# Patient Record
Sex: FEMALE | Race: WHITE | NOT HISPANIC OR LATINO
[De-identification: names, ages, dates, MRNs, and addresses within clinical notes are randomized per-mention and may not be internally consistent; named-entity substitution may affect disease eponyms.]

---

## 2017-01-16 ENCOUNTER — APPOINTMENT (OUTPATIENT)
Dept: SURGICAL ONCOLOGY | Facility: CLINIC | Age: 72
End: 2017-01-16

## 2017-01-16 VITALS
SYSTOLIC BLOOD PRESSURE: 114 MMHG | HEIGHT: 61 IN | HEART RATE: 80 BPM | DIASTOLIC BLOOD PRESSURE: 70 MMHG | BODY MASS INDEX: 20.96 KG/M2 | WEIGHT: 111 LBS

## 2017-01-16 DIAGNOSIS — Z80.3 FAMILY HISTORY OF MALIGNANT NEOPLASM OF BREAST: ICD-10-CM

## 2017-01-16 DIAGNOSIS — Z97.3 PRESENCE OF SPECTACLES AND CONTACT LENSES: ICD-10-CM

## 2017-01-16 RX ORDER — MESALAMINE 1000 MG/1
1000 SUPPOSITORY RECTAL
Qty: 14 | Refills: 0 | Status: DISCONTINUED | COMMUNITY
Start: 2016-09-28

## 2017-01-18 ENCOUNTER — APPOINTMENT (OUTPATIENT)
Dept: ORTHOPEDIC SURGERY | Facility: CLINIC | Age: 72
End: 2017-01-18

## 2017-07-17 ENCOUNTER — APPOINTMENT (OUTPATIENT)
Dept: SURGICAL ONCOLOGY | Facility: CLINIC | Age: 72
End: 2017-07-17

## 2017-07-17 VITALS
HEART RATE: 69 BPM | WEIGHT: 112 LBS | DIASTOLIC BLOOD PRESSURE: 67 MMHG | BODY MASS INDEX: 21.14 KG/M2 | HEIGHT: 61 IN | OXYGEN SATURATION: 100 % | SYSTOLIC BLOOD PRESSURE: 122 MMHG

## 2017-07-17 RX ORDER — FAMOTIDINE 20 MG/1
20 TABLET, FILM COATED ORAL
Qty: 60 | Refills: 0 | Status: ACTIVE | COMMUNITY
Start: 2017-01-30

## 2017-10-06 ENCOUNTER — APPOINTMENT (OUTPATIENT)
Dept: INTERNAL MEDICINE | Facility: CLINIC | Age: 72
End: 2017-10-06
Payer: MEDICARE

## 2017-10-06 ENCOUNTER — MED ADMIN CHARGE (OUTPATIENT)
Age: 72
End: 2017-10-06

## 2017-10-06 PROCEDURE — 90662 IIV NO PRSV INCREASED AG IM: CPT

## 2017-10-06 PROCEDURE — G0008: CPT

## 2017-10-31 ENCOUNTER — APPOINTMENT (OUTPATIENT)
Dept: INTERNAL MEDICINE | Facility: CLINIC | Age: 72
End: 2017-10-31

## 2017-11-06 ENCOUNTER — APPOINTMENT (OUTPATIENT)
Dept: ORTHOPEDIC SURGERY | Facility: CLINIC | Age: 72
End: 2017-11-06
Payer: MEDICARE

## 2017-11-06 DIAGNOSIS — S46.911A STRAIN OF UNSPECIFIED MUSCLE, FASCIA AND TENDON AT SHOULDER AND UPPER ARM LEVEL, RIGHT ARM, INITIAL ENCOUNTER: ICD-10-CM

## 2017-11-06 PROCEDURE — 99213 OFFICE O/P EST LOW 20 MIN: CPT

## 2017-11-06 PROCEDURE — 73030 X-RAY EXAM OF SHOULDER: CPT | Mod: RT

## 2017-11-22 PROBLEM — S46.911A RIGHT SHOULDER STRAIN: Status: ACTIVE | Noted: 2017-11-22

## 2017-12-01 ENCOUNTER — APPOINTMENT (OUTPATIENT)
Dept: INTERNAL MEDICINE | Facility: CLINIC | Age: 72
End: 2017-12-01
Payer: MEDICARE

## 2017-12-01 LAB
ALBUMIN SERPL ELPH-MCNC: 4.1 G/DL
ALP BLD-CCNC: 58 U/L
ALT SERPL-CCNC: 19 U/L
ANION GAP SERPL CALC-SCNC: 12 MMOL/L
APPEARANCE: CLEAR
AST SERPL-CCNC: 29 U/L
BILIRUB SERPL-MCNC: 0.4 MG/DL
BILIRUBIN URINE: NEGATIVE
BLOOD URINE: NEGATIVE
BUN SERPL-MCNC: 22 MG/DL
CALCIUM SERPL-MCNC: 9.5 MG/DL
CHLORIDE SERPL-SCNC: 104 MMOL/L
CHOLEST SERPL-MCNC: 222 MG/DL
CHOLEST/HDLC SERPL: 2.9 RATIO
CO2 SERPL-SCNC: 27 MMOL/L
COLOR: YELLOW
CREAT SERPL-MCNC: 0.83 MG/DL
GLUCOSE QUALITATIVE U: NEGATIVE MG/DL
GLUCOSE SERPL-MCNC: 90 MG/DL
HDLC SERPL-MCNC: 76 MG/DL
KETONES URINE: NEGATIVE
LDLC SERPL CALC-MCNC: 130 MG/DL
LEUKOCYTE ESTERASE URINE: NEGATIVE
NITRITE URINE: NEGATIVE
PH URINE: 6.5
POTASSIUM SERPL-SCNC: 4.6 MMOL/L
PROT SERPL-MCNC: 7.1 G/DL
PROTEIN URINE: NEGATIVE MG/DL
SODIUM SERPL-SCNC: 143 MMOL/L
SPECIFIC GRAVITY URINE: 1.02
TRIGL SERPL-MCNC: 82 MG/DL
UROBILINOGEN URINE: NEGATIVE MG/DL

## 2017-12-01 PROCEDURE — 36415 COLL VENOUS BLD VENIPUNCTURE: CPT

## 2017-12-02 LAB
25(OH)D3 SERPL-MCNC: 34.7 NG/ML
BASOPHILS # BLD AUTO: 0.03 K/UL
BASOPHILS NFR BLD AUTO: 0.8 %
EOSINOPHIL # BLD AUTO: 0.03 K/UL
EOSINOPHIL NFR BLD AUTO: 0.8
HCT VFR BLD CALC: 37.8 %
HGB BLD-MCNC: 12.4 G/DL
IMM GRANULOCYTES NFR BLD AUTO: 0 %
LYMPHOCYTES # BLD AUTO: 1.2 K/UL
LYMPHOCYTES NFR BLD AUTO: 32.4 %
MAN DIFF?: NORMAL
MCHC RBC-ENTMCNC: 29.9 PG
MCHC RBC-ENTMCNC: 32.8 GM/DL
MCV RBC AUTO: 91.1 FL
MONOCYTES # BLD AUTO: 0.3 K/UL
MONOCYTES NFR BLD AUTO: 8.1 %
NEUTROPHILS # BLD AUTO: 2.14 K/UL
NEUTROPHILS NFR BLD AUTO: 57.9 %
PLATELET # BLD AUTO: 265 K/UL
RBC # BLD: 4.15 M/UL
RBC # FLD: 14 %
T4 SERPL-MCNC: 5.6 UG/DL
TSH SERPL-ACNC: 1.38 UIU/ML
WBC # FLD AUTO: 3.7 K/UL

## 2017-12-11 ENCOUNTER — APPOINTMENT (OUTPATIENT)
Dept: INTERNAL MEDICINE | Facility: CLINIC | Age: 72
End: 2017-12-11
Payer: MEDICARE

## 2017-12-11 ENCOUNTER — NON-APPOINTMENT (OUTPATIENT)
Age: 72
End: 2017-12-11

## 2017-12-11 VITALS
SYSTOLIC BLOOD PRESSURE: 120 MMHG | DIASTOLIC BLOOD PRESSURE: 70 MMHG | HEART RATE: 70 BPM | HEIGHT: 61 IN | RESPIRATION RATE: 16 BRPM | BODY MASS INDEX: 21.71 KG/M2 | WEIGHT: 115 LBS

## 2017-12-11 DIAGNOSIS — Z00.00 ENCOUNTER FOR GENERAL ADULT MEDICAL EXAMINATION W/OUT ABNORMAL FINDINGS: ICD-10-CM

## 2017-12-11 DIAGNOSIS — E55.9 VITAMIN D DEFICIENCY, UNSPECIFIED: ICD-10-CM

## 2017-12-11 PROCEDURE — 99213 OFFICE O/P EST LOW 20 MIN: CPT | Mod: 25

## 2017-12-11 PROCEDURE — G0439: CPT

## 2017-12-11 PROCEDURE — 99497 ADVNCD CARE PLAN 30 MIN: CPT | Mod: 33

## 2017-12-11 PROCEDURE — 93000 ELECTROCARDIOGRAM COMPLETE: CPT

## 2017-12-11 RX ORDER — OFLOXACIN 3 MG/ML
0.3 SOLUTION/ DROPS OPHTHALMIC
Qty: 5 | Refills: 0 | Status: DISCONTINUED | COMMUNITY
Start: 2017-01-31 | End: 2017-12-11

## 2017-12-13 ENCOUNTER — RX RENEWAL (OUTPATIENT)
Age: 72
End: 2017-12-13

## 2018-01-01 ENCOUNTER — RX RENEWAL (OUTPATIENT)
Age: 73
End: 2018-01-01

## 2018-01-12 ENCOUNTER — APPOINTMENT (OUTPATIENT)
Dept: INTERNAL MEDICINE | Facility: CLINIC | Age: 73
End: 2018-01-12
Payer: MEDICARE

## 2018-01-12 VITALS — HEART RATE: 78 BPM | RESPIRATION RATE: 16 BRPM | DIASTOLIC BLOOD PRESSURE: 78 MMHG | SYSTOLIC BLOOD PRESSURE: 134 MMHG

## 2018-01-12 PROCEDURE — 99213 OFFICE O/P EST LOW 20 MIN: CPT

## 2018-01-12 RX ORDER — TRIAMCINOLONE ACETONIDE 0.25 MG/G
0.03 CREAM TOPICAL
Qty: 80 | Refills: 0 | Status: DISCONTINUED | COMMUNITY
Start: 2017-11-02

## 2018-01-12 RX ORDER — KETOCONAZOLE 20 MG/G
2 CREAM TOPICAL
Qty: 60 | Refills: 0 | Status: DISCONTINUED | COMMUNITY
Start: 2017-10-19

## 2018-01-16 ENCOUNTER — TRANSCRIPTION ENCOUNTER (OUTPATIENT)
Age: 73
End: 2018-01-16

## 2018-01-22 ENCOUNTER — APPOINTMENT (OUTPATIENT)
Dept: SURGICAL ONCOLOGY | Facility: CLINIC | Age: 73
End: 2018-01-22
Payer: MEDICARE

## 2018-01-22 VITALS
HEART RATE: 86 BPM | BODY MASS INDEX: 21.71 KG/M2 | WEIGHT: 115 LBS | SYSTOLIC BLOOD PRESSURE: 114 MMHG | HEIGHT: 61 IN | DIASTOLIC BLOOD PRESSURE: 65 MMHG | OXYGEN SATURATION: 97 %

## 2018-01-22 PROCEDURE — 99214 OFFICE O/P EST MOD 30 MIN: CPT

## 2018-04-16 DIAGNOSIS — R73.09 OTHER ABNORMAL GLUCOSE: ICD-10-CM

## 2018-04-17 ENCOUNTER — LABORATORY RESULT (OUTPATIENT)
Age: 73
End: 2018-04-17

## 2018-04-17 ENCOUNTER — APPOINTMENT (OUTPATIENT)
Dept: INTERNAL MEDICINE | Facility: CLINIC | Age: 73
End: 2018-04-17
Payer: MEDICARE

## 2018-04-17 PROCEDURE — 36415 COLL VENOUS BLD VENIPUNCTURE: CPT

## 2018-07-23 ENCOUNTER — APPOINTMENT (OUTPATIENT)
Dept: SURGICAL ONCOLOGY | Facility: CLINIC | Age: 73
End: 2018-07-23
Payer: MEDICARE

## 2018-07-23 VITALS
HEART RATE: 89 BPM | HEIGHT: 61 IN | SYSTOLIC BLOOD PRESSURE: 99 MMHG | RESPIRATION RATE: 15 BRPM | BODY MASS INDEX: 21.71 KG/M2 | WEIGHT: 115 LBS | DIASTOLIC BLOOD PRESSURE: 64 MMHG

## 2018-07-23 DIAGNOSIS — Z85.3 PERSONAL HISTORY OF MALIGNANT NEOPLASM OF BREAST: ICD-10-CM

## 2018-07-23 PROCEDURE — 99214 OFFICE O/P EST MOD 30 MIN: CPT

## 2018-07-23 RX ORDER — MUPIROCIN 20 MG/G
2 OINTMENT TOPICAL
Qty: 22 | Refills: 0 | Status: DISCONTINUED | COMMUNITY
Start: 2017-06-21 | End: 2018-07-23

## 2019-02-04 ENCOUNTER — APPOINTMENT (OUTPATIENT)
Dept: SURGICAL ONCOLOGY | Facility: CLINIC | Age: 74
End: 2019-02-04
Payer: MEDICARE

## 2019-02-04 VITALS
SYSTOLIC BLOOD PRESSURE: 135 MMHG | RESPIRATION RATE: 16 BRPM | HEART RATE: 84 BPM | OXYGEN SATURATION: 99 % | DIASTOLIC BLOOD PRESSURE: 74 MMHG | HEIGHT: 61 IN | BODY MASS INDEX: 22.28 KG/M2 | WEIGHT: 118 LBS

## 2019-02-04 PROCEDURE — 99214 OFFICE O/P EST MOD 30 MIN: CPT

## 2019-02-04 RX ORDER — MESALAMINE 375 MG/1
0.38 CAPSULE, EXTENDED RELEASE ORAL
Refills: 0 | Status: ACTIVE | COMMUNITY

## 2019-02-04 NOTE — PHYSICAL EXAM
[Normal] : supple, no neck mass and thyroid not enlarged [Normal Supraclavicular Lymph Nodes] : normal supraclavicular lymph nodes [Normal Axillary Lymph Nodes] : normal axillary lymph nodes [Normal] : oriented to person, place and time, with appropriate affect [de-identified] : S/P right breast lumpectomy and axillary sentinel node biopsy; no masses noted. \par  [FreeTextEntry1] : deferred

## 2019-02-04 NOTE — ASSESSMENT
[FreeTextEntry1] : IMP: \par JEANETTE - hx of infiltrating ductal carcinoma 2003\par On no further adjuvant\par \par Plan:\par RTO q 6 months (as per patient request)\par Blood work and Breast MRI as per Dr. Puga\par Mammogram and sonogram 10/2019\par

## 2019-02-04 NOTE — HISTORY OF PRESENT ILLNESS
[de-identified] : Tonya is a 74 year old female who presents to the office for 6-month Breast Exam follow up visit. \par \par Past Hx: \par s/p RIGHT Breast Lumpectomy on 10/8/2003 for moderately differentiated infiltrating ductal carcinoma, followed by Radiation Therapy.  Completed course of Tamoxifen and Arimidex.\par \par Breast MRI done on 4/9/18 revealing no evidence of malignancy, (BIRADS 1).\par Mammogram/sonogram October 2018:  no evidence of malignancy (BIRADS 1).\par \par BRCA NEGATIVE\par \par Blood work done by Dr. Puga ([facility]).  10/2018 CA27.29 = 13.3\par \par At this time patient denies breast pain, palpable masses, skin changes and/or nipple discharge. \par \par Internist: Dr. Gonzalez

## 2019-05-05 ENCOUNTER — TRANSCRIPTION ENCOUNTER (OUTPATIENT)
Age: 74
End: 2019-05-05

## 2019-08-05 ENCOUNTER — APPOINTMENT (OUTPATIENT)
Dept: SURGICAL ONCOLOGY | Facility: CLINIC | Age: 74
End: 2019-08-05
Payer: MEDICARE

## 2019-08-05 VITALS
BODY MASS INDEX: 22.47 KG/M2 | DIASTOLIC BLOOD PRESSURE: 65 MMHG | RESPIRATION RATE: 15 BRPM | WEIGHT: 119 LBS | SYSTOLIC BLOOD PRESSURE: 134 MMHG | HEART RATE: 78 BPM | HEIGHT: 61 IN

## 2019-08-05 PROCEDURE — 99214 OFFICE O/P EST MOD 30 MIN: CPT

## 2019-08-05 NOTE — PHYSICAL EXAM
[Normal] : supple, no neck mass and thyroid not enlarged [Normal Supraclavicular Lymph Nodes] : normal supraclavicular lymph nodes [Normal Axillary Lymph Nodes] : normal axillary lymph nodes [Normal] : oriented to person, place and time, with appropriate affect [de-identified] : S/P right breast lumpectomy and axillary sentinel node biopsy; no masses noted. \par  [FreeTextEntry1] : deferred

## 2019-08-05 NOTE — ASSESSMENT
[FreeTextEntry1] : IMP: \par JEANETTE - hx of infiltrating ductal carcinoma 2003 s/p right lumpectomy \par On no further adjuvant\par \par Plan:\par RTO q 6 months (as per patient request)\par Blood work and Breast MRI as per Dr. Puga\par Mammogram and sonogram 10/2019 (script in computer)\par

## 2019-08-05 NOTE — HISTORY OF PRESENT ILLNESS
[de-identified] : Tonya is a 74 year old female who presents to the office for 6-month Breast Exam follow up visit. \par \par Past Hx: \par s/p RIGHT Breast Lumpectomy on 10/8/2003 for moderately differentiated infiltrating ductal carcinoma, followed by Radiation Therapy.  Completed course of Tamoxifen and Arimidex.\par \par Breast MRI done on 4/2/19 revealing no evidence of malignancy, (BIRADS 2).\par Mammogram/sonogram October 2018:  no evidence of malignancy (BIRADS 1).\par \par BRCA NEGATIVE\par \par Blood work done by Dr. Puga ([facility]).  5/2019  CA27.29 = 14.5\par \par At this time patient denies breast pain, palpable masses, skin changes and/or nipple discharge. \par \par Internist: Dr. Gonzlaez

## 2020-02-10 ENCOUNTER — APPOINTMENT (OUTPATIENT)
Dept: SURGICAL ONCOLOGY | Facility: CLINIC | Age: 75
End: 2020-02-10
Payer: MEDICARE

## 2020-02-10 ENCOUNTER — TRANSCRIPTION ENCOUNTER (OUTPATIENT)
Age: 75
End: 2020-02-10

## 2020-02-10 VITALS
WEIGHT: 119 LBS | HEIGHT: 61 IN | SYSTOLIC BLOOD PRESSURE: 128 MMHG | BODY MASS INDEX: 22.47 KG/M2 | RESPIRATION RATE: 15 BRPM | OXYGEN SATURATION: 96 % | DIASTOLIC BLOOD PRESSURE: 73 MMHG | HEART RATE: 89 BPM

## 2020-02-10 PROCEDURE — 99214 OFFICE O/P EST MOD 30 MIN: CPT

## 2020-02-10 NOTE — ASSESSMENT
[FreeTextEntry1] : IMP: \par JEANETTE - hx of infiltrating ductal carcinoma 2003 s/p right lumpectomy \par On no further adjuvant\par Mammogram/sonogram 10/2019- BIRADS 2\par \par Plan:\par RTO q 6 months (as per patient request)\par Blood work and Breast MRI as per Dr. Puga\par Mammogram and sonogram 10/2020\par

## 2020-02-10 NOTE — HISTORY OF PRESENT ILLNESS
[de-identified] : Tonya is a 75 year old female who presents to the office for 6-month Breast Exam follow up visit. \par \par Past Hx: \par s/p RIGHT Breast Lumpectomy on 10/8/2003 for moderately differentiated infiltrating ductal carcinoma, followed by Radiation Therapy.  Completed course of Tamoxifen and Arimidex.\par \par -Breast MRI done on 4/2/19 revealing no evidence of malignancy, (BIRADS 2).\par Mammogram 10/2019-  No mammographic evidence of malignancy on the right.  Questionable distortion is seen in the left upper outer quadrant.  Additional imaging with spot compression recommended BIRADS 0\par -DIagnostic left mammogram and B/L Breast ultrasound 10/17/19- No evidence of malignancy. BIRADS 2 \par \par BRCA NEGATIVE\par \par Blood work done by Dr. Puga ([facility]).  11/2019  CA27.29 = 13.6 U/mL\par \par Colonoscopy 8/2019- Diverticulosis of the descending colon.  Internal hemorrhoids. \par \par At this time patient denies breast pain, palpable masses, skin changes and/or nipple discharge. \par \par Internist: Dr. Gonzalez

## 2020-02-10 NOTE — PHYSICAL EXAM
[Normal] : supple, no neck mass and thyroid not enlarged [Normal Supraclavicular Lymph Nodes] : normal supraclavicular lymph nodes [Normal Axillary Lymph Nodes] : normal axillary lymph nodes [Normal] : oriented to person, place and time, with appropriate affect [FreeTextEntry1] : deferred [de-identified] : S/P right breast lumpectomy and axillary sentinel node biopsy; no masses noted. \par

## 2020-05-12 ENCOUNTER — RESULT REVIEW (OUTPATIENT)
Age: 75
End: 2020-05-12

## 2020-09-21 ENCOUNTER — APPOINTMENT (OUTPATIENT)
Dept: SURGICAL ONCOLOGY | Facility: CLINIC | Age: 75
End: 2020-09-21
Payer: MEDICARE

## 2020-09-21 VITALS
OXYGEN SATURATION: 97 % | SYSTOLIC BLOOD PRESSURE: 134 MMHG | BODY MASS INDEX: 23.22 KG/M2 | HEIGHT: 61 IN | RESPIRATION RATE: 15 BRPM | WEIGHT: 123 LBS | DIASTOLIC BLOOD PRESSURE: 77 MMHG | HEART RATE: 92 BPM

## 2020-09-21 PROCEDURE — 99214 OFFICE O/P EST MOD 30 MIN: CPT

## 2020-09-21 NOTE — ASSESSMENT
[FreeTextEntry1] : IMP: \par JEANETTE - hx of infiltrating ductal carcinoma 2003 s/p right lumpectomy \par On no further adjuvant\par Mammogram/sonogram 10/2019- BIRADS 2\par MRI 6/2020 -\par \par Plan:\par RTO q 6 months (as per patient request)\par Blood work as per Dr. Puga\par Mammogram and sonogram 10/2020\par MRI 6/2021\par

## 2020-09-21 NOTE — HISTORY OF PRESENT ILLNESS
[de-identified] : Tonya is a 75 year old female who presents to the office for 6-month Breast Exam follow up visit. \par \par Past Hx: \par s/p RIGHT Breast Lumpectomy on 10/8/2003 for moderately differentiated infiltrating ductal carcinoma, followed by Radiation Therapy.  Completed course of Tamoxifen and Arimidex.\par \par -Breast MRI done on 4/2/19 revealing no evidence of malignancy, (BIRADS 2).\par Mammogram 10/2019-  No mammographic evidence of malignancy on the right.  Questionable distortion is seen in the left upper outer quadrant.  Additional imaging with spot compression recommended BIRADS 0\par -DIagnostic left mammogram and B/L Breast ultrasound 10/17/19- No evidence of malignancy. BIRADS 2 \par bloodwork July, 2020 -\par \par BRCA NEGATIVE\par \par Blood work done by Dr. Puga (Med[facility]).  11/2019  CA27.29 = 13.6 U/mL\par \par Colonoscopy 8/2019- Diverticulosis of the descending colon.  Internal hemorrhoids. \par \par At this time patient denies breast pain, palpable masses, skin changes and/or nipple discharge. \par \par Internist: Dr. Gonzalez

## 2020-09-21 NOTE — PHYSICAL EXAM
[Normal] : supple, no neck mass and thyroid not enlarged [Normal Supraclavicular Lymph Nodes] : normal supraclavicular lymph nodes [Normal Axillary Lymph Nodes] : normal axillary lymph nodes [Normal] : oriented to person, place and time, with appropriate affect [de-identified] : No masses or discharge

## 2020-10-28 ENCOUNTER — APPOINTMENT (OUTPATIENT)
Dept: OTOLARYNGOLOGY | Facility: CLINIC | Age: 75
End: 2020-10-28
Payer: MEDICARE

## 2020-10-28 VITALS
HEIGHT: 60 IN | WEIGHT: 123 LBS | HEART RATE: 82 BPM | BODY MASS INDEX: 24.15 KG/M2 | TEMPERATURE: 97.3 F | SYSTOLIC BLOOD PRESSURE: 137 MMHG | DIASTOLIC BLOOD PRESSURE: 77 MMHG

## 2020-10-28 PROCEDURE — 31231 NASAL ENDOSCOPY DX: CPT

## 2020-10-28 PROCEDURE — 92567 TYMPANOMETRY: CPT

## 2020-10-28 PROCEDURE — 92557 COMPREHENSIVE HEARING TEST: CPT

## 2020-10-28 PROCEDURE — 99204 OFFICE O/P NEW MOD 45 MIN: CPT | Mod: 25

## 2020-10-28 NOTE — PHYSICAL EXAM
[Midline] : trachea located in midline position [Normal] : gait was normal [de-identified] : B/L tenderness  [Hearing Loss Right Only] : normal [Hearing Loss Left Only] : normal [Nystagmus] : ~T no ~M nystagmus was seen [Fukuda Step Test] : Fukuda Step Test was Negative

## 2020-10-28 NOTE — ASSESSMENT
[FreeTextEntry1] : Vertigo\par Peripheral vestibulopathy (BPPV) vs Cervical vertigo\par Rec eval and tx by Vestib Rehab PT\par \par bilateral sensorineural hearing loss-cleared for hearing aids\par \par DNS and Rhinitis\par -Rx: Steam humidification \par -Hypertonic saline nasal irrigations \par \par f/u 3 months

## 2020-10-28 NOTE — DATA REVIEWED
[de-identified] : bilat SNHL\par sl L>R\par Hearing Test performed to evaluate the extent of hearing loss and  to explain pt's symptoms\par

## 2020-10-28 NOTE — HISTORY OF PRESENT ILLNESS
[Vertigo] : vertigo [Dizziness] : dizziness [Nasal Congestion] : nasal congestion [No] : patient does not have a  history of radiation therapy [de-identified] : 74 yo female\par Patient complains of vertigo x 2 months. States she bend down to pick something up and got really dizzy had Meclizine at home which helped. During the day she feels well, at night when she is turning to her sided she feels dizzy lasting several seconds. Also states has hx of cervical herniated disc. Has long time hx of nasal congestion uses Afrin and Flonase PRN which helps. \par Pt has no ear pain, ear drainage, hearing loss, tinnitus, nasal discharge, epistaxis, sinus infections, facial pain, facial pressure, throat pain, dysphagia or fevers\par \par  [Anxiety] : no anxiety [Headache] : no headache [Hearing Loss] : no hearing loss [Recurrent Otitis Media] : no recurrent otitis media [Otitis Media with Effusion] : no otitis media with effusion [Presbycusis] : no presbycusis [Congenital Ear Malformation] : no congenital ear malformation [Meniere Disease] : no Meniere disease [Otosclerosis] : no otosclerosis [Perilymphatic Fistula] : no perilymphatic fistula [Hypertension] : no hypertension [Loud Noise Exposure] : no history of loud noise exposure [Smoking] : no smoking [Early Onset Hearing Loss] : no early onset hearing loss [Stroke] : no stroke [Facial Pain] : no facial pain [Facial Pressure] : no facial pressure [Diplopia] : no diplopia [Ear Fullness] : no ear fullness [Allergic Rhinitis] : no allergic rhinitis [Maxillary Tooth Infection] : no maxillary tooth infection [Septal Deviation] : no septal deviation [Environmental Allergies] : no environmental allergies [Seasonal Allergies] : no seasonal allergies [Environmental Allergens] : no environmental allergens [Adenoidectomy] : no adenoidectomy [Nasal FB Removal] : no nasal foreign body removal [Allergies] : no allergies [Asthma] : no asthma [Neck Mass] : no neck mass [Neck Pain] : no neck pain [Chills] : no chills [Cold Intolerance] : no cold intolerance [Cough] : no cough [Fatigue] : no fatigue [Heat Intolerance] : no heat intolerance [Hyperthyroidism] : no hyperthyroidism [Sialadenitis] : no sialadenitis [Hodgkin Disease] : no hodgkin disease [Non-Hodgkin Lymphoma] : no non-hodgkin lymphoma [Tobacco Use] : no tobacco use [Alcohol Use] : no alcohol use [Graves Disease] : no graves disease [Thyroid Cancer] : no thyroid cancer

## 2020-12-28 ENCOUNTER — APPOINTMENT (OUTPATIENT)
Dept: OTOLARYNGOLOGY | Facility: CLINIC | Age: 75
End: 2020-12-28
Payer: MEDICARE

## 2020-12-28 VITALS
HEART RATE: 97 BPM | BODY MASS INDEX: 23.22 KG/M2 | WEIGHT: 123 LBS | SYSTOLIC BLOOD PRESSURE: 130 MMHG | TEMPERATURE: 97.8 F | HEIGHT: 61 IN | DIASTOLIC BLOOD PRESSURE: 72 MMHG

## 2020-12-28 PROCEDURE — 99214 OFFICE O/P EST MOD 30 MIN: CPT

## 2020-12-28 RX ORDER — TIMOLOL MALEATE 5 MG/ML
0.5 SOLUTION OPHTHALMIC
Qty: 5 | Refills: 0 | Status: ACTIVE | COMMUNITY
Start: 2020-12-05

## 2020-12-28 NOTE — DATA REVIEWED
[de-identified] : bilat SNHL\par sl L>R\par Hearing Test performed to evaluate the extent of hearing loss and  to explain pt's symptoms\par

## 2020-12-28 NOTE — ASSESSMENT
[FreeTextEntry1] : Vertigo\par -Peripheral vestibulopathy (BPPV) vs Cervical vertigo\par -continue vestibular rehab\par -Advised for acupuncture, recommendations given  \par \par DNS and Rhinitis\par -Rx: Steam humidification and hypertonic saline nasal irrigations \par \par f/u 2-3 months

## 2020-12-28 NOTE — PHYSICAL EXAM
[Midline] : trachea located in midline position [Normal] : gait was normal [] : septum deviated to the left [de-identified] : B/L tenderness  [Hearing Loss Right Only] : normal [Hearing Loss Left Only] : normal [Nystagmus] : ~T no ~M nystagmus was seen [Fukuda Step Test] : Fukuda Step Test was Negative

## 2020-12-28 NOTE — HISTORY OF PRESENT ILLNESS
[No] : patient does not have a  history of radiation therapy [Vertigo] : vertigo [Dizziness] : dizziness [Nasal Congestion] : nasal congestion [de-identified] : 74 yo female\par Patient complains of vertigo x 2 months. States she bend down to pick something up and got really dizzy had Meclizine at home which helped. During the day she feels well, at night when she is turning to her sided she feels dizzy lasting several seconds. Also states has hx of cervical herniated disc. Has long time hx of nasal congestion uses Afrin and Flonase PRN which helps. \par Pt has no ear pain, ear drainage, hearing loss, tinnitus, nasal discharge, epistaxis, sinus infections, facial pain, facial pressure, throat pain, dysphagia or fevers\par \par  [FreeTextEntry1] : 12/28/2020\par Patient presents for follow up. States she is feeling slightly better. Going for vestibular rehab with moderate relief. Using hypertonic saline for nasal congestion with mild relief.\par no other modifying factors\par \par  [Anxiety] : no anxiety [Headache] : no headache [Hearing Loss] : no hearing loss [Recurrent Otitis Media] : no recurrent otitis media [Otitis Media with Effusion] : no otitis media with effusion [Presbycusis] : no presbycusis [Congenital Ear Malformation] : no congenital ear malformation [Meniere Disease] : no Meniere disease [Otosclerosis] : no otosclerosis [Perilymphatic Fistula] : no perilymphatic fistula [Hypertension] : no hypertension [Loud Noise Exposure] : no history of loud noise exposure [Smoking] : no smoking [Early Onset Hearing Loss] : no early onset hearing loss [Stroke] : no stroke [Facial Pain] : no facial pain [Facial Pressure] : no facial pressure [Diplopia] : no diplopia [Ear Fullness] : no ear fullness [Allergic Rhinitis] : no allergic rhinitis [Maxillary Tooth Infection] : no maxillary tooth infection [Septal Deviation] : no septal deviation [Environmental Allergies] : no environmental allergies [Seasonal Allergies] : no seasonal allergies [Environmental Allergens] : no environmental allergens [Adenoidectomy] : no adenoidectomy [Nasal FB Removal] : no nasal foreign body removal [Allergies] : no allergies [Asthma] : no asthma [Neck Mass] : no neck mass [Neck Pain] : no neck pain [Chills] : no chills [Cold Intolerance] : no cold intolerance [Cough] : no cough [Fatigue] : no fatigue [Heat Intolerance] : no heat intolerance [Hyperthyroidism] : no hyperthyroidism [Sialadenitis] : no sialadenitis [Hodgkin Disease] : no hodgkin disease [Non-Hodgkin Lymphoma] : no non-hodgkin lymphoma [Tobacco Use] : no tobacco use [Alcohol Use] : no alcohol use [Graves Disease] : no graves disease [Thyroid Cancer] : no thyroid cancer

## 2021-01-01 NOTE — REVIEW OF SYSTEMS
[Patient Intake Form Reviewed] : Patient intake form was reviewed [As Noted in HPI] : as noted in HPI [Negative] : Heme/Lymph 38w2d

## 2021-02-10 ENCOUNTER — NON-APPOINTMENT (OUTPATIENT)
Age: 76
End: 2021-02-10

## 2021-03-01 ENCOUNTER — APPOINTMENT (OUTPATIENT)
Dept: OTOLARYNGOLOGY | Facility: CLINIC | Age: 76
End: 2021-03-01
Payer: MEDICARE

## 2021-03-01 VITALS
TEMPERATURE: 97.8 F | SYSTOLIC BLOOD PRESSURE: 129 MMHG | DIASTOLIC BLOOD PRESSURE: 71 MMHG | HEART RATE: 81 BPM | BODY MASS INDEX: 23.22 KG/M2 | HEIGHT: 61 IN | WEIGHT: 123 LBS

## 2021-03-01 DIAGNOSIS — H81.10 BENIGN PAROXYSMAL VERTIGO, UNSPECIFIED EAR: ICD-10-CM

## 2021-03-01 PROCEDURE — 99214 OFFICE O/P EST MOD 30 MIN: CPT | Mod: 25

## 2021-03-01 PROCEDURE — 31231 NASAL ENDOSCOPY DX: CPT

## 2021-03-01 NOTE — PHYSICAL EXAM
[Nasal Endoscopy Performed] : nasal endoscopy was performed, see procedure section for findings [] : septum deviated to the left [Midline] : trachea located in midline position [Normal] : gait was normal [de-identified] : B/L tenderness  [Hearing Loss Right Only] : normal [Hearing Loss Left Only] : normal [Nystagmus] : ~T no ~M nystagmus was seen [Fukuda Step Test] : Fukuda Step Test was Negative

## 2021-03-01 NOTE — DATA REVIEWED
[de-identified] : bilat SNHL\par sl L>R\par Hearing Test performed to evaluate the extent of hearing loss and  to explain pt's symptoms\par

## 2021-03-01 NOTE — HISTORY OF PRESENT ILLNESS
[No] : patient does not have a  history of radiation therapy [Vertigo] : vertigo [Dizziness] : dizziness [Nasal Congestion] : nasal congestion [de-identified] : 75 yo female with hx of vertigo presents for follow up. States she is going to vestibular rehab and has lots of improvement. Has 9 more sessions left of vestibular rehab. States last week she was feeling slightly dizzy when getting out of bed also noticed that she has been congested over the past week using hypertonic saline. Has hx of cervical herniated disc. Pt has no ear pain, ear drainage, hearing loss, tinnitus, nasal discharge, epistaxis, sinus infections, facial pain, facial pressure, throat pain, dysphagia or fevers\par \par  [FreeTextEntry1] : \par \par  [Anxiety] : no anxiety [Headache] : no headache [Hearing Loss] : no hearing loss [Recurrent Otitis Media] : no recurrent otitis media [Otitis Media with Effusion] : no otitis media with effusion [Presbycusis] : no presbycusis [Congenital Ear Malformation] : no congenital ear malformation [Meniere Disease] : no Meniere disease [Otosclerosis] : no otosclerosis [Perilymphatic Fistula] : no perilymphatic fistula [Hypertension] : no hypertension [Loud Noise Exposure] : no history of loud noise exposure [Smoking] : no smoking [Early Onset Hearing Loss] : no early onset hearing loss [Stroke] : no stroke [Facial Pain] : no facial pain [Facial Pressure] : no facial pressure [Diplopia] : no diplopia [Ear Fullness] : no ear fullness [Allergic Rhinitis] : no allergic rhinitis [Maxillary Tooth Infection] : no maxillary tooth infection [Septal Deviation] : no septal deviation [Environmental Allergies] : no environmental allergies [Seasonal Allergies] : no seasonal allergies [Environmental Allergens] : no environmental allergens [Adenoidectomy] : no adenoidectomy [Nasal FB Removal] : no nasal foreign body removal [Allergies] : no allergies [Asthma] : no asthma [Neck Mass] : no neck mass [Neck Pain] : no neck pain [Chills] : no chills [Cold Intolerance] : no cold intolerance [Cough] : no cough [Fatigue] : no fatigue [Heat Intolerance] : no heat intolerance [Hyperthyroidism] : no hyperthyroidism [Sialadenitis] : no sialadenitis [Hodgkin Disease] : no hodgkin disease [Non-Hodgkin Lymphoma] : no non-hodgkin lymphoma [Tobacco Use] : no tobacco use [Alcohol Use] : no alcohol use [Graves Disease] : no graves disease [Thyroid Cancer] : no thyroid cancer

## 2021-03-01 NOTE — ASSESSMENT
[FreeTextEntry1] : Patient with hx of vertigo currently going to vestibular rehab complains of nasal congestion \par Vertigo\par -Peripheral vestibulopathy (BPPV) vs Cervical vertigo\par -continue vestibular rehab, has major improvement with therapy \par \par \par DNS and Rhinitis\par -Rx: Steam humidification\par -Rx: Flonase \par \par f/u 2 months or prn

## 2021-03-22 ENCOUNTER — APPOINTMENT (OUTPATIENT)
Dept: SURGICAL ONCOLOGY | Facility: CLINIC | Age: 76
End: 2021-03-22
Payer: MEDICARE

## 2021-03-22 VITALS
BODY MASS INDEX: 23.22 KG/M2 | HEIGHT: 61 IN | SYSTOLIC BLOOD PRESSURE: 133 MMHG | OXYGEN SATURATION: 97 % | HEART RATE: 76 BPM | DIASTOLIC BLOOD PRESSURE: 77 MMHG | WEIGHT: 123 LBS

## 2021-03-22 PROCEDURE — 99214 OFFICE O/P EST MOD 30 MIN: CPT

## 2021-03-22 NOTE — ASSESSMENT
[FreeTextEntry1] : IMP: \par JEANETTE - hx of infiltrating ductal carcinoma 2003 s/p right lumpectomy \par On no further adjuvant\par Mammogram/sonogram 10/2020- BIRADS 1\par MRI 6/2020 -BIRADS 1\par \par Plan:\par RTO q 6 months (as per patient request)\par Blood work as per Dr. Benavides \par Mammogram and sonogram 10/2021 \par MRI Breast due now (ordered)\par

## 2021-03-22 NOTE — HISTORY OF PRESENT ILLNESS
[de-identified] : Tonya is a 76 year old female who presents to the office for 6-month Breast Exam follow up visit. \par \par Past Hx: \par s/p RIGHT Breast Lumpectomy on 10/8/2003 for moderately differentiated infiltrating ductal carcinoma, followed by Radiation Therapy.  Completed course of Tamoxifen and Arimidex.\par \par BRCA NEGATIVE\par \par Mammogram/sonogram 10/2020- BIRADS 1 \par Bilateral Breast MRI 6/2020- BIRADS 1\par Blood work done by Dr. Benavides (Med-Onc).  CA27.29 done in 11/2020 (13.8 U/mL)\par Colonoscopy 8/2019- Diverticulosis of the descending colon.  Internal hemorrhoids. \par \par At this time patient denies breast pain, palpable masses, skin changes and/or nipple discharge. \par \par Internist: Dr. Gonzalez

## 2021-03-22 NOTE — PHYSICAL EXAM
[Normal] : supple, no neck mass and thyroid not enlarged [Normal Supraclavicular Lymph Nodes] : normal supraclavicular lymph nodes [Normal Axillary Lymph Nodes] : normal axillary lymph nodes [Normal] : oriented to person, place and time, with appropriate affect [de-identified] : healed right breast incision; no masses or nipple discharge \par  [FreeTextEntry1] : deferred

## 2021-03-24 LAB — CANCER AG27-29 SERPL-ACNC: 12.1 U/ML

## 2021-04-23 ENCOUNTER — APPOINTMENT (OUTPATIENT)
Dept: PULMONOLOGY | Facility: CLINIC | Age: 76
End: 2021-04-23
Payer: MEDICARE

## 2021-04-23 VITALS
WEIGHT: 124 LBS | DIASTOLIC BLOOD PRESSURE: 91 MMHG | TEMPERATURE: 98 F | OXYGEN SATURATION: 99 % | HEIGHT: 61.5 IN | HEART RATE: 91 BPM | SYSTOLIC BLOOD PRESSURE: 130 MMHG | BODY MASS INDEX: 23.11 KG/M2

## 2021-04-23 PROCEDURE — 99203 OFFICE O/P NEW LOW 30 MIN: CPT

## 2021-04-23 NOTE — HISTORY OF PRESENT ILLNESS
[TextBox_4] : 76-year-old female with an abnormal image of her right lung. The patient is essentially asymptomatic and was undergoing a routine evaluation of her breast. In 2003 she developed a breast cancer which required lumpectomy and radiation and she has been subsequently followed with breast imaging. A recent MRI did a lung lesion which was confirmed on a PET/CT. She has a 0.9 cm nodule in her right middle lobe that is mildly hypermetabolic. She has several cysts of her liver which have been known for a number of years and confirmed with a recent MRI also.\par . The patient is otherwise in good health. Last year she was hospitalized at [facility] for chest pain and underwent a negative coronary angiogram. The patient has had no previous CT imaging of her lung\par Her oncologist is Jordin Lebron MD and her internist is Tawny Cedillo MD

## 2021-04-23 NOTE — PHYSICAL EXAM
[No Acute Distress] : no acute distress [Normal Appearance] : normal appearance [No Neck Mass] : no neck mass [Normal Rate/Rhythm] : normal rate/rhythm [Normal S1, S2] : normal s1, s2 [No Resp Distress] : no resp distress [Clear to Auscultation Bilaterally] : clear to auscultation bilaterally [Normal Gait] : normal gait [No Clubbing] : no clubbing [No Edema] : no edema [No Focal Deficits] : no focal deficits

## 2021-04-23 NOTE — ASSESSMENT
[FreeTextEntry1] : 76-year-old female with a suspicious pulmonary nodule in an area that was previously subjected to radiation. She has a minimal smoking history and no prior history of a lung disease. I discussed approaches to this lesion but because of its size and location, I thought biopsy was not an option at this point. I have referred the patient to Dr. Vernon Wilkins M.D. for possible surgery.

## 2021-04-28 ENCOUNTER — APPOINTMENT (OUTPATIENT)
Dept: THORACIC SURGERY | Facility: CLINIC | Age: 76
End: 2021-04-28
Payer: MEDICARE

## 2021-04-28 ENCOUNTER — NON-APPOINTMENT (OUTPATIENT)
Age: 76
End: 2021-04-28

## 2021-04-28 VITALS
WEIGHT: 124 LBS | DIASTOLIC BLOOD PRESSURE: 81 MMHG | BODY MASS INDEX: 23.41 KG/M2 | TEMPERATURE: 98.4 F | OXYGEN SATURATION: 99 % | HEART RATE: 93 BPM | RESPIRATION RATE: 18 BRPM | SYSTOLIC BLOOD PRESSURE: 134 MMHG | HEIGHT: 61 IN

## 2021-04-28 DIAGNOSIS — R91.1 SOLITARY PULMONARY NODULE: ICD-10-CM

## 2021-04-28 PROCEDURE — 99205 OFFICE O/P NEW HI 60 MIN: CPT

## 2021-04-28 RX ORDER — KETOCONAZOLE 20.5 MG/ML
2 SHAMPOO, SUSPENSION TOPICAL
Qty: 120 | Refills: 0 | Status: DISCONTINUED | COMMUNITY
Start: 2020-12-08 | End: 2021-04-28

## 2021-04-28 RX ORDER — CLOBETASOL PROPIONATE 0.5 MG/G
0.05 OINTMENT TOPICAL
Qty: 30 | Refills: 0 | Status: DISCONTINUED | COMMUNITY
Start: 2020-07-01 | End: 2021-04-28

## 2021-04-28 RX ORDER — SULFAMETHOXAZOLE AND TRIMETHOPRIM 800; 160 MG/1; MG/1
800-160 TABLET ORAL
Qty: 10 | Refills: 0 | Status: DISCONTINUED | COMMUNITY
Start: 2020-12-09 | End: 2021-04-28

## 2021-04-28 RX ORDER — HYDROCORTISONE 25 MG/G
2.5 CREAM TOPICAL
Qty: 30 | Refills: 0 | Status: DISCONTINUED | COMMUNITY
Start: 2020-09-11 | End: 2021-04-28

## 2021-04-28 RX ORDER — OXYBUTYNIN CHLORIDE 5 MG/1
5 TABLET ORAL
Refills: 0 | Status: DISCONTINUED | COMMUNITY
End: 2021-04-28

## 2021-04-28 RX ORDER — CYCLOBENZAPRINE HYDROCHLORIDE 5 MG/1
5 TABLET, FILM COATED ORAL
Qty: 30 | Refills: 0 | Status: DISCONTINUED | COMMUNITY
Start: 2020-11-10 | End: 2021-04-28

## 2021-04-28 RX ORDER — MECLIZINE HYDROCHLORIDE 12.5 MG/1
12.5 TABLET ORAL EVERY 8 HOURS
Qty: 21 | Refills: 0 | Status: DISCONTINUED | COMMUNITY
Start: 2017-08-23 | End: 2021-04-28

## 2021-04-28 RX ORDER — MOMETASONE FUROATE 1 MG/G
0.1 CREAM TOPICAL
Qty: 45 | Refills: 0 | Status: DISCONTINUED | COMMUNITY
Start: 2020-12-08 | End: 2021-04-28

## 2021-04-28 RX ORDER — CLINDAMYCIN PHOSPHATE 1 G/10ML
1 GEL TOPICAL
Qty: 60 | Refills: 0 | Status: DISCONTINUED | COMMUNITY
Start: 2020-12-08 | End: 2021-04-28

## 2021-04-29 ENCOUNTER — NON-APPOINTMENT (OUTPATIENT)
Age: 76
End: 2021-04-29

## 2021-04-29 PROBLEM — R91.1 PULMONARY NODULE, RIGHT: Status: RESOLVED | Noted: 2021-04-23 | Resolved: 2021-04-29

## 2021-04-29 RX ORDER — DENOSUMAB 60 MG/ML
60 INJECTION SUBCUTANEOUS
Refills: 0 | Status: ACTIVE | COMMUNITY

## 2021-04-29 RX ORDER — BISMUTH SUBSALICYLATE 525 MG/1
TABLET ORAL
Refills: 0 | Status: ACTIVE | COMMUNITY

## 2021-04-29 RX ORDER — CHOLECALCIFEROL (VITAMIN D3) 250 MCG
250 MCG CAPSULE ORAL
Refills: 0 | Status: COMPLETED | COMMUNITY
End: 2021-04-29

## 2021-04-29 NOTE — ASSESSMENT
[FreeTextEntry1] : Ms. MARIFER BENTLEY, 76 year old female, former smoker (2 PPD x 3 years; Quit 1970), w/ hx of breast cancer s/p lumpectomy and radiation 2003, who was undergoing routine evaluation of her breast that revealed 0.9 cm RML LUNG LESION on MRI Breast 4/13/2021, with mildly avidity confirmed on PET/CT 4/16/21. The patient is otherwise in good health. Last year she was hospitalized at [facility] for chest pain and underwent a negative coronary angiogram.  \par \par I have independently reviewed the medical records and imaging at the time of this office consultation. Report of 2011 CT chest noting right middle lobe decreasing tubular  lung nodules. Discussed with patient and  that reviewing 2011 imaging will be helpful in deciphering nodule stability. Office has requested imaging from [facility]. Patient recommended to return to clinic in 1 week to discuss further plan of care. \par \par Recommendations reviewed with patient during this office visit, and all questions answered; Patient instructed on the importance of follow up and verbalizes understanding.\par \par I personally performed the services described in the documentation, reviewed the documentation recorded by the scribe in my presence and it accurately and completely records my words and actions.\par \par I, PEE Aparicio-C, am scribing for and the presence of Dr. Vernon Wilkins, the following sections HISTORY OF PRESENT ILLNESS, PAST MEDICAL/FAMILY/SOCIAL HISTORY; REVIEW OF SYSTEMS; VITAL SIGNS; PHYSICAL EXAM; DISPOSITION.\par \par

## 2021-04-29 NOTE — HISTORY OF PRESENT ILLNESS
[FreeTextEntry1] : Ms. MARIFER BENTLEY, 76 year old female, former smoker (2 PPD x 3 years; Quit 1970), w/ hx of breast cancer s/p lumpectomy and radiation 2003, who was undergoing routine evaluation of her breast that revealed 0.9 cm RML LUNG LESION on MRI Breast 4/13/2021, with mildly avidity confirmed on PET/CT 4/16/21. The patient is otherwise in good health. Last year she was hospitalized at [facility] for chest pain and underwent a negative coronary angiogram.  \par \par CT chest from 6/30/2011\par - Previously noted tubular opacity in RML on 10/31/2006 has decreased in length\par - Adjacent small tubular opacities are noted in the adjacent lung field, unchanged\par - Calcified granuloma again noted in RLL\par - Subcentimeter hypodensity in the RLL appears unchanged\par \par MRI Breast 4/13/2021\par - 0.9 cm enhancing mass in the RML (series 6, image 34)\par - 1.5 cm enhancing mass in the right lobe of liver\par \par PET/CT on 4/16/2021:\par - 8 x 8 mm round nodule in RML SUV 0.9 (4:43)\par \par Of note, she has several cysts of her liver which have been known for a number of years and confirmed with a recent MRI also.\par \par Patient is here today for CT Sx consultation, referred by Dr. Jean Elizabeth (PULM). Patient denies worsening SOB, chest pain, cough, hemoptysis, fever, chills, night sweats, lightheadedness or dizziness.\par

## 2021-04-29 NOTE — CONSULT LETTER
[Dear  ___] : Dear  [unfilled], [Please see my note below.] : Please see my note below. [Consult Closing:] : Thank you very much for allowing me to participate in the care of this patient.  If you have any questions, please do not hesitate to contact me. [Sincerely,] : Sincerely, [Consult Letter:] : I had the pleasure of evaluating your patient, [unfilled]. [FreeTextEntry2] : Dr. Jean Elizabeth (PULM)\par Dr. Jordin Lebron MD (ONC)\par Dr. Tawny Cedillo (PCP) [FreeTextEntry3] : Vernon Wilkins MD, FACS \par , Division of Thoracic Surgery \par [facility] \par Chief, Thoracic Surgery \par [facility] \par Department of Cardiovascular & Thoracic Surgery \par  \par [facility]\par

## 2021-04-29 NOTE — PHYSICAL EXAM
[General Appearance - Alert] : alert [General Appearance - In No Acute Distress] : in no acute distress [General Appearance - Well Nourished] : well nourished [General Appearance - Well Developed] : well developed [General Appearance - Well-Appearing] : healthy appearing [Sclera] : the sclera and conjunctiva were normal [PERRL With Normal Accommodation] : pupils were equal in size, round, and reactive to light [Extraocular Movements] : extraocular movements were intact [Outer Ear] : the ears and nose were normal in appearance [Hearing Threshold Finger Rub Not [place]] : hearing was normal [Examination Of The Oral Cavity] : the lips and gums were normal [Neck Appearance] : the appearance of the neck was normal [Neck Cervical Mass (___cm)] : no neck mass was observed [Respiration, Rhythm And Depth] : normal respiratory rhythm and effort [Exaggerated Use Of Accessory Muscles For Inspiration] : no accessory muscle use [Auscultation Breath Sounds / Voice Sounds] : lungs were clear to auscultation bilaterally [Heart Rate And Rhythm] : heart rate was normal and rhythm regular [Examination Of The Chest] : the chest was normal in appearance [Chest Visual Inspection Thoracic Asymmetry] : no chest asymmetry [Diminished Respiratory Excursion] : normal chest expansion [2+] : left 2+ [Breast Appearance] : normal in appearance [Breast Palpation Mass] : no palpable masses [Bowel Sounds] : normal bowel sounds [Abdomen Soft] : soft [Abdomen Tenderness] : non-tender [Cervical Lymph Nodes Enlarged Posterior Bilaterally] : posterior cervical [Cervical Lymph Nodes Enlarged Anterior Bilaterally] : anterior cervical [Supraclavicular Lymph Nodes Enlarged Bilaterally] : supraclavicular [No CVA Tenderness] : no ~M costovertebral angle tenderness [No Spinal Tenderness] : no spinal tenderness [Abnormal Walk] : normal gait [Nail Clubbing] : no clubbing  or cyanosis of the fingernails [Musculoskeletal - Swelling] : no joint swelling seen [Skin Color & Pigmentation] : normal skin color and pigmentation [Skin Turgor] : normal skin turgor [] : no rash [Skin Lesions] : no skin lesions [Deep Tendon Reflexes (DTR)] : deep tendon reflexes were 2+ and symmetric [Sensation] : the sensory exam was normal to light touch and pinprick [Motor Exam] : the motor exam was normal [No Focal Deficits] : no focal deficits [Oriented To Time, Place, And Person] : oriented to person, place, and time [Impaired Insight] : insight and judgment were intact [Affect] : the affect was normal [Mood] : the mood was normal [Memory Recent] : recent memory was not impaired [Memory Remote] : remote memory was not impaired [FreeTextEntry1] : deferred

## 2021-05-03 ENCOUNTER — APPOINTMENT (OUTPATIENT)
Dept: OTOLARYNGOLOGY | Facility: CLINIC | Age: 76
End: 2021-05-03
Payer: MEDICARE

## 2021-05-03 VITALS
SYSTOLIC BLOOD PRESSURE: 142 MMHG | HEIGHT: 61 IN | HEART RATE: 98 BPM | TEMPERATURE: 97.6 F | DIASTOLIC BLOOD PRESSURE: 71 MMHG | BODY MASS INDEX: 23.41 KG/M2 | WEIGHT: 124 LBS

## 2021-05-03 DIAGNOSIS — J32.9 CHRONIC SINUSITIS, UNSPECIFIED: ICD-10-CM

## 2021-05-03 PROBLEM — R91.1 PULMONARY NODULE, RIGHT: Status: ACTIVE | Noted: 2021-04-29

## 2021-05-03 PROCEDURE — 99213 OFFICE O/P EST LOW 20 MIN: CPT | Mod: 25

## 2021-05-03 PROCEDURE — 31231 NASAL ENDOSCOPY DX: CPT

## 2021-05-03 NOTE — PROCEDURE
[FreeTextEntry6] : Anterior Rhinoscopy insufficient to account for symptoms.\par \par After informed verbal consent is obtained, the fiberoptic nasal endoscope #31 is passed via the right nasal cavity.\par The following anatomic sites were directly examined in a sequential fashion:\par The scope was introduced in both  nasal passages between the middle and inferior turbinates to exam the inferior portion of the middle meatus and the fontanelle, as well as the maxillary ostia.  Next, the scope was passed medially and posteriorly to the middle turbinates to examine the sphenoethmoid recess and the superior turbinate region.\par  \par \par   There is [ 0 ]% obstruction of the nasopharynx with adenoid tissue.\par \par A deviated nasal septum was noted causing obstruction.\par The turbinates were congested-bilateral.\par \par Right Side:\par ·               Mucosa: wnl\par ·               Mucous: none\par ·               Polyp: none\par ·               Inferior Turbinate: sl congested\par ·               Middle Turbinate:sl congested\par ·               Superior Turbinate: wnl\par ·               Inferior Meatus:clear\par ·               Middle Meatus: clear\par ·               Super Meatus: clear\par ·               Sphenoethmoidal Recess: wnl\par \par \par \par Left Side:\par ·               Mucosa: wnl\par ·               Mucous:none\par ·               Polyp: none\par ·               Inferior Turbinate: sl congested\par ·               Middle Turbinate: sl congested\par ·               Superior Turbinate:wnl\par ·               Inferior Meatus: clear\par ·               Middle Meatus: clear\par ·               Super Meatus:clear\par ·               Sphenoethmoidal Recess: wnl\par \par

## 2021-05-03 NOTE — HISTORY OF PRESENT ILLNESS
[No] : patient does not have a  history of radiation therapy [Vertigo] : vertigo [Dizziness] : dizziness [Nasal Congestion] : nasal congestion [de-identified] : 75 yo female with hx of vertigo presents for follow up. States she is going to vestibular rehab and has lots of improvement. Has 9 more sessions left of vestibular rehab. States last week she was feeling slightly dizzy when getting out of bed also noticed that she has been congested over the past week using hypertonic saline. Has hx of cervical herniated disc. Pt has no ear pain, ear drainage, hearing loss, tinnitus, nasal discharge, epistaxis, sinus infections, facial pain, facial pressure, throat pain, dysphagia or fevers\par \par  [FreeTextEntry1] : Patient presents for follow up. States dizziness is better, over the last few weeks she has been doing well. Finished vestibular rehab.  Has mild nasal congestion, using Flonase and AYR sprays with mild relief. \par No other modifying factors\par \par \par  [Anxiety] : no anxiety [Headache] : no headache [Hearing Loss] : no hearing loss [Recurrent Otitis Media] : no recurrent otitis media [Otitis Media with Effusion] : no otitis media with effusion [Presbycusis] : no presbycusis [Congenital Ear Malformation] : no congenital ear malformation [Meniere Disease] : no Meniere disease [Otosclerosis] : no otosclerosis [Perilymphatic Fistula] : no perilymphatic fistula [Hypertension] : no hypertension [Loud Noise Exposure] : no history of loud noise exposure [Smoking] : no smoking [Early Onset Hearing Loss] : no early onset hearing loss [Stroke] : no stroke [Facial Pain] : no facial pain [Facial Pressure] : no facial pressure [Diplopia] : no diplopia [Ear Fullness] : no ear fullness [Allergic Rhinitis] : no allergic rhinitis [Maxillary Tooth Infection] : no maxillary tooth infection [Septal Deviation] : no septal deviation [Environmental Allergies] : no environmental allergies [Seasonal Allergies] : no seasonal allergies [Environmental Allergens] : no environmental allergens [Adenoidectomy] : no adenoidectomy [Nasal FB Removal] : no nasal foreign body removal [Allergies] : no allergies [Asthma] : no asthma [Neck Mass] : no neck mass [Neck Pain] : no neck pain [Chills] : no chills [Cold Intolerance] : no cold intolerance [Cough] : no cough [Fatigue] : no fatigue [Heat Intolerance] : no heat intolerance [Hyperthyroidism] : no hyperthyroidism [Sialadenitis] : no sialadenitis [Hodgkin Disease] : no hodgkin disease [Non-Hodgkin Lymphoma] : no non-hodgkin lymphoma [Tobacco Use] : no tobacco use [Alcohol Use] : no alcohol use [Graves Disease] : no graves disease [Thyroid Cancer] : no thyroid cancer

## 2021-05-03 NOTE — PHYSICAL EXAM
[Nasal Endoscopy Performed] : nasal endoscopy was performed, see procedure section for findings [] : septum deviated to the left [Midline] : trachea located in midline position [Normal] : gait was normal [de-identified] : B/L tenderness  [Hearing Loss Right Only] : normal [Hearing Loss Left Only] : normal [Nystagmus] : ~T no ~M nystagmus was seen [Fukuda Step Test] : Fukuda Step Test was Negative

## 2021-05-03 NOTE — DATA REVIEWED
[de-identified] : bilat SNHL\par sl L>R\par Hearing Test performed to evaluate the extent of hearing loss and  to explain pt's symptoms\par

## 2021-05-03 NOTE — ASSESSMENT
[FreeTextEntry1] : Patient with hx of vertigo presents for follow up, finished vestibular rehab \par Vertigo\par -now resolved \par \par DNS and Rhinitis\par -Rx: Steam humidification and hypertonic saline \par \par f/u prn

## 2021-05-05 ENCOUNTER — APPOINTMENT (OUTPATIENT)
Dept: THORACIC SURGERY | Facility: CLINIC | Age: 76
End: 2021-05-05
Payer: MEDICARE

## 2021-05-05 VITALS
WEIGHT: 124 LBS | BODY MASS INDEX: 23.41 KG/M2 | SYSTOLIC BLOOD PRESSURE: 151 MMHG | TEMPERATURE: 97.9 F | HEART RATE: 80 BPM | OXYGEN SATURATION: 96 % | HEIGHT: 61 IN | DIASTOLIC BLOOD PRESSURE: 77 MMHG

## 2021-05-05 DIAGNOSIS — R91.1 SOLITARY PULMONARY NODULE: ICD-10-CM

## 2021-05-05 PROCEDURE — 99214 OFFICE O/P EST MOD 30 MIN: CPT

## 2021-05-05 NOTE — PHYSICAL EXAM
[Auscultation Breath Sounds / Voice Sounds] : lungs were clear to auscultation bilaterally [Heart Rate And Rhythm] : heart rate was normal and rhythm regular [Heart Sounds] : normal S1 and S2 [Heart Sounds Gallop] : no gallops [Murmurs] : no murmurs [Heart Sounds Pericardial Friction Rub] : no pericardial rub [Examination Of The Chest] : the chest was normal in appearance [Chest Visual Inspection Thoracic Asymmetry] : no chest asymmetry [Diminished Respiratory Excursion] : normal chest expansion [Bowel Sounds] : normal bowel sounds [Abdomen Soft] : soft [Abdomen Tenderness] : non-tender [Abdomen Mass (___ Cm)] : no abdominal mass palpated [Skin Color & Pigmentation] : normal skin color and pigmentation [Skin Turgor] : normal skin turgor [] : no rash

## 2021-05-06 NOTE — HISTORY OF PRESENT ILLNESS
[FreeTextEntry1] : Ms. MARIFER BENTLEY, 76 year old female, former smoker (2 PPD x 3 years; Quit 1970), w/ hx of breast cancer s/p lumpectomy and radiation 2003, who was undergoing routine evaluation of her breast that revealed 0.9 cm RML LUNG LESION on MRI Breast 4/13/2021, with mildly avidity confirmed on PET/CT 4/16/21. The patient is otherwise in good health. Last year she was hospitalized at [facility] for chest pain and underwent a negative coronary angiogram. \par \par CT chest from 6/30/2011\par - Previously noted tubular opacity in RML on 10/31/2006 has decreased in length\par - Adjacent small tubular opacities are noted in the adjacent lung field, unchanged\par - Calcified granuloma again noted in RLL\par - Subcentimeter hypodensity in the RLL appears unchanged\par \par MRI Breast 4/13/2021\par - 0.9 cm enhancing mass in the RML (series 6, image 34)\par - 1.5 cm enhancing mass in the right lobe of liver\par \par PET/CT on 4/16/2021:\par - 8 x 8 mm round nodule in RML SUV 0.9 (4:43)\par \par Of note, she has several cysts of her liver which have been known for a number of years and confirmed with a recent MRI also.\par \par Of note, she was last seen for initial visit 1 week ago on 4/28/2021; Report of 2011 CT chest noting right middle lobe decreasing tubular lung nodules. Discussed with patient and  that reviewing 2011 imaging will be helpful in deciphering nodule stability. Office has requested imaging from [facility]. \par \par Patient is here today for a follow up.

## 2021-05-06 NOTE — DATA REVIEWED
[FreeTextEntry1] : I independently reviewed the 2011 imaging:\par CT chest from 6/30/2011\par - Previously noted tubular opacity in RML on 10/31/2006 has decreased in length\par - Adjacent small tubular opacities are noted in the adjacent lung field, unchanged\par - Calcified granuloma again noted in RLL\par - Subcentimeter hypodensity in the RLL appears unchanged

## 2021-05-06 NOTE — ASSESSMENT
[FreeTextEntry1] : Ms. MARIFER BENTLEY, 76 year old female, former smoker (2 PPD x 3 years; Quit 1970), w/ hx of breast cancer s/p lumpectomy and radiation 2003, who was undergoing routine evaluation of her breast that revealed 0.9 cm RML LUNG LESION on MRI Breast 4/13/2021, with mildly avidity confirmed on PET/CT 4/16/21. The patient is otherwise in good health. Last year she was hospitalized at [facility] for chest pain and underwent a negative coronary angiogram. \par \par I reviewed that CT chest from 2011 and compared it with PET/CT from 4/15/21. There was really no nodule seen from 2011. So I would like to repeat the scan in about 1 month. If the nodule get smaller, then I would just do surveillance scans to monitor it. If the nodule stays the same or grows larger, I would recommend to surgically remove it. She understands and agrees to this plan. \par \par \par I personally performed the services described in the documentation, reviewed the documentation recorded by the scribe in my presence and it accurately and completely records my words and actions. \par \par I, Odilia Alegre NP, am scribing for and the presence of Dr. Vernon Wilkins the following sections, HISTORY OF PRESENT ILLNESS, PAST MEDICAL/FAMILY/SOCIAL HISTORY; REVIEW OF SYSTEMS; VITAL SIGNS; PHYSICAL EXAM; DISPOSITION.\par \par

## 2021-05-12 DIAGNOSIS — Z01.818 ENCOUNTER FOR OTHER PREPROCEDURAL EXAMINATION: ICD-10-CM

## 2021-05-14 ENCOUNTER — APPOINTMENT (OUTPATIENT)
Dept: DISASTER EMERGENCY | Facility: CLINIC | Age: 76
End: 2021-05-14

## 2021-05-14 LAB — SARS-COV-2 N GENE NPH QL NAA+PROBE: NOT DETECTED

## 2021-05-15 ENCOUNTER — APPOINTMENT (OUTPATIENT)
Dept: CT IMAGING | Facility: IMAGING CENTER | Age: 76
End: 2021-05-15
Payer: MEDICARE

## 2021-05-15 ENCOUNTER — OUTPATIENT (OUTPATIENT)
Dept: OUTPATIENT SERVICES | Facility: HOSPITAL | Age: 76
LOS: 1 days | End: 2021-05-15
Payer: MEDICARE

## 2021-05-15 DIAGNOSIS — Z90.710 ACQUIRED ABSENCE OF BOTH CERVIX AND UTERUS: Chronic | ICD-10-CM

## 2021-05-15 DIAGNOSIS — Z98.89 OTHER SPECIFIED POSTPROCEDURAL STATES: Chronic | ICD-10-CM

## 2021-05-15 DIAGNOSIS — R91.1 SOLITARY PULMONARY NODULE: ICD-10-CM

## 2021-05-15 PROCEDURE — G1004: CPT

## 2021-05-15 PROCEDURE — 71250 CT THORAX DX C-: CPT

## 2021-05-15 PROCEDURE — 71250 CT THORAX DX C-: CPT | Mod: 26,ME

## 2021-05-18 ENCOUNTER — APPOINTMENT (OUTPATIENT)
Dept: PULMONOLOGY | Facility: CLINIC | Age: 76
End: 2021-05-18
Payer: MEDICARE

## 2021-05-18 PROCEDURE — 94729 DIFFUSING CAPACITY: CPT

## 2021-05-18 PROCEDURE — 94010 BREATHING CAPACITY TEST: CPT

## 2021-05-18 PROCEDURE — 94726 PLETHYSMOGRAPHY LUNG VOLUMES: CPT

## 2021-05-18 NOTE — CONSULT LETTER
[FreeTextEntry2] : Dr. Jean Elizabeth (PULM/ref)\par Dr. Jordin Lebron MD (ONC)\par Dr. Tawny Cedillo (PCP) \par  [FreeTextEntry3] : Vernon Wilkins MD, FACS \par , Division of Thoracic Surgery \par [facility] \par Chief, Thoracic Surgery \par [facility] \par Department of Cardiovascular & Thoracic Surgery \par  \par [facility] [employer] [facility]\par   [Dear  ___] : Dear  [unfilled], [Courtesy Letter:] : I had the pleasure of seeing your patient, [unfilled], in my office today. [Please see my note below.] : Please see my note below. [Sincerely,] : Sincerely,

## 2021-05-19 ENCOUNTER — APPOINTMENT (OUTPATIENT)
Dept: THORACIC SURGERY | Facility: CLINIC | Age: 76
End: 2021-05-19
Payer: MEDICARE

## 2021-05-19 VITALS
HEART RATE: 87 BPM | BODY MASS INDEX: 23.41 KG/M2 | TEMPERATURE: 97.6 F | HEIGHT: 61 IN | WEIGHT: 124 LBS | DIASTOLIC BLOOD PRESSURE: 78 MMHG | OXYGEN SATURATION: 98 % | RESPIRATION RATE: 18 BRPM | SYSTOLIC BLOOD PRESSURE: 139 MMHG

## 2021-05-19 PROCEDURE — 99214 OFFICE O/P EST MOD 30 MIN: CPT

## 2021-05-19 RX ORDER — MONTELUKAST SODIUM 10 MG/1
10 TABLET, FILM COATED ORAL
Refills: 0 | Status: DISCONTINUED | COMMUNITY
End: 2021-05-19

## 2021-05-19 RX ORDER — CALCITRIOL 1 UG/ML
1 SOLUTION ORAL
Refills: 0 | Status: DISCONTINUED | COMMUNITY
End: 2021-05-19

## 2021-05-19 NOTE — PHYSICAL EXAM
[] : no respiratory distress [Respiration, Rhythm And Depth] : normal respiratory rhythm and effort [Exaggerated Use Of Accessory Muscles For Inspiration] : no accessory muscle use [Auscultation Breath Sounds / Voice Sounds] : lungs were clear to auscultation bilaterally [Heart Rate And Rhythm] : heart rate was normal and rhythm regular [Examination Of The Chest] : the chest was normal in appearance [Chest Visual Inspection Thoracic Asymmetry] : no chest asymmetry [Diminished Respiratory Excursion] : normal chest expansion [Abdomen Soft] : soft [Oriented To Time, Place, And Person] : oriented to person, place, and time [Impaired Insight] : insight and judgment were intact [Affect] : the affect was normal [Mood] : the mood was normal [Memory Recent] : recent memory was not impaired [Memory Remote] : remote memory was not impaired

## 2021-05-21 NOTE — CONSULT LETTER
[FreeTextEntry2] : Dr. Jean Elizabeth (PULM/ref)\par Dr. Jordin Lebron MD (ONC)\par Dr. Tawny Cedillo (PCP) \par  [FreeTextEntry3] : Vernon Wilkins MD, FACS \par , Division of Thoracic Surgery \par [facility] \par Chief, Thoracic Surgery \par [facility] \par Department of Cardiovascular & Thoracic Surgery \par  \par [facility] [employer] [facility]\par

## 2021-05-21 NOTE — ASSESSMENT
[FreeTextEntry1] : Ms. MARIFER BENTLEY, 76 year old female, former smoker (2 PPD x 3 years; Quit 1970), w/ hx of breast cancer s/p lumpectomy and radiation 2003, who was undergoing routine evaluation of her breast that revealed 0.9 cm RML LUNG LESION on MRI Breast 4/13/2021, with mildly avidity confirmed on PET/CT 4/16/21. The patient is otherwise in good health. Recommended to return to clinic with follow up CT to re-evaluate nodule. \par \par I have independently reviewed the medical records and imaging at the time of this office consultation. Nodule in RML suspicious. Discussed differential diagnosis including: infection, breast ca recurrence or primary lung ca. In order to completely rule out malignancy, a surgical biopsy is advised. Reviewed the options of a CT guided biopsy vs surgical biopsy and the significance of a non-diagnostic/negative biopsy as not being sufficient to exclude malignancy. Therefore, recommended a Right VATS, Right middle lobe wedge resection, possible right middle lobectomy; mediastinal lymph node dissection. Risks, benefits and alternatives explained to patient; All questions answered and patient agrees to proceed with surgery. Medical clearance and COVID -19 screening required prior to surgery. Pre operative teaching performed by NP. Incentive spirometer education provided with pt returning demonstration; Baseline level at: 1500ml. Patient w/ no known hx of diabetes. Random glucose from 10/2020 < 120. Patient qualifies for pre-operative nutrition supplement. However, she relays that sweetened beverages make her nauseous; Supplement held. \par \par I personally performed the services described in the documentation, reviewed the documentation recorded by the scribe in my presence and it accurately and completely records my words and actions.\par \par ART, Alicia De La Rosa, ANP-C, am scribing for and the presence of HILTON Dowd, the following sections HISTORY OF PRESENT ILLNESS, PAST MEDICAL/FAMILY/SOCIAL HISTORY; REVIEW OF SYSTEMS; VITAL SIGNS; PHYSICAL EXAM; DISPOSITION.\par \par \par  \par \par \par \par \par

## 2021-05-21 NOTE — DATA REVIEWED
[FreeTextEntry1] : Independently reviewed the following imaging:\par - PET/CT on 4/16/21\par - CT scan on 5/15/21\par

## 2021-05-21 NOTE — HISTORY OF PRESENT ILLNESS
[FreeTextEntry1] : Ms. MARIFER BENTLEY, 76 year old female, former smoker (2 PPD x 3 years; Quit 1970), w/ hx of breast cancer s/p lumpectomy and radiation 2003, who was undergoing routine evaluation of her breast that revealed 0.9 cm RML LUNG LESION on MRI Breast 4/13/2021, with mildly avidity confirmed on PET/CT 4/16/21. The patient is otherwise in good health. Last year 2020 she was hospitalized at [facility] for chest pain and underwent a negative coronary angiogram. \par \par CT chest from 6/30/2011\par - Previously noted tubular opacity in RML on 10/31/2006 has decreased in length\par - Adjacent small tubular opacities are noted in the adjacent lung field, unchanged\par - Calcified granuloma again noted in RLL\par - Subcentimeter hypodensity in the RLL appears unchanged\par \par MRI Breast 4/13/2021\par - 0.9 cm enhancing mass in the RML (series 6, image 34)\par - 1.5 cm enhancing mass in the right lobe of liver\par \par PET/CT on 4/16/2021:\par - 8 x 8 mm round nodule in RML SUV 0.9 (4:43)\par \par Of note, she has several cysts of her liver which have been known for a number of years and confirmed with a recent MRI also.\par \par CT Chest on 5/15/2021: NOT COMPARED TO PRIOR\par - RML 9 x 9 mm noncalcified nodule\par - RLL 4 mm triangular shaped opacities (2:97) likely represent lymph nodes\par \par Of note, upon last visit 5/5/2021; I reviewed that CT chest from 2011 and compared it with PET/CT from 4/15/21. There was really no nodule seen from 2011. So I recommended repeat the scan in about 1 month. If the nodule decreases in size, then I would just do surveillance scans to monitor it. If the nodule stays the same or grows larger, I would recommend surgical resection.\par \par Patient is here today for a follow up. Patient denies worsening SOB, chest pain, cough, hemoptysis, fever, chills, night sweats, lightheadedness or dizziness.\par

## 2021-05-25 ENCOUNTER — OUTPATIENT (OUTPATIENT)
Dept: OUTPATIENT SERVICES | Facility: HOSPITAL | Age: 76
LOS: 1 days | End: 2021-05-25

## 2021-05-25 VITALS
SYSTOLIC BLOOD PRESSURE: 124 MMHG | RESPIRATION RATE: 16 BRPM | OXYGEN SATURATION: 98 % | HEART RATE: 93 BPM | TEMPERATURE: 98 F | HEIGHT: 61 IN | WEIGHT: 126.1 LBS | DIASTOLIC BLOOD PRESSURE: 80 MMHG

## 2021-05-25 DIAGNOSIS — R91.1 SOLITARY PULMONARY NODULE: ICD-10-CM

## 2021-05-25 DIAGNOSIS — Z98.89 OTHER SPECIFIED POSTPROCEDURAL STATES: Chronic | ICD-10-CM

## 2021-05-25 DIAGNOSIS — Z98.890 OTHER SPECIFIED POSTPROCEDURAL STATES: Chronic | ICD-10-CM

## 2021-05-25 DIAGNOSIS — R91.8 OTHER NONSPECIFIC ABNORMAL FINDING OF LUNG FIELD: Chronic | ICD-10-CM

## 2021-05-25 DIAGNOSIS — Z90.710 ACQUIRED ABSENCE OF BOTH CERVIX AND UTERUS: Chronic | ICD-10-CM

## 2021-05-25 DIAGNOSIS — Z90.49 ACQUIRED ABSENCE OF OTHER SPECIFIED PARTS OF DIGESTIVE TRACT: Chronic | ICD-10-CM

## 2021-05-25 LAB
ALBUMIN SERPL ELPH-MCNC: 4.6 G/DL — SIGNIFICANT CHANGE UP (ref 3.3–5)
ALP SERPL-CCNC: 57 U/L — SIGNIFICANT CHANGE UP (ref 40–120)
ALT FLD-CCNC: 12 U/L — SIGNIFICANT CHANGE UP (ref 4–33)
ANION GAP SERPL CALC-SCNC: 12 MMOL/L — SIGNIFICANT CHANGE UP (ref 7–14)
AST SERPL-CCNC: 17 U/L — SIGNIFICANT CHANGE UP (ref 4–32)
BILIRUB SERPL-MCNC: <0.2 MG/DL — SIGNIFICANT CHANGE UP (ref 0.2–1.2)
BLD GP AB SCN SERPL QL: NEGATIVE — SIGNIFICANT CHANGE UP
BUN SERPL-MCNC: 23 MG/DL — SIGNIFICANT CHANGE UP (ref 7–23)
CALCIUM SERPL-MCNC: 10.3 MG/DL — SIGNIFICANT CHANGE UP (ref 8.4–10.5)
CHLORIDE SERPL-SCNC: 101 MMOL/L — SIGNIFICANT CHANGE UP (ref 98–107)
CO2 SERPL-SCNC: 27 MMOL/L — SIGNIFICANT CHANGE UP (ref 22–31)
CREAT SERPL-MCNC: 0.71 MG/DL — SIGNIFICANT CHANGE UP (ref 0.5–1.3)
GLUCOSE SERPL-MCNC: 96 MG/DL — SIGNIFICANT CHANGE UP (ref 70–99)
POTASSIUM SERPL-MCNC: 4.1 MMOL/L — SIGNIFICANT CHANGE UP (ref 3.5–5.3)
POTASSIUM SERPL-SCNC: 4.1 MMOL/L — SIGNIFICANT CHANGE UP (ref 3.5–5.3)
PROT SERPL-MCNC: 7.2 G/DL — SIGNIFICANT CHANGE UP (ref 6–8.3)
RH IG SCN BLD-IMP: POSITIVE — SIGNIFICANT CHANGE UP
SODIUM SERPL-SCNC: 140 MMOL/L — SIGNIFICANT CHANGE UP (ref 135–145)

## 2021-05-25 RX ORDER — SODIUM CHLORIDE 9 MG/ML
1000 INJECTION, SOLUTION INTRAVENOUS
Refills: 0 | Status: DISCONTINUED | OUTPATIENT
Start: 2021-06-03 | End: 2021-06-04

## 2021-05-25 NOTE — H&P PST ADULT - HISTORY OF PRESENT ILLNESS
75 y/o female, former smoker 2 PPD x3 years, stopped in 1960's.  Hx of breast cancer (R) , s/p lumpectomy and Rad tx 2003.  Recent imaging was done, RML lung mass discovered.  Scheduled for Right VATS, middle lobe wedge resection, possible right middle lobectomy, mediastinal lymph node dissection

## 2021-05-25 NOTE — H&P PST ADULT - NSICDXPASTSURGICALHX_GEN_ALL_CORE_FT
PAST SURGICAL HISTORY:  H/O lumpectomy (RL) 2003    H/O lumpectomy     H/O total hysterectomy     History of appendectomy     Status post Mohs surgery      PAST SURGICAL HISTORY:  H/O lumpectomy (R) breast 2003    H/O total hysterectomy     History of appendectomy     Status post Mohs surgery      PAST SURGICAL HISTORY:  H/O basal cell carcinoma excision 2015 groin    H/O lumpectomy (R) breast 2003    H/O ovarian cystectomy (R) 1981    H/O total hysterectomy 1987    History of appendectomy     S/P appendectomy peritonitis 1980    Status post Mohs surgery nose 2011    Status post phlebectomy RLE 2003     High Dose Vitamin A Counseling: Side effects reviewed, pt to contact office should one occur.

## 2021-05-25 NOTE — H&P PST ADULT - NSICDXPROBLEM_GEN_ALL_CORE_FT
R/O PROBLEM DIAGNOSES  Problem: R/O Lung mass  Assessment and Plan:  Right VATS, middle lobe wedge resection, possible right middle lobectomy, mediastinal lymph node dissection    CBC CMP T&S  EKG  Preop instructions and antibacterial soap given and explained (verbal and written), with teach back.   Pt reports she was seen by PMD for pre-op eval, requested on chart  Pt reports she is followed by Dr. Green, [facility].  Most recent note requested on chart.  Pt had Angiogram, and echo 2020, requested on chart   PCN allergy, iodine contrast media allergy OR booking informed

## 2021-05-25 NOTE — H&P PST ADULT - NEGATIVE GENERAL GENITOURINARY SYMPTOMS
no hematuria/no dysuria/normal urinary frequency no hematuria/no flank pain L/no flank pain R/no dysuria/normal urinary frequency

## 2021-05-25 NOTE — H&P PST ADULT - ANESTHESIA, PREVIOUS REACTION, PROFILE
unable to speak when given sedation prior to being put under unable to speak when given sedation prior to being put under/nausea/vomiting

## 2021-05-25 NOTE — H&P PST ADULT - NSICDXPASTMEDICALHX_GEN_ALL_CORE_FT
PAST MEDICAL HISTORY:  Adhesion of intestine     Breast cancer (R) 2003 lumpectomy    Constipation     GERD (gastroesophageal reflux disease)     History of proctitis     Osteoporosis     Urinary incontinence in female      PAST MEDICAL HISTORY:  Adhesion of intestine     Breast cancer (R) 2003 lumpectomy    Constipation     GERD (gastroesophageal reflux disease)     Glaucoma     History of proctitis     Lung mass (R)    Osteoporosis     Urinary incontinence in female

## 2021-05-25 NOTE — H&P PST ADULT - MALLAMPATI CLASS
Class IV (difficult) - the soft palate is not visible at all uvula not visualized on phonation/Class IV (difficult) - the soft palate is not visible at all

## 2021-05-25 NOTE — H&P PST ADULT - ASSESSMENT
77 y/o female, former smoker 2 PPD x3 years, stopped in 1960's.  Hx of breast cancer (R) , s/p lumpectomy and Rad tx 2003.  Recent imaging was done, RML lung mass discovered.  Scheduled for Right VATS, middle lobe wedge resection, possible right middle lobectomy, mediastinal lymph node dissection

## 2021-05-31 ENCOUNTER — APPOINTMENT (OUTPATIENT)
Dept: DISASTER EMERGENCY | Facility: CLINIC | Age: 76
End: 2021-05-31

## 2021-06-01 LAB — SARS-COV-2 N GENE NPH QL NAA+PROBE: NOT DETECTED

## 2021-06-02 NOTE — ASU PATIENT PROFILE, ADULT - NS PREOP UNDERSTANDS INFO
"Subjective:       Patient ID: Yayo Jasmine Jr. is a 66 y.o. male.    Chief Complaint: Rash and Itching    Patient is new to me in the [facility] system. He presents with a fix pruritic maculopapular rash on his arms and the nape of his neck. Several areas are excoriated and in multiple stages of healing and new areas erupting. Symptoms began 1 month or a little longer. No clear identified trigger. Thought it was bug bites, bed bugs or fleas. Changed linens and no fleas on the dog. Used bug bomb in the bedroom. Feels he has eradicated an insect as potential cause. Dr. Mckenzie Cantu (with Dr. Jj) treated with topical steroid cream with little benefit then tried an additional cream "stronger" that helps a little more with the itching. No biopsy done to date. In addition,  to current medications, he takes multiple supplements. He was drinking a milk whey based protein shake, but has stopped by recommendation of Dr. Cantu. He had eaten oyster and shrimp around the time of initial symptoms. Food trigger is unclear. Due to pattern suspect a urticarial dermatitis, etiology unclear. If symptoms persist discussed strongly suggest getting biopsy of a site including IgE, complement, and Immunofluorescence to rule out vasculitis via dermatology.  No history of atopy or allergic rhinitis. Had recent onset of episodes of "hayfever" which he took Claritin-D and symptoms resolved eventually with PRN use. Has not been skin tested in the past. Did not have patch testing with dermatology.    Past records reviewed. No Dermatology notes in epic.      Review of Systems   Constitutional: Negative.  Negative for activity change, appetite change, chills, diaphoresis, fatigue, fever and unexpected weight change.   HENT: Negative.  Negative for congestion, dental problem, drooling, ear discharge, ear pain, facial swelling, hearing loss, mouth sores, nosebleeds, postnasal drip, rhinorrhea, sinus pressure, sneezing, sore throat, " tinnitus, trouble swallowing and voice change.    Eyes: Negative.  Negative for photophobia, pain, discharge, redness, itching and visual disturbance.   Respiratory: Negative.  Negative for apnea, cough, choking, chest tightness, shortness of breath, wheezing and stridor.    Cardiovascular: Negative.  Negative for chest pain, palpitations and leg swelling.   Gastrointestinal: Negative.  Negative for abdominal distention, abdominal pain, constipation, diarrhea, nausea and vomiting.   Endocrine: Negative.  Negative for cold intolerance, heat intolerance, polydipsia, polyphagia and polyuria.   Genitourinary: Negative.  Negative for decreased urine volume, difficulty urinating, dysuria, enuresis, frequency and urgency.   Musculoskeletal: Negative.  Negative for arthralgias, back pain, gait problem, joint swelling, myalgias, neck pain and neck stiffness.   Skin: Negative.  Negative for color change, pallor, rash and wound.   Allergic/Immunologic: Positive for environmental allergies and food allergies. Negative for immunocompromised state.   Neurological: Negative.  Negative for dizziness, tremors, seizures, syncope, facial asymmetry, speech difficulty, weakness, light-headedness, numbness and headaches.   Hematological: Negative.  Negative for adenopathy. Does not bruise/bleed easily.   Psychiatric/Behavioral: Negative.  Negative for agitation, behavioral problems, confusion, decreased concentration, dysphoric mood, hallucinations, self-injury, sleep disturbance and suicidal ideas. The patient is not nervous/anxious and is not hyperactive.      Objective:   Physical Exam   Constitutional: He is oriented to person, place, and time. He appears well-developed and well-nourished. He is active and cooperative.  Non-toxic appearance. He does not have a sickly appearance. He does not appear ill. No distress.   HENT:   Head: Normocephalic and atraumatic. Head is without abrasion, without right periorbital erythema and without  left periorbital erythema.   Right Ear: Hearing, tympanic membrane, external ear and ear canal normal. No lacerations. No drainage, swelling or tenderness. No foreign bodies. No mastoid tenderness. Tympanic membrane is not injected, not scarred, not perforated, not erythematous, not retracted and not bulging. Tympanic membrane mobility is normal. No middle ear effusion. No decreased hearing is noted.   Left Ear: Hearing, tympanic membrane, external ear and ear canal normal. No lacerations. No drainage, swelling or tenderness. No foreign bodies. No mastoid tenderness. Tympanic membrane is not injected, not scarred, not perforated, not erythematous, not retracted and not bulging. Tympanic membrane mobility is normal.  No middle ear effusion. No decreased hearing is noted.   Nose: Nose normal. No mucosal edema, rhinorrhea, nose lacerations, sinus tenderness, nasal deformity, septal deviation or nasal septal hematoma. No epistaxis.  No foreign bodies. Right sinus exhibits no maxillary sinus tenderness and no frontal sinus tenderness. Left sinus exhibits no maxillary sinus tenderness and no frontal sinus tenderness.   Mouth/Throat: Uvula is midline, oropharynx is clear and moist and mucous membranes are normal. He does not have dentures. No oral lesions. No trismus in the jaw. No dental abscesses, uvula swelling, lacerations or dental caries. No oropharyngeal exudate, posterior oropharyngeal edema, posterior oropharyngeal erythema or tonsillar abscesses. No tonsillar exudate.   Eyes: Conjunctivae, EOM and lids are normal. Pupils are equal, round, and reactive to light. Right eye exhibits no chemosis, no discharge and no exudate. Left eye exhibits no chemosis, no discharge and no exudate. Right conjunctiva is not injected. Right conjunctiva has no hemorrhage. Left conjunctiva is not injected. Left conjunctiva has no hemorrhage. No scleral icterus.   Neck: Trachea normal, normal range of motion, full passive range of  motion without pain and phonation normal. No tracheal tenderness and no muscular tenderness present. No neck rigidity. No tracheal deviation, no edema, no erythema and normal range of motion present. No thyroid mass and no thyromegaly present.   Cardiovascular: Normal rate, regular rhythm, normal heart sounds and normal pulses.  Exam reveals no gallop and no friction rub.    No murmur heard.  Pulmonary/Chest: Effort normal and breath sounds normal. No accessory muscle usage or stridor. No apnea, no tachypnea and no bradypnea. He has no decreased breath sounds. He has no wheezes. He has no rhonchi. He has no rales. He exhibits no tenderness.   Abdominal: Soft. Normal appearance and bowel sounds are normal. He exhibits no distension. There is no tenderness. There is no rigidity, no rebound, no guarding and no CVA tenderness.   Musculoskeletal: Normal range of motion. He exhibits no edema, tenderness or deformity.   Lymphadenopathy:        Head (right side): No submental, no submandibular, no tonsillar, no preauricular, no posterior auricular and no occipital adenopathy present.        Head (left side): No submental, no submandibular, no tonsillar, no preauricular, no posterior auricular and no occipital adenopathy present.     He has no cervical adenopathy.        Right cervical: No superficial cervical, no deep cervical and no posterior cervical adenopathy present.       Left cervical: No superficial cervical, no deep cervical and no posterior cervical adenopathy present.        Right: No supraclavicular and no epitrochlear adenopathy present.        Left: No supraclavicular and no epitrochlear adenopathy present.   Neurological: He is alert and oriented to person, place, and time. He has normal strength. He is not disoriented. No cranial nerve deficit. He exhibits normal muscle tone. Coordination and gait normal.   Skin: Skin is warm and dry. Capillary refill takes less than 2 seconds. Rash noted. No abrasion, no  bruising, no laceration, no lesion, no petechiae and no purpura noted. Rash is maculopapular and urticarial. Rash is not macular, not papular, not nodular, not pustular and not vesicular. He is not diaphoretic. No cyanosis or erythema. No pallor. Nails show no clubbing.   diffuse maculopapular fixed pruritic rash on bilateral arms and the nape of the neck. Excoriated in several areas in multiple stages of healing. With new areas of urticaria errupting. Patient reports Id type reaction where new areas stimulate past areas of pruritis.   Psychiatric: He has a normal mood and affect. His speech is normal and behavior is normal. Judgment and thought content normal. His mood appears not anxious. His affect is not inappropriate. Cognition and memory are normal. He does not express impulsivity or inappropriate judgment. He expresses no homicidal and no suicidal ideation. He is attentive.   Nursing note and vitals reviewed.    Assessment:     1. Urticaria    2. Itching    3. Food allergy    4. Encounter for other specified special examinations       Plan:   Yayo was seen today for rash and itching.    Diagnoses and all orders for this visit:    Urticaria  -     C3 complement; Future  -     C4 complement; Future  -     Complement, total; Future  -     IgE; Future  -     THYROID STIMULATING IMMUNOGLOBULIN; Future  -     THYROID PEROXIDASE ANTIBODY; Future  -     THYROGLOBULIN AB SCREEN; Future  -     Sedimentation rate, manual; Future  -     RHEUMATOID FACTOR; Future  -     Protein electrophoresis, serum; Future  -     C1 ESTERASE INHIBITOR PANEL; Future  -     C1 ESTERASE INHIBITOR, FUNCTIONAL; Future  -     C2 COMPLEMENT, FUNCTIONAL; Future  -     C-reactive protein; Future  -     CU (Chronic Urticaria) Index Panel; Future  -     HEPATITIS PANEL, ACUTE; Future    Itching    Food allergy  -     Shrimp IgE; Future  -     Crab IgE; Future  -     Lobster IgE; Future  -     Crayfish, freshwater IgE; Future  -     Oyster IgE;  Future  -     Gluten IgE; Future  -     Wheat IgE; Future  -     Milk IgE; Future  -     Allergen, Milk Components IGE; Future  -     Blue cheese (mold cheese) IgE; Future  -     Whey IgE; Future  -     Soybean IgE; Future  -     Egg, white IgE; Future  -     Egg, yolk IgE; Future  -     Allergen, Egg Components IGE; Future  -     Beef IgE; Future    Encounter for other specified special examinations   -     HEPATITIS PANEL, ACUTE; Future      Return in about 7 weeks (around 8/28/2017) for 60 min EEP for skin testing inhalant and food hold all antihistamines 5 days prior to skin testing.    Discussed options and strategies  Reviewed medications risk v. Benefit  Reviewed pathophysiology  All questions answered  Emotional support provided  Pt verbalized understanding of all the above and treatment plan.      FIOR Saez   yes

## 2021-06-02 NOTE — ASU PATIENT PROFILE, ADULT - PSH
H/O basal cell carcinoma excision  2015 groin  H/O lumpectomy  (R) breast 2003  H/O ovarian cystectomy  (R) 1981  H/O total hysterectomy  1987  History of appendectomy    S/P appendectomy  peritonitis 1980  Status post Mohs surgery  nose 2011  Status post phlebectomy  RLE 2003

## 2021-06-02 NOTE — ASU PATIENT PROFILE, ADULT - PMH
Adhesion of intestine    Breast cancer  (R) 2003 lumpectomy  Constipation    GERD (gastroesophageal reflux disease)    Glaucoma    History of proctitis    Lung mass  (R)  Osteoporosis    Urinary incontinence in female

## 2021-06-02 NOTE — ASU PATIENT PROFILE, ADULT - FALLEN IN THE PAST
at bedside. no

## 2021-06-03 ENCOUNTER — RESULT REVIEW (OUTPATIENT)
Age: 76
End: 2021-06-03

## 2021-06-03 ENCOUNTER — APPOINTMENT (OUTPATIENT)
Dept: THORACIC SURGERY | Facility: HOSPITAL | Age: 76
End: 2021-06-03

## 2021-06-03 ENCOUNTER — INPATIENT (INPATIENT)
Facility: HOSPITAL | Age: 76
LOS: 3 days | Discharge: HOME | End: 2021-06-07
Attending: THORACIC SURGERY (CARDIOTHORACIC VASCULAR SURGERY) | Admitting: THORACIC SURGERY (CARDIOTHORACIC VASCULAR SURGERY)
Payer: MEDICARE

## 2021-06-03 VITALS
DIASTOLIC BLOOD PRESSURE: 68 MMHG | RESPIRATION RATE: 15 BRPM | SYSTOLIC BLOOD PRESSURE: 142 MMHG | HEIGHT: 61 IN | HEART RATE: 80 BPM | TEMPERATURE: 98 F | OXYGEN SATURATION: 100 % | WEIGHT: 126.1 LBS

## 2021-06-03 DIAGNOSIS — Z98.89 OTHER SPECIFIED POSTPROCEDURAL STATES: Chronic | ICD-10-CM

## 2021-06-03 DIAGNOSIS — Z90.49 ACQUIRED ABSENCE OF OTHER SPECIFIED PARTS OF DIGESTIVE TRACT: Chronic | ICD-10-CM

## 2021-06-03 DIAGNOSIS — R91.1 SOLITARY PULMONARY NODULE: ICD-10-CM

## 2021-06-03 DIAGNOSIS — Z98.890 OTHER SPECIFIED POSTPROCEDURAL STATES: Chronic | ICD-10-CM

## 2021-06-03 DIAGNOSIS — Z90.710 ACQUIRED ABSENCE OF BOTH CERVIX AND UTERUS: Chronic | ICD-10-CM

## 2021-06-03 PROCEDURE — 88342 IMHCHEM/IMCYTCHM 1ST ANTB: CPT | Mod: 26

## 2021-06-03 PROCEDURE — 99233 SBSQ HOSP IP/OBS HIGH 50: CPT

## 2021-06-03 PROCEDURE — 88305 TISSUE EXAM BY PATHOLOGIST: CPT | Mod: 26

## 2021-06-03 PROCEDURE — 88341 IMHCHEM/IMCYTCHM EA ADD ANTB: CPT | Mod: 26

## 2021-06-03 PROCEDURE — 88331 PATH CONSLTJ SURG 1 BLK 1SPC: CPT | Mod: 26

## 2021-06-03 PROCEDURE — 88309 TISSUE EXAM BY PATHOLOGIST: CPT | Mod: 26

## 2021-06-03 PROCEDURE — 71045 X-RAY EXAM CHEST 1 VIEW: CPT | Mod: 26

## 2021-06-03 PROCEDURE — 32674 THORACOSCOPY LYMPH NODE EXC: CPT

## 2021-06-03 PROCEDURE — 32663 THORACOSCOPY W/LOBECTOMY: CPT

## 2021-06-03 RX ORDER — ACETAMINOPHEN 500 MG
1000 TABLET ORAL ONCE
Refills: 0 | Status: COMPLETED | OUTPATIENT
Start: 2021-06-03 | End: 2021-06-04

## 2021-06-03 RX ORDER — GABAPENTIN 400 MG/1
100 CAPSULE ORAL ONCE
Refills: 0 | Status: COMPLETED | OUTPATIENT
Start: 2021-06-03 | End: 2021-06-03

## 2021-06-03 RX ORDER — TIMOLOL 0.5 %
1 DROPS OPHTHALMIC (EYE) DAILY
Refills: 0 | Status: DISCONTINUED | OUTPATIENT
Start: 2021-06-03 | End: 2021-06-07

## 2021-06-03 RX ORDER — NALOXONE HYDROCHLORIDE 4 MG/.1ML
0.1 SPRAY NASAL
Refills: 0 | Status: DISCONTINUED | OUTPATIENT
Start: 2021-06-03 | End: 2021-06-03

## 2021-06-03 RX ORDER — HYDROMORPHONE HYDROCHLORIDE 2 MG/ML
30 INJECTION INTRAMUSCULAR; INTRAVENOUS; SUBCUTANEOUS
Refills: 0 | Status: DISCONTINUED | OUTPATIENT
Start: 2021-06-03 | End: 2021-06-04

## 2021-06-03 RX ORDER — OFLOXACIN 0.3 %
1 DROPS OPHTHALMIC (EYE) EVERY 4 HOURS
Refills: 0 | Status: DISCONTINUED | OUTPATIENT
Start: 2021-06-04 | End: 2021-06-04

## 2021-06-03 RX ORDER — MESALAMINE 400 MG
1000 TABLET, DELAYED RELEASE (ENTERIC COATED) ORAL
Refills: 0 | Status: DISCONTINUED | OUTPATIENT
Start: 2021-06-03 | End: 2021-06-05

## 2021-06-03 RX ORDER — ATORVASTATIN CALCIUM 80 MG/1
20 TABLET, FILM COATED ORAL AT BEDTIME
Refills: 0 | Status: DISCONTINUED | OUTPATIENT
Start: 2021-06-03 | End: 2021-06-07

## 2021-06-03 RX ORDER — MONTELUKAST 4 MG/1
10 TABLET, CHEWABLE ORAL DAILY
Refills: 0 | Status: DISCONTINUED | OUTPATIENT
Start: 2021-06-03 | End: 2021-06-07

## 2021-06-03 RX ORDER — SENNA PLUS 8.6 MG/1
2 TABLET ORAL AT BEDTIME
Refills: 0 | Status: DISCONTINUED | OUTPATIENT
Start: 2021-06-03 | End: 2021-06-07

## 2021-06-03 RX ORDER — OFLOXACIN 0.3 %
1 DROPS OPHTHALMIC (EYE)
Refills: 0 | Status: COMPLETED | OUTPATIENT
Start: 2021-06-03 | End: 2021-06-03

## 2021-06-03 RX ORDER — ACETAMINOPHEN 500 MG
1000 TABLET ORAL ONCE
Refills: 0 | Status: COMPLETED | OUTPATIENT
Start: 2021-06-03 | End: 2021-06-03

## 2021-06-03 RX ORDER — HEPARIN SODIUM 5000 [USP'U]/ML
5000 INJECTION INTRAVENOUS; SUBCUTANEOUS ONCE
Refills: 0 | Status: COMPLETED | OUTPATIENT
Start: 2021-06-03 | End: 2021-06-03

## 2021-06-03 RX ORDER — FLUTICASONE PROPIONATE 50 MCG
1 SPRAY, SUSPENSION NASAL
Refills: 0 | Status: DISCONTINUED | OUTPATIENT
Start: 2021-06-03 | End: 2021-06-07

## 2021-06-03 RX ORDER — PSYLLIUM SEED (WITH DEXTROSE)
1 POWDER (GRAM) ORAL
Refills: 0 | Status: DISCONTINUED | OUTPATIENT
Start: 2021-06-03 | End: 2021-06-07

## 2021-06-03 RX ORDER — HYDROMORPHONE HYDROCHLORIDE 2 MG/ML
0.5 INJECTION INTRAMUSCULAR; INTRAVENOUS; SUBCUTANEOUS
Refills: 0 | Status: DISCONTINUED | OUTPATIENT
Start: 2021-06-03 | End: 2021-06-04

## 2021-06-03 RX ORDER — LORATADINE 10 MG/1
10 TABLET ORAL DAILY
Refills: 0 | Status: DISCONTINUED | OUTPATIENT
Start: 2021-06-03 | End: 2021-06-07

## 2021-06-03 RX ORDER — ACETAMINOPHEN 500 MG
975 TABLET ORAL ONCE
Refills: 0 | Status: COMPLETED | OUTPATIENT
Start: 2021-06-03 | End: 2021-06-03

## 2021-06-03 RX ORDER — NALOXONE HYDROCHLORIDE 4 MG/.1ML
0.1 SPRAY NASAL
Refills: 0 | Status: DISCONTINUED | OUTPATIENT
Start: 2021-06-03 | End: 2021-06-07

## 2021-06-03 RX ORDER — FAMOTIDINE 10 MG/ML
20 INJECTION INTRAVENOUS
Refills: 0 | Status: DISCONTINUED | OUTPATIENT
Start: 2021-06-03 | End: 2021-06-07

## 2021-06-03 RX ORDER — ACETAMINOPHEN 500 MG
1000 TABLET ORAL ONCE
Refills: 0 | Status: COMPLETED | OUTPATIENT
Start: 2021-06-04 | End: 2021-06-05

## 2021-06-03 RX ORDER — DIPHENHYDRAMINE HCL 50 MG
25 CAPSULE ORAL EVERY 4 HOURS
Refills: 0 | Status: DISCONTINUED | OUTPATIENT
Start: 2021-06-03 | End: 2021-06-03

## 2021-06-03 RX ORDER — CHOLECALCIFEROL (VITAMIN D3) 125 MCG
1000 CAPSULE ORAL DAILY
Refills: 0 | Status: DISCONTINUED | OUTPATIENT
Start: 2021-06-03 | End: 2021-06-07

## 2021-06-03 RX ORDER — KETOROLAC TROMETHAMINE 30 MG/ML
30 SYRINGE (ML) INJECTION EVERY 8 HOURS
Refills: 0 | Status: DISCONTINUED | OUTPATIENT
Start: 2021-06-03 | End: 2021-06-05

## 2021-06-03 RX ORDER — HYDROMORPHONE HYDROCHLORIDE 2 MG/ML
30 INJECTION INTRAMUSCULAR; INTRAVENOUS; SUBCUTANEOUS
Refills: 0 | Status: DISCONTINUED | OUTPATIENT
Start: 2021-06-03 | End: 2021-06-03

## 2021-06-03 RX ORDER — ONDANSETRON 8 MG/1
4 TABLET, FILM COATED ORAL EVERY 6 HOURS
Refills: 0 | Status: DISCONTINUED | OUTPATIENT
Start: 2021-06-03 | End: 2021-06-07

## 2021-06-03 RX ORDER — ONDANSETRON 8 MG/1
4 TABLET, FILM COATED ORAL EVERY 6 HOURS
Refills: 0 | Status: DISCONTINUED | OUTPATIENT
Start: 2021-06-03 | End: 2021-06-03

## 2021-06-03 RX ORDER — HEPARIN SODIUM 5000 [USP'U]/ML
5000 INJECTION INTRAVENOUS; SUBCUTANEOUS EVERY 8 HOURS
Refills: 0 | Status: DISCONTINUED | OUTPATIENT
Start: 2021-06-03 | End: 2021-06-07

## 2021-06-03 RX ORDER — HYDROMORPHONE HYDROCHLORIDE 2 MG/ML
0.5 INJECTION INTRAMUSCULAR; INTRAVENOUS; SUBCUTANEOUS
Refills: 0 | Status: DISCONTINUED | OUTPATIENT
Start: 2021-06-03 | End: 2021-06-03

## 2021-06-03 RX ORDER — MESALAMINE 400 MG
500 TABLET, DELAYED RELEASE (ENTERIC COATED) ORAL
Refills: 0 | Status: DISCONTINUED | OUTPATIENT
Start: 2021-06-03 | End: 2021-06-03

## 2021-06-03 RX ADMIN — FAMOTIDINE 20 MILLIGRAM(S): 10 INJECTION INTRAVENOUS at 17:55

## 2021-06-03 RX ADMIN — Medication 1 DROP(S): at 21:34

## 2021-06-03 RX ADMIN — Medication 1000 MILLIGRAM(S): at 15:15

## 2021-06-03 RX ADMIN — Medication 400 MILLIGRAM(S): at 14:45

## 2021-06-03 RX ADMIN — HEPARIN SODIUM 5000 UNIT(S): 5000 INJECTION INTRAVENOUS; SUBCUTANEOUS at 14:56

## 2021-06-03 RX ADMIN — SODIUM CHLORIDE 30 MILLILITER(S): 9 INJECTION, SOLUTION INTRAVENOUS at 11:29

## 2021-06-03 RX ADMIN — SENNA PLUS 2 TABLET(S): 8.6 TABLET ORAL at 21:36

## 2021-06-03 RX ADMIN — ONDANSETRON 4 MILLIGRAM(S): 8 TABLET, FILM COATED ORAL at 22:47

## 2021-06-03 RX ADMIN — ATORVASTATIN CALCIUM 20 MILLIGRAM(S): 80 TABLET, FILM COATED ORAL at 21:36

## 2021-06-03 RX ADMIN — HYDROMORPHONE HYDROCHLORIDE 30 MILLILITER(S): 2 INJECTION INTRAMUSCULAR; INTRAVENOUS; SUBCUTANEOUS at 11:54

## 2021-06-03 RX ADMIN — MONTELUKAST 10 MILLIGRAM(S): 4 TABLET, CHEWABLE ORAL at 23:02

## 2021-06-03 RX ADMIN — GABAPENTIN 100 MILLIGRAM(S): 400 CAPSULE ORAL at 06:14

## 2021-06-03 RX ADMIN — HEPARIN SODIUM 5000 UNIT(S): 5000 INJECTION INTRAVENOUS; SUBCUTANEOUS at 21:36

## 2021-06-03 RX ADMIN — HEPARIN SODIUM 5000 UNIT(S): 5000 INJECTION INTRAVENOUS; SUBCUTANEOUS at 07:24

## 2021-06-03 RX ADMIN — Medication 1 DROP(S): at 22:48

## 2021-06-03 RX ADMIN — HYDROMORPHONE HYDROCHLORIDE 30 MILLILITER(S): 2 INJECTION INTRAMUSCULAR; INTRAVENOUS; SUBCUTANEOUS at 19:34

## 2021-06-03 RX ADMIN — Medication 1 DROP(S): at 23:14

## 2021-06-03 RX ADMIN — Medication 30 MILLIGRAM(S): at 11:45

## 2021-06-03 RX ADMIN — ONDANSETRON 4 MILLIGRAM(S): 8 TABLET, FILM COATED ORAL at 17:00

## 2021-06-03 RX ADMIN — Medication 30 MILLIGRAM(S): at 11:30

## 2021-06-03 RX ADMIN — Medication 975 MILLIGRAM(S): at 06:14

## 2021-06-03 NOTE — BRIEF OPERATIVE NOTE - OPERATION/FINDINGS
76F former smoker h/o breast ca s/p lumpectomy and radiation '03 w/incidentally found .9cm mildly PET-avid RML lesion who is now s/p R VATS RML lobectomy w/MLND

## 2021-06-04 LAB
ANION GAP SERPL CALC-SCNC: 12 MMOL/L — SIGNIFICANT CHANGE UP (ref 7–14)
BUN SERPL-MCNC: 18 MG/DL — SIGNIFICANT CHANGE UP (ref 7–23)
CALCIUM SERPL-MCNC: 8.6 MG/DL — SIGNIFICANT CHANGE UP (ref 8.4–10.5)
CHLORIDE SERPL-SCNC: 103 MMOL/L — SIGNIFICANT CHANGE UP (ref 98–107)
CO2 SERPL-SCNC: 23 MMOL/L — SIGNIFICANT CHANGE UP (ref 22–31)
CREAT SERPL-MCNC: 0.67 MG/DL — SIGNIFICANT CHANGE UP (ref 0.5–1.3)
GLUCOSE SERPL-MCNC: 132 MG/DL — HIGH (ref 70–99)
HCT VFR BLD CALC: 37.1 % — SIGNIFICANT CHANGE UP (ref 34.5–45)
HGB BLD-MCNC: 12 G/DL — SIGNIFICANT CHANGE UP (ref 11.5–15.5)
MAGNESIUM SERPL-MCNC: 2.4 MG/DL — SIGNIFICANT CHANGE UP (ref 1.6–2.6)
MCHC RBC-ENTMCNC: 29.9 PG — SIGNIFICANT CHANGE UP (ref 27–34)
MCHC RBC-ENTMCNC: 32.3 GM/DL — SIGNIFICANT CHANGE UP (ref 32–36)
MCV RBC AUTO: 92.3 FL — SIGNIFICANT CHANGE UP (ref 80–100)
NRBC # BLD: 0 /100 WBCS — SIGNIFICANT CHANGE UP
NRBC # FLD: 0 K/UL — SIGNIFICANT CHANGE UP
PHOSPHATE SERPL-MCNC: 3.5 MG/DL — SIGNIFICANT CHANGE UP (ref 2.5–4.5)
PLATELET # BLD AUTO: 252 K/UL — SIGNIFICANT CHANGE UP (ref 150–400)
POTASSIUM SERPL-MCNC: 4.4 MMOL/L — SIGNIFICANT CHANGE UP (ref 3.5–5.3)
POTASSIUM SERPL-SCNC: 4.4 MMOL/L — SIGNIFICANT CHANGE UP (ref 3.5–5.3)
RBC # BLD: 4.02 M/UL — SIGNIFICANT CHANGE UP (ref 3.8–5.2)
RBC # FLD: 13.9 % — SIGNIFICANT CHANGE UP (ref 10.3–14.5)
RH IG SCN BLD-IMP: POSITIVE — SIGNIFICANT CHANGE UP
SODIUM SERPL-SCNC: 138 MMOL/L — SIGNIFICANT CHANGE UP (ref 135–145)
WBC # BLD: 13.27 K/UL — HIGH (ref 3.8–10.5)
WBC # FLD AUTO: 13.27 K/UL — HIGH (ref 3.8–10.5)

## 2021-06-04 PROCEDURE — 71045 X-RAY EXAM CHEST 1 VIEW: CPT | Mod: 26

## 2021-06-04 PROCEDURE — 99233 SBSQ HOSP IP/OBS HIGH 50: CPT

## 2021-06-04 RX ORDER — METOCLOPRAMIDE HCL 10 MG
10 TABLET ORAL ONCE
Refills: 0 | Status: COMPLETED | OUTPATIENT
Start: 2021-06-04 | End: 2021-06-04

## 2021-06-04 RX ORDER — LIDOCAINE 4 G/100G
1 CREAM TOPICAL EVERY 24 HOURS
Refills: 0 | Status: DISCONTINUED | OUTPATIENT
Start: 2021-06-04 | End: 2021-06-07

## 2021-06-04 RX ORDER — OFLOXACIN 0.3 %
1 DROPS OPHTHALMIC (EYE)
Refills: 0 | Status: COMPLETED | OUTPATIENT
Start: 2021-06-04 | End: 2021-06-06

## 2021-06-04 RX ORDER — ACETAMINOPHEN 500 MG
650 TABLET ORAL EVERY 6 HOURS
Refills: 0 | Status: COMPLETED | OUTPATIENT
Start: 2021-06-04 | End: 2021-06-06

## 2021-06-04 RX ORDER — SODIUM CHLORIDE 9 MG/ML
250 INJECTION, SOLUTION INTRAVENOUS ONCE
Refills: 0 | Status: COMPLETED | OUTPATIENT
Start: 2021-06-04 | End: 2021-06-04

## 2021-06-04 RX ORDER — TRAMADOL HYDROCHLORIDE 50 MG/1
25 TABLET ORAL EVERY 6 HOURS
Refills: 0 | Status: DISCONTINUED | OUTPATIENT
Start: 2021-06-04 | End: 2021-06-06

## 2021-06-04 RX ADMIN — HYDROMORPHONE HYDROCHLORIDE 30 MILLILITER(S): 2 INJECTION INTRAMUSCULAR; INTRAVENOUS; SUBCUTANEOUS at 07:22

## 2021-06-04 RX ADMIN — Medication 650 MILLIGRAM(S): at 09:48

## 2021-06-04 RX ADMIN — Medication 400 MILLIGRAM(S): at 11:51

## 2021-06-04 RX ADMIN — Medication 1 DROP(S): at 14:28

## 2021-06-04 RX ADMIN — Medication 1 DROP(S): at 15:24

## 2021-06-04 RX ADMIN — Medication 1 DROP(S): at 00:10

## 2021-06-04 RX ADMIN — Medication 30 MILLIGRAM(S): at 23:59

## 2021-06-04 RX ADMIN — Medication 1 DROP(S): at 21:00

## 2021-06-04 RX ADMIN — SENNA PLUS 2 TABLET(S): 8.6 TABLET ORAL at 21:00

## 2021-06-04 RX ADMIN — Medication 1 DROP(S): at 04:48

## 2021-06-04 RX ADMIN — Medication 1000 MILLIGRAM(S): at 13:10

## 2021-06-04 RX ADMIN — HEPARIN SODIUM 5000 UNIT(S): 5000 INJECTION INTRAVENOUS; SUBCUTANEOUS at 14:23

## 2021-06-04 RX ADMIN — LIDOCAINE 1 PATCH: 4 CREAM TOPICAL at 22:10

## 2021-06-04 RX ADMIN — HEPARIN SODIUM 5000 UNIT(S): 5000 INJECTION INTRAVENOUS; SUBCUTANEOUS at 21:01

## 2021-06-04 RX ADMIN — Medication 10 MILLIGRAM(S): at 10:45

## 2021-06-04 RX ADMIN — Medication 1 DROP(S): at 11:46

## 2021-06-04 RX ADMIN — SODIUM CHLORIDE 250 MILLILITER(S): 9 INJECTION, SOLUTION INTRAVENOUS at 10:45

## 2021-06-04 RX ADMIN — Medication 650 MILLIGRAM(S): at 10:19

## 2021-06-04 RX ADMIN — Medication 1000 MILLIGRAM(S): at 06:09

## 2021-06-04 RX ADMIN — Medication 1 DROP(S): at 00:58

## 2021-06-04 RX ADMIN — Medication 650 MILLIGRAM(S): at 21:01

## 2021-06-04 RX ADMIN — ONDANSETRON 4 MILLIGRAM(S): 8 TABLET, FILM COATED ORAL at 06:53

## 2021-06-04 RX ADMIN — SODIUM CHLORIDE 30 MILLILITER(S): 9 INJECTION, SOLUTION INTRAVENOUS at 18:04

## 2021-06-04 RX ADMIN — Medication 1000 MILLIGRAM(S): at 11:47

## 2021-06-04 RX ADMIN — HEPARIN SODIUM 5000 UNIT(S): 5000 INJECTION INTRAVENOUS; SUBCUTANEOUS at 06:09

## 2021-06-04 RX ADMIN — LIDOCAINE 1 PATCH: 4 CREAM TOPICAL at 20:15

## 2021-06-04 RX ADMIN — Medication 1 PACKET(S): at 06:09

## 2021-06-04 RX ADMIN — Medication 1000 UNIT(S): at 14:24

## 2021-06-04 RX ADMIN — LORATADINE 10 MILLIGRAM(S): 10 TABLET ORAL at 06:09

## 2021-06-04 RX ADMIN — Medication 1 DROP(S): at 07:33

## 2021-06-04 RX ADMIN — Medication 1000 MILLIGRAM(S): at 00:10

## 2021-06-04 RX ADMIN — FAMOTIDINE 20 MILLIGRAM(S): 10 INJECTION INTRAVENOUS at 06:08

## 2021-06-04 RX ADMIN — Medication 30 MILLIGRAM(S): at 15:29

## 2021-06-04 RX ADMIN — LIDOCAINE 1 PATCH: 4 CREAM TOPICAL at 10:16

## 2021-06-04 NOTE — INPATIENT CERTIFICATION FOR MEDICARE PATIENTS - PHYSICIAN CONCUR
I concur with the Admission Order and I certify that services are provided in accordance with Section 42 CFR § 412.3
Discharged

## 2021-06-05 ENCOUNTER — TRANSCRIPTION ENCOUNTER (OUTPATIENT)
Age: 76
End: 2021-06-05

## 2021-06-05 LAB
ANION GAP SERPL CALC-SCNC: 11 MMOL/L — SIGNIFICANT CHANGE UP (ref 7–14)
BUN SERPL-MCNC: 17 MG/DL — SIGNIFICANT CHANGE UP (ref 7–23)
CALCIUM SERPL-MCNC: 8.7 MG/DL — SIGNIFICANT CHANGE UP (ref 8.4–10.5)
CHLORIDE SERPL-SCNC: 104 MMOL/L — SIGNIFICANT CHANGE UP (ref 98–107)
CO2 SERPL-SCNC: 24 MMOL/L — SIGNIFICANT CHANGE UP (ref 22–31)
CREAT SERPL-MCNC: 0.76 MG/DL — SIGNIFICANT CHANGE UP (ref 0.5–1.3)
GLUCOSE SERPL-MCNC: 97 MG/DL — SIGNIFICANT CHANGE UP (ref 70–99)
HCT VFR BLD CALC: 35.9 % — SIGNIFICANT CHANGE UP (ref 34.5–45)
HGB BLD-MCNC: 11.4 G/DL — LOW (ref 11.5–15.5)
MAGNESIUM SERPL-MCNC: 2.4 MG/DL — SIGNIFICANT CHANGE UP (ref 1.6–2.6)
MCHC RBC-ENTMCNC: 28.9 PG — SIGNIFICANT CHANGE UP (ref 27–34)
MCHC RBC-ENTMCNC: 31.8 GM/DL — LOW (ref 32–36)
MCV RBC AUTO: 90.9 FL — SIGNIFICANT CHANGE UP (ref 80–100)
NRBC # BLD: 0 /100 WBCS — SIGNIFICANT CHANGE UP
NRBC # FLD: 0 K/UL — SIGNIFICANT CHANGE UP
PLATELET # BLD AUTO: 252 K/UL — SIGNIFICANT CHANGE UP (ref 150–400)
POTASSIUM SERPL-MCNC: 3.9 MMOL/L — SIGNIFICANT CHANGE UP (ref 3.5–5.3)
POTASSIUM SERPL-SCNC: 3.9 MMOL/L — SIGNIFICANT CHANGE UP (ref 3.5–5.3)
RBC # BLD: 3.95 M/UL — SIGNIFICANT CHANGE UP (ref 3.8–5.2)
RBC # FLD: 14 % — SIGNIFICANT CHANGE UP (ref 10.3–14.5)
SODIUM SERPL-SCNC: 139 MMOL/L — SIGNIFICANT CHANGE UP (ref 135–145)
WBC # BLD: 10.42 K/UL — SIGNIFICANT CHANGE UP (ref 3.8–10.5)
WBC # FLD AUTO: 10.42 K/UL — SIGNIFICANT CHANGE UP (ref 3.8–10.5)

## 2021-06-05 PROCEDURE — 71045 X-RAY EXAM CHEST 1 VIEW: CPT | Mod: 26

## 2021-06-05 RX ORDER — MESALAMINE 400 MG
1.5 TABLET, DELAYED RELEASE (ENTERIC COATED) ORAL ONCE
Refills: 0 | Status: COMPLETED | OUTPATIENT
Start: 2021-06-05 | End: 2021-06-05

## 2021-06-05 RX ORDER — KETOROLAC TROMETHAMINE 30 MG/ML
15 SYRINGE (ML) INJECTION ONCE
Refills: 0 | Status: DISCONTINUED | OUTPATIENT
Start: 2021-06-05 | End: 2021-06-05

## 2021-06-05 RX ADMIN — Medication 15 MILLIGRAM(S): at 17:25

## 2021-06-05 RX ADMIN — Medication 650 MILLIGRAM(S): at 23:30

## 2021-06-05 RX ADMIN — LIDOCAINE 1 PATCH: 4 CREAM TOPICAL at 19:00

## 2021-06-05 RX ADMIN — TRAMADOL HYDROCHLORIDE 25 MILLIGRAM(S): 50 TABLET ORAL at 15:01

## 2021-06-05 RX ADMIN — TRAMADOL HYDROCHLORIDE 25 MILLIGRAM(S): 50 TABLET ORAL at 14:08

## 2021-06-05 RX ADMIN — Medication 30 MILLIGRAM(S): at 10:00

## 2021-06-05 RX ADMIN — Medication 1000 MILLIGRAM(S): at 04:10

## 2021-06-05 RX ADMIN — FAMOTIDINE 20 MILLIGRAM(S): 10 INJECTION INTRAVENOUS at 17:09

## 2021-06-05 RX ADMIN — Medication 650 MILLIGRAM(S): at 11:59

## 2021-06-05 RX ADMIN — LIDOCAINE 1 PATCH: 4 CREAM TOPICAL at 11:58

## 2021-06-05 RX ADMIN — SENNA PLUS 2 TABLET(S): 8.6 TABLET ORAL at 21:00

## 2021-06-05 RX ADMIN — HEPARIN SODIUM 5000 UNIT(S): 5000 INJECTION INTRAVENOUS; SUBCUTANEOUS at 21:01

## 2021-06-05 RX ADMIN — Medication 1 DROP(S): at 05:44

## 2021-06-05 RX ADMIN — Medication 1000 UNIT(S): at 14:08

## 2021-06-05 RX ADMIN — Medication 30 MILLIGRAM(S): at 09:42

## 2021-06-05 RX ADMIN — TRAMADOL HYDROCHLORIDE 25 MILLIGRAM(S): 50 TABLET ORAL at 21:00

## 2021-06-05 RX ADMIN — HEPARIN SODIUM 5000 UNIT(S): 5000 INJECTION INTRAVENOUS; SUBCUTANEOUS at 14:08

## 2021-06-05 RX ADMIN — Medication 1 DROP(S): at 14:08

## 2021-06-05 RX ADMIN — Medication 30 MILLIGRAM(S): at 00:05

## 2021-06-05 RX ADMIN — Medication 15 MILLIGRAM(S): at 17:09

## 2021-06-05 RX ADMIN — Medication 1.5 GRAM(S): at 15:13

## 2021-06-05 RX ADMIN — HEPARIN SODIUM 5000 UNIT(S): 5000 INJECTION INTRAVENOUS; SUBCUTANEOUS at 05:44

## 2021-06-05 RX ADMIN — Medication 650 MILLIGRAM(S): at 18:02

## 2021-06-05 RX ADMIN — ATORVASTATIN CALCIUM 20 MILLIGRAM(S): 80 TABLET, FILM COATED ORAL at 05:43

## 2021-06-05 RX ADMIN — Medication 650 MILLIGRAM(S): at 17:09

## 2021-06-05 RX ADMIN — FAMOTIDINE 20 MILLIGRAM(S): 10 INJECTION INTRAVENOUS at 05:43

## 2021-06-05 RX ADMIN — TRAMADOL HYDROCHLORIDE 25 MILLIGRAM(S): 50 TABLET ORAL at 21:37

## 2021-06-05 RX ADMIN — Medication 400 MILLIGRAM(S): at 03:48

## 2021-06-05 RX ADMIN — LIDOCAINE 1 PATCH: 4 CREAM TOPICAL at 23:29

## 2021-06-06 LAB
COVID-19 SPIKE DOMAIN AB INTERP: POSITIVE
COVID-19 SPIKE DOMAIN ANTIBODY RESULT: >250 U/ML — HIGH
SARS-COV-2 IGG+IGM SERPL QL IA: >250 U/ML — HIGH
SARS-COV-2 IGG+IGM SERPL QL IA: POSITIVE

## 2021-06-06 PROCEDURE — 71045 X-RAY EXAM CHEST 1 VIEW: CPT | Mod: 26

## 2021-06-06 RX ORDER — POLYETHYLENE GLYCOL 3350 17 G/17G
17 POWDER, FOR SOLUTION ORAL DAILY
Refills: 0 | Status: DISCONTINUED | OUTPATIENT
Start: 2021-06-06 | End: 2021-06-07

## 2021-06-06 RX ORDER — KETOROLAC TROMETHAMINE 30 MG/ML
15 SYRINGE (ML) INJECTION ONCE
Refills: 0 | Status: DISCONTINUED | OUTPATIENT
Start: 2021-06-06 | End: 2021-06-06

## 2021-06-06 RX ORDER — OXYCODONE HYDROCHLORIDE 5 MG/1
10 TABLET ORAL EVERY 4 HOURS
Refills: 0 | Status: DISCONTINUED | OUTPATIENT
Start: 2021-06-06 | End: 2021-06-07

## 2021-06-06 RX ORDER — GABAPENTIN 400 MG/1
200 CAPSULE ORAL EVERY 8 HOURS
Refills: 0 | Status: DISCONTINUED | OUTPATIENT
Start: 2021-06-06 | End: 2021-06-07

## 2021-06-06 RX ORDER — OXYCODONE HYDROCHLORIDE 5 MG/1
5 TABLET ORAL EVERY 4 HOURS
Refills: 0 | Status: DISCONTINUED | OUTPATIENT
Start: 2021-06-06 | End: 2021-06-07

## 2021-06-06 RX ORDER — ONDANSETRON 8 MG/1
4 TABLET, FILM COATED ORAL ONCE
Refills: 0 | Status: COMPLETED | OUTPATIENT
Start: 2021-06-06 | End: 2021-06-06

## 2021-06-06 RX ADMIN — HEPARIN SODIUM 5000 UNIT(S): 5000 INJECTION INTRAVENOUS; SUBCUTANEOUS at 13:37

## 2021-06-06 RX ADMIN — GABAPENTIN 200 MILLIGRAM(S): 400 CAPSULE ORAL at 22:07

## 2021-06-06 RX ADMIN — LIDOCAINE 1 PATCH: 4 CREAM TOPICAL at 21:58

## 2021-06-06 RX ADMIN — Medication 650 MILLIGRAM(S): at 05:56

## 2021-06-06 RX ADMIN — Medication 1 DROP(S): at 13:37

## 2021-06-06 RX ADMIN — ONDANSETRON 4 MILLIGRAM(S): 8 TABLET, FILM COATED ORAL at 22:07

## 2021-06-06 RX ADMIN — FAMOTIDINE 20 MILLIGRAM(S): 10 INJECTION INTRAVENOUS at 17:36

## 2021-06-06 RX ADMIN — LIDOCAINE 1 PATCH: 4 CREAM TOPICAL at 21:57

## 2021-06-06 RX ADMIN — Medication 650 MILLIGRAM(S): at 05:26

## 2021-06-06 RX ADMIN — GABAPENTIN 200 MILLIGRAM(S): 400 CAPSULE ORAL at 13:37

## 2021-06-06 RX ADMIN — OXYCODONE HYDROCHLORIDE 5 MILLIGRAM(S): 5 TABLET ORAL at 22:07

## 2021-06-06 RX ADMIN — Medication 15 MILLIGRAM(S): at 09:15

## 2021-06-06 RX ADMIN — TRAMADOL HYDROCHLORIDE 25 MILLIGRAM(S): 50 TABLET ORAL at 05:26

## 2021-06-06 RX ADMIN — ATORVASTATIN CALCIUM 20 MILLIGRAM(S): 80 TABLET, FILM COATED ORAL at 05:26

## 2021-06-06 RX ADMIN — SENNA PLUS 2 TABLET(S): 8.6 TABLET ORAL at 22:06

## 2021-06-06 RX ADMIN — HEPARIN SODIUM 5000 UNIT(S): 5000 INJECTION INTRAVENOUS; SUBCUTANEOUS at 05:27

## 2021-06-06 RX ADMIN — LIDOCAINE 1 PATCH: 4 CREAM TOPICAL at 09:57

## 2021-06-06 RX ADMIN — OXYCODONE HYDROCHLORIDE 5 MILLIGRAM(S): 5 TABLET ORAL at 14:58

## 2021-06-06 RX ADMIN — OXYCODONE HYDROCHLORIDE 5 MILLIGRAM(S): 5 TABLET ORAL at 08:22

## 2021-06-06 RX ADMIN — ONDANSETRON 4 MILLIGRAM(S): 8 TABLET, FILM COATED ORAL at 15:00

## 2021-06-06 RX ADMIN — OXYCODONE HYDROCHLORIDE 5 MILLIGRAM(S): 5 TABLET ORAL at 15:28

## 2021-06-06 RX ADMIN — ONDANSETRON 4 MILLIGRAM(S): 8 TABLET, FILM COATED ORAL at 08:22

## 2021-06-06 RX ADMIN — HEPARIN SODIUM 5000 UNIT(S): 5000 INJECTION INTRAVENOUS; SUBCUTANEOUS at 22:07

## 2021-06-06 RX ADMIN — TRAMADOL HYDROCHLORIDE 25 MILLIGRAM(S): 50 TABLET ORAL at 05:56

## 2021-06-06 RX ADMIN — Medication 1000 UNIT(S): at 13:37

## 2021-06-06 RX ADMIN — OXYCODONE HYDROCHLORIDE 5 MILLIGRAM(S): 5 TABLET ORAL at 23:00

## 2021-06-06 RX ADMIN — FAMOTIDINE 20 MILLIGRAM(S): 10 INJECTION INTRAVENOUS at 05:26

## 2021-06-06 RX ADMIN — GABAPENTIN 200 MILLIGRAM(S): 400 CAPSULE ORAL at 08:22

## 2021-06-06 RX ADMIN — Medication 15 MILLIGRAM(S): at 08:22

## 2021-06-06 RX ADMIN — Medication 650 MILLIGRAM(S): at 00:00

## 2021-06-06 RX ADMIN — OXYCODONE HYDROCHLORIDE 5 MILLIGRAM(S): 5 TABLET ORAL at 09:15

## 2021-06-06 NOTE — DISCHARGE NOTE PROVIDER - NSDCMRMEDTOKEN_GEN_ALL_CORE_FT
Align: 1 tab(s) orally once a day  Allegra: 1 tab(s) orally once a day  Apriso 0.375 g oral capsule, extended release: 4 cap(s) orally once a day (in the morning)  azelastine 137 mcg/inh (0.1%) nasal spray: 2 spray(s) nasal once a day, As Needed  calcium: 1 tab(s) orally once a day  Canasa: rectal suppository once a week  CoQ10: 1 tab(s) orally once a day LD 5/25/2021  famotidine 20 mg oral tablet: 1 tab(s) orally 2 times a day  Flonase 50 mcg/inh nasal spray: 1 spray(s) nasal once a day, As Needed  Metamucil: 5 cap(s) orally once a day  Prolia 60 mg/mL subcutaneous solution: every 6 month  5/19/2021  rosuvastatin 5 mg oral tablet: 1 tab(s) orally every other day  Singulair 10 mg oral tablet: 1 tab(s) orally once a day  stool softener: 4 cap(s) orally once a day  timolol maleate 0.5% ophthalmic solution: 1 drop(s) to each affected eye once a day  turmeric: 1 tab(s) orally once a day LD 5/25/2021  Vitamin D3 1000 intl units (25 mcg) oral tablet: 1 tab(s) orally once a day   Align: 1 tab(s) orally once a day  Allegra: 1 tab(s) orally once a day  Apriso 0.375 g oral capsule, extended release: 4 cap(s) orally once a day (in the morning)  azelastine 137 mcg/inh (0.1%) nasal spray: 2 spray(s) nasal once a day, As Needed  calcium: 1 tab(s) orally once a day  Canasa: rectal suppository once a week  CoQ10: 1 tab(s) orally once a day  5/25/2021  famotidine 20 mg oral tablet: 1 tab(s) orally 2 times a day  Flonase 50 mcg/inh nasal spray: 1 spray(s) nasal once a day, As Needed  gabapentin 100 mg oral capsule: 2 cap(s) orally every 8 hours  Metamucil: 5 cap(s) orally once a day  ondansetron 4 mg oral disintegrating strip: 1 each orally 3 times a day   polyethylene glycol 3350 oral powder for reconstitution: 17 gram(s) orally once a day  Prolia 60 mg/mL subcutaneous solution: every 6 month  5/19/2021  rosuvastatin 5 mg oral tablet: 1 tab(s) orally every other day  senna oral tablet: 2 tab(s) orally once a day (at bedtime)  Singulair 10 mg oral tablet: 1 tab(s) orally once a day  stool softener: 4 cap(s) orally once a day  timolol maleate 0.5% ophthalmic solution: 1 drop(s) to each affected eye once a day  traMADol 50 mg oral tablet: 0.5 - 1 tab(s) orally every 6 hours MDD:4  turmeric: 1 tab(s) orally once a day  5/25/2021  Tylenol 325 mg oral tablet: 2 tab(s) orally every 4 hours as needed for mild pain control  Vitamin D3 1000 intl units (25 mcg) oral tablet: 1 tab(s) orally once a day

## 2021-06-06 NOTE — DISCHARGE NOTE PROVIDER - NSDCFUSCHEDAPPT_GEN_ALL_CORE_FT
MARIFER BENTLEY ; 06/22/2021 ; [facility] [address]  MARIFER BENTLEY ; 08/30/2021 ; [facility]

## 2021-06-06 NOTE — DISCHARGE NOTE PROVIDER - NSDCFUADDINST_GEN_ALL_CORE_FT
Keep the wound clean with soap and water and leave open to air.  Some drainage is normal but watch for pus, increasing redness, fevers, or difficulty breathing and if noted, call Dr Wilkins.

## 2021-06-06 NOTE — DISCHARGE NOTE PROVIDER - NSDCCPCAREPLAN_GEN_ALL_CORE_FT
PRINCIPAL DISCHARGE DIAGNOSIS  Diagnosis: Mass of right lung  Assessment and Plan of Treatment:

## 2021-06-06 NOTE — PROGRESS NOTE ADULT - SUBJECTIVE AND OBJECTIVE BOX
Subjective: pt admits discomfort at surgical site  She is upset that she is still in the hospital  questions and concerns discussed and addressed  chest tube with passive expiratory air leak when speaking      Vital Signs:  Vital Signs Last 24 Hrs  T(C): 36.4 (06-05-21 @ 05:30), Max: 37.8 (06-04-21 @ 16:00)  T(F): 97.5 (06-05-21 @ 05:30), Max: 100 (06-04-21 @ 16:00)  HR: 92 (06-05-21 @ 05:30) (72 - 94)  BP: 117/50 (06-05-21 @ 05:30) (95/43 - 168/119)  RR: 18 (06-05-21 @ 05:30) (10 - 23)  SpO2: 97% (06-05-21 @ 05:30) (93% - 99%) on (O2)    Telemetry/Alarms: sr  General: WN/WD NAD  Neurology: Awake, nonfocal, ROOT x 4  Eyes: Scleras clear, PERRLA/ EOMI, Gross vision intact  ENT:Gross hearing intact, grossly patent pharynx, no stridor  Neck: Neck supple, trachea midline, No JVD,   Respiratory: CTA B/L, No wheezing, rales, rhonchi  CV: RRR, S1S2, no murmurs, rubs or gallops  Abdominal: Soft, NT, ND +BS,   Extremities: No edema, + peripheral pulses  Skin: No Rashes, Hematoma, Ecchymosis  Lymphatic: No Neck, axilla, groin LAD  Psych: Oriented x 3, normal affect  Incisions: c,d,i  Tubes: right chest tube on waterseal drained 56cc/24h; passive expiratory air leak when speaking  Relevant labs, radiology and Medications reviewed                        11.4   10.42 )-----------( 252      ( 05 Jun 2021 07:27 )             35.9     06-05    139  |  104  |  17  ----------------------------<  97  3.9   |  24  |  0.76    Ca    8.7      05 Jun 2021 07:09  Phos  3.5     06-04  Mg     2.4     06-05        MEDICATIONS  (STANDING):  acetaminophen   Tablet .. 650 milliGRAM(s) Oral every 6 hours  atorvastatin 20 milliGRAM(s) Oral at bedtime  cholecalciferol 1000 Unit(s) Oral daily  famotidine    Tablet 20 milliGRAM(s) Oral two times a day  heparin   Injectable 5000 Unit(s) SubCutaneous every 8 hours  lidocaine   Patch 1 Patch Transdermal every 24 hours  loratadine 10 milliGRAM(s) Oral daily  mesalamine ER Capsule 1000 milliGRAM(s) Oral four times a day  ofloxacin 0.3% Solution 1 Drop(s) Both EYES four times a day  psyllium Powder 1 Packet(s) Oral two times a day  senna 2 Tablet(s) Oral at bedtime  timolol 0.5% Solution 1 Drop(s) Both EYES daily    MEDICATIONS  (PRN):  fluticasone propionate 50 MICROgram(s)/spray Nasal Spray 1 Spray(s) Both Nostrils two times a day PRN nasal congestion  ketorolac   Injectable 30 milliGRAM(s) IV Push every 8 hours PRN Moderate Pain (4 - 6)  montelukast 10 milliGRAM(s) Oral daily PRN shortness of breath / wheezing  naloxone Injectable 0.1 milliGRAM(s) IV Push every 3 minutes PRN For ANY of the following changes in patient status:  A. RR LESS THAN 10 breaths per minute, B. Oxygen saturation LESS THAN 90%, C. Sedation score of 6  ondansetron Injectable 4 milliGRAM(s) IV Push every 6 hours PRN Nausea  traMADol 25 milliGRAM(s) Oral every 6 hours PRN Moderate to Severe Pain (4 - 10)    Pertinent Physical Exam  I&O's Summary    04 Jun 2021 07:01 - 05 Jun 2021 07:00  --------------------------------------------------------  IN: 740 mL / OUT: 1436 mL / NET: -696 mL    05 Jun 2021 07:01 - 05 Jun 2021 08:54  --------------------------------------------------------  IN: 0 mL / OUT: 200 mL / NET: -200 mL        Assessment  77 y/o female, former smoker 2 PPD x3 years, stopped in 1960's.  Hx of breast cancer (R) , s/p lumpectomy and Rad tx 2003.  She is now s/p Right VATS, middle lobe wedge resection, possible right middle lobectomy, mediastinal lymph node dissection   (25 May 2021 15:36). Postop chest tube remains for expiratory air leak.       PLAN  Neuro: Pain management  Pulm: Encourage coughing, deep breathing and use of incentive spirometry. Wean off supplemental oxygen as able. Daily CXR.   Cardio: Monitor telemetry/alarms  GI: Tolerating diet. Continue stool softeners.  Renal: monitor urine output, supplement electrolytes as needed  Vasc: Heparin SC/SCDs for DVT prophylaxis  Heme: Stable H/H. .   ID: Off antibiotics. Stable.  Therapy: OOB/ambulate  Tubes: Monitor Chest tube output and air leak status  Disposition: Aim to D/C to home once air leak resolves and chest tube removed  Discussed with Cardiothoracic Team at AM rounds.  
Anesthesia Pain Management Service    SUBJECTIVE: Patient states the IV PCA helps with her pain.  RN noticed patient is very sleeping. IV PCA demand decreased to 0.15mg.  Patient states she feels very tired.  Patient does not want Oxycodone because it makes her nauseous.   Pain Scale Score	At rest: 4/10___ 	With Activity: ___ 	[X ] Refer to charted pain scores    THERAPY:    [ ] IV PCA Morphine		[ ] 5 mg/mL	[ ] 1 mg/mL  [X ] IV PCA Hydromorphone	[ ] 5 mg/mL	[X ] 1 mg/mL  [ ] IV PCA Fentanyl		[ ] 50 micrograms/mL    Demand dose __0.2_ lockout __6_ (minutes) Continuous Rate _0__ Total: _7.5__   mg used (in past 24 hrs)      MEDICATIONS  (STANDING):  acetaminophen   Tablet .. 650 milliGRAM(s) Oral every 6 hours  atorvastatin 20 milliGRAM(s) Oral at bedtime  cholecalciferol 1000 Unit(s) Oral daily  famotidine    Tablet 20 milliGRAM(s) Oral two times a day  heparin   Injectable 5000 Unit(s) SubCutaneous every 8 hours  lactated ringers. 1000 milliLiter(s) (30 mL/Hr) IV Continuous <Continuous>  loratadine 10 milliGRAM(s) Oral daily  mesalamine ER Capsule 1000 milliGRAM(s) Oral four times a day  ofloxacin 0.3% Solution 1 Drop(s) Both EYES every 4 hours  psyllium Powder 1 Packet(s) Oral two times a day  senna 2 Tablet(s) Oral at bedtime  timolol 0.5% Solution 1 Drop(s) Both EYES daily    MEDICATIONS  (PRN):  acetaminophen  IVPB .. 1000 milliGRAM(s) IV Intermittent once PRN Temp greater or equal to 38C (100.4F), Mild Pain (1 - 3)  acetaminophen  IVPB .. 1000 milliGRAM(s) IV Intermittent once PRN Temp greater or equal to 38C (100.4F), Mild Pain (1 - 3)  fluticasone propionate 50 MICROgram(s)/spray Nasal Spray 1 Spray(s) Both Nostrils two times a day PRN nasal congestion  ketorolac   Injectable 30 milliGRAM(s) IV Push every 8 hours PRN Moderate Pain (4 - 6)  montelukast 10 milliGRAM(s) Oral daily PRN shortness of breath / wheezing  naloxone Injectable 0.1 milliGRAM(s) IV Push every 3 minutes PRN For ANY of the following changes in patient status:  A. RR LESS THAN 10 breaths per minute, B. Oxygen saturation LESS THAN 90%, C. Sedation score of 6  ondansetron Injectable 4 milliGRAM(s) IV Push every 6 hours PRN Nausea  traMADol 25 milliGRAM(s) Oral every 6 hours PRN Moderate to Severe Pain (4 - 10)      OBJECTIVE:  Patient is sitting up in chair, appears tired.    Sedation Score:	[ X] Alert	[x ] Drowsy 	[ ] Arousable	[ ] Asleep	[ ] Unresponsive    Side Effects:	[X ] None	[ ] Nausea	[ ] Vomiting	[ ] Pruritus  		[ ] Other:    Vital Signs Last 24 Hrs  T(C): 36.1 (04 Jun 2021 08:00), Max: 36.8 (04 Jun 2021 04:00)  T(F): 97 (04 Jun 2021 08:00), Max: 98.2 (04 Jun 2021 04:00)  HR: 75 (04 Jun 2021 08:00) (71 - 90)  BP: 99/52 (04 Jun 2021 08:00) (95/53 - 126/61)  BP(mean): 64 (04 Jun 2021 08:00) (60 - 81)  RR: 12 (04 Jun 2021 08:00) (8 - 21)  SpO2: 98% (04 Jun 2021 08:00) (92% - 100%)    ASSESSMENT/ PLAN    Therapy to  be:	[ ] Continue   [ X] Discontinued   [X ] Change to prn Analgesics    Documentation and Verification of current medications:   [X] Done	[ ] Not done, not elligible    Comments:  Discussed patient with team, patient drowsy, IV Dilaudid PCA discontinued.  Patient used a considerable amount from yesterday. Recommend maximizing on non opioids and offered patient po Tramadol only as needed as a pain medication.  PRN Oral/IV opioids and/or Adjuvant non-opioid medication to be ordered at this point.    
MARIFER BENTLEY                     [id number]    HPI:  77 y/o female, former smoker 2 PPD x3 years, stopped in 1960's.  Hx of breast cancer (R) , s/p lumpectomy and Rad tx 2003.  Recent imaging was done, RML lung mass discovered.  Scheduled for Right VATS, middle lobe wedge resection, possible right middle lobectomy, mediastinal lymph node dissection   (25 May 2021 15:36)    Procedure:  R VATS RML lobectomy w/MLND 6/3/2021                         Issues:              Lung nodule              Postop pain  Chest tube in place  Urinary incontinence   GERD  Hx of breast cancer (R)                 Home Medications:  Align: 1 tab(s) orally once a day (03 Jun 2021 06:02)  Allegra: 1 tab(s) orally once a day (03 Jun 2021 06:02)  Apriso 0.375 g oral capsule, extended release: 4 cap(s) orally once a day (in the morning) (03 Jun 2021 06:02)  azelastine 137 mcg/inh (0.1%) nasal spray: 2 spray(s) nasal once a day, As Needed (03 Jun 2021 06:02)  calcium: 1 tab(s) orally once a day (03 Jun 2021 06:02)  Canasa: rectal suppository once a week (03 Jun 2021 06:02)  CoQ10: 1 tab(s) orally once a day LD 5/25/2021 (03 Jun 2021 06:02)  famotidine 20 mg oral tablet: 1 tab(s) orally 2 times a day (03 Jun 2021 06:02)  Flonase 50 mcg/inh nasal spray: 1 spray(s) nasal once a day, As Needed (03 Jun 2021 06:02)  Metamucil: 5 cap(s) orally once a day (03 Jun 2021 06:02)  Prolia 60 mg/mL subcutaneous solution: every 6 month LD 5/19/2021 (03 Jun 2021 06:02)  rosuvastatin 5 mg oral tablet: 1 tab(s) orally every other day (03 Jun 2021 06:02)  Singulair 10 mg oral tablet: 1 tab(s) orally once a day (03 Jun 2021 06:02)  stool softener: 4 cap(s) orally once a day (03 Jun 2021 06:02)  timolol maleate 0.5% ophthalmic solution: 1 drop(s) to each affected eye once a day (03 Jun 2021 06:02)  turmeric: 1 tab(s) orally once a day LD 5/25/2021 (03 Jun 2021 06:02)  Vitamin D3 1000 intl units (25 mcg) oral tablet: 1 tab(s) orally once a day (03 Jun 2021 06:02)        PAST MEDICAL & SURGICAL HISTORY:  Urinary incontinence in female    Constipation    GERD (gastroesophageal reflux disease)    Adhesion of intestine    Breast cancer  (R) 2003 lumpectomy    History of proctitis    Osteoporosis    Lung mass  (R)    Glaucoma    History of appendectomy    H/O total hysterectomy  1987    H/O lumpectomy  (R) breast 2003    Status post Mohs surgery  nose 2011    S/P appendectomy  peritonitis 1980    H/O ovarian cystectomy  (R) 1981    Status post phlebectomy  RLE 2003    H/O basal cell carcinoma excision  2015 groin              VITAL SIGNS:  Vital Signs Last 24 Hrs  T(C): 36.1 (04 Jun 2021 08:00), Max: 36.8 (04 Jun 2021 04:00)  T(F): 97 (04 Jun 2021 08:00), Max: 98.2 (04 Jun 2021 04:00)  HR: 75 (04 Jun 2021 08:00) (75 - 90)  BP: 99/52 (04 Jun 2021 08:00) (95/53 - 126/61)  BP(mean): 64 (04 Jun 2021 08:00) (60 - 81)  RR: 12 (04 Jun 2021 08:00) (8 - 21)  SpO2: 98% (04 Jun 2021 08:00) (92% - 100%)    I/Os:   I&O's Detail    03 Jun 2021 07:01  -  04 Jun 2021 07:00  --------------------------------------------------------  IN:    IV PiggyBack: 100 mL    Lactated Ringers: 600 mL    Oral Fluid: 340 mL  Total IN: 1040 mL    OUT:    Chest Tube (mL): 180 mL    Voided (mL): 1050 mL  Total OUT: 1230 mL    Total NET: -190 mL      04 Jun 2021 07:01  -  04 Jun 2021 11:22  --------------------------------------------------------  IN:    Lactated Ringers: 30 mL    Oral Fluid: 50 mL  Total IN: 80 mL    OUT:    Chest Tube (mL): 20 mL  Total OUT: 20 mL    Total NET: 60 mL          CAPILLARY BLOOD GLUCOSE          =======================MEDICATIONS===================  MEDICATIONS  (STANDING):  acetaminophen   Tablet .. 650 milliGRAM(s) Oral every 6 hours  atorvastatin 20 milliGRAM(s) Oral at bedtime  cholecalciferol 1000 Unit(s) Oral daily  famotidine    Tablet 20 milliGRAM(s) Oral two times a day  heparin   Injectable 5000 Unit(s) SubCutaneous every 8 hours  lactated ringers. 1000 milliLiter(s) (30 mL/Hr) IV Continuous <Continuous>  lidocaine   Patch 1 Patch Transdermal every 24 hours  loratadine 10 milliGRAM(s) Oral daily  mesalamine ER Capsule 1000 milliGRAM(s) Oral four times a day  ofloxacin 0.3% Solution 1 Drop(s) Both EYES every 4 hours  psyllium Powder 1 Packet(s) Oral two times a day  senna 2 Tablet(s) Oral at bedtime  timolol 0.5% Solution 1 Drop(s) Both EYES daily    MEDICATIONS  (PRN):  acetaminophen  IVPB .. 1000 milliGRAM(s) IV Intermittent once PRN Temp greater or equal to 38C (100.4F), Mild Pain (1 - 3)  acetaminophen  IVPB .. 1000 milliGRAM(s) IV Intermittent once PRN Temp greater or equal to 38C (100.4F), Mild Pain (1 - 3)  fluticasone propionate 50 MICROgram(s)/spray Nasal Spray 1 Spray(s) Both Nostrils two times a day PRN nasal congestion  ketorolac   Injectable 30 milliGRAM(s) IV Push every 8 hours PRN Moderate Pain (4 - 6)  montelukast 10 milliGRAM(s) Oral daily PRN shortness of breath / wheezing  naloxone Injectable 0.1 milliGRAM(s) IV Push every 3 minutes PRN For ANY of the following changes in patient status:  A. RR LESS THAN 10 breaths per minute, B. Oxygen saturation LESS THAN 90%, C. Sedation score of 6  ondansetron Injectable 4 milliGRAM(s) IV Push every 6 hours PRN Nausea  traMADol 25 milliGRAM(s) Oral every 6 hours PRN Moderate to Severe Pain (4 - 10)      PHYSICAL EXAM============================  General:                         Awake, alert, not in any distress  Neuro:                            Moving all extremities to commands.   Respiratory:	Air entry fair and  bilateral conducted sounds                                           Effort even and unlabored.  CV:		Regular rate and rhythm. Normal S1/S2                                          Distal pulses present.  Abdomen:	                     Soft, non-distended. Bowel sounds present   Skin:		No rash.  Extremities:	Warm, no cyanosis or edema.  Palpable pulses    ============================LABS=========================                        12.0   13.27 )-----------( 252      ( 04 Jun 2021 06:02 )             37.1     06-04    138  |  103  |  18  ----------------------------<  132<H>  4.4   |  23  |  0.67    Ca    8.6      04 Jun 2021 06:02  Phos  3.5     06-04  Mg     2.4     06-04    A/P:  76yFemale s/p R VATS RML lobectomy w/MLND 6/3/2021, experiencing  pain with deep breathing.                             Neuro:                                         Pain control with PCA /  Tylenol PRN                            Cardiovascular:                                          Continue hemodynamic monitoring.                            Respiratory:                                         Pt is on 2L  nasal canula, wean off as tolerated                                          Comfortable, not in any distress.                                         Encourage incentive spirometry                                          Monitor chest tube output                                         Chest tube to suction,                                                               Continue bronchodilators, pulmonary toilet                            GI                                         On  regular diet as tolerated                                         Continue Zofran / Reglan for nausea - PRN	                                                                 Renal:                                         Continue LR  30cc/hr                                         Monitor I/Os and electrolytes                                                                                        Hem/ Onc:                                                                                  Monitor chest tube output &  signs of bleeding.                                          Follow CBC in AM                           Infectious disease:                                            Monitor for fever / leukocytosis.                                          All surgical incision / chest tube  sites look clean                            Endocrine                                             Continue Accu-Checks with coverage    Pt is on SQ Heparin and Venodyne boots for DVT prophylaxis.     Pertinent clinical, laboratory, radiographic, hemodynamic, echocardiographic, respiratory data, microbiologic data and chart were reviewed and analyzed frequently throughout the course of the day and night  Patient seen, examined and plan discussed with CT Surgeon Dr. Wilkins / CTICU team during rounds.    Status discussed with patient /  updated plan of care                Alexandering Bro BABCOCK FACEP    
Anesthesia Pain Management Service- Attending Addendum    SUBJECTIVE: Patient's pain control adequate    Therapy:	  [ X] IV PCA	   [ ] Epidural           [ ] s/p Spinal Opoid              [ ] Postpartum infusion	  [ ] Patient controlled regional anesthesia (PCRA)    [ ] prn Analgesics    Allergies    enviornemtnal allergies: cough, sore throat (Other)  iodine (Rash)  penicillins (Rash)    Intolerances      MEDICATIONS  (STANDING):  acetaminophen   Tablet .. 650 milliGRAM(s) Oral every 6 hours  atorvastatin 20 milliGRAM(s) Oral at bedtime  cholecalciferol 1000 Unit(s) Oral daily  famotidine    Tablet 20 milliGRAM(s) Oral two times a day  heparin   Injectable 5000 Unit(s) SubCutaneous every 8 hours  lactated ringers. 1000 milliLiter(s) (30 mL/Hr) IV Continuous <Continuous>  lidocaine   Patch 1 Patch Transdermal every 24 hours  loratadine 10 milliGRAM(s) Oral daily  mesalamine ER Capsule 1000 milliGRAM(s) Oral four times a day  ofloxacin 0.3% Solution 1 Drop(s) Both EYES every 4 hours  psyllium Powder 1 Packet(s) Oral two times a day  senna 2 Tablet(s) Oral at bedtime  timolol 0.5% Solution 1 Drop(s) Both EYES daily    MEDICATIONS  (PRN):  acetaminophen  IVPB .. 1000 milliGRAM(s) IV Intermittent once PRN Temp greater or equal to 38C (100.4F), Mild Pain (1 - 3)  fluticasone propionate 50 MICROgram(s)/spray Nasal Spray 1 Spray(s) Both Nostrils two times a day PRN nasal congestion  ketorolac   Injectable 30 milliGRAM(s) IV Push every 8 hours PRN Moderate Pain (4 - 6)  montelukast 10 milliGRAM(s) Oral daily PRN shortness of breath / wheezing  naloxone Injectable 0.1 milliGRAM(s) IV Push every 3 minutes PRN For ANY of the following changes in patient status:  A. RR LESS THAN 10 breaths per minute, B. Oxygen saturation LESS THAN 90%, C. Sedation score of 6  ondansetron Injectable 4 milliGRAM(s) IV Push every 6 hours PRN Nausea  traMADol 25 milliGRAM(s) Oral every 6 hours PRN Moderate to Severe Pain (4 - 10)      OBJECTIVE:   [X] No new signs     [ ] Other:    Side Effects:  [X ] None			[ ] Other:      ASSESSMENT/PLAN  -Discontinue current therapy    [ ] Therapy changed to:    [ ] IV PCA       [ ] Epidural     [ X] prn Analgesics     Comments: Pain management per primary team, APS to sign off
ANESTHESIA POSTOP CHECK    76y Female POSTOP DAY 1 S/P R VATS    Vital Signs Last 24 Hrs  T(C): 37 (04 Jun 2021 12:00), Max: 37 (04 Jun 2021 12:00)  T(F): 98.6 (04 Jun 2021 12:00), Max: 98.6 (04 Jun 2021 12:00)  HR: 88 (04 Jun 2021 12:00) (72 - 90)  BP: 112/60 (04 Jun 2021 12:00) (95/53 - 121/52)  BP(mean): 72 (04 Jun 2021 12:00) (60 - 81)  RR: 18 (04 Jun 2021 12:00) (8 - 20)  SpO2: 96% (04 Jun 2021 12:00) (92% - 100%)  I&O's Summary    03 Jun 2021 07:01  -  04 Jun 2021 07:00  --------------------------------------------------------  IN: 1040 mL / OUT: 1230 mL / NET: -190 mL    04 Jun 2021 07:01  -  04 Jun 2021 13:48  --------------------------------------------------------  IN: 650 mL / OUT: 105 mL / NET: 545 mL        [X ] NO APPARENT ANESTHESIA COMPLICATIONS      Comments: 
MARIFER BENTLEY      76y   Female   [id number]         enviornemtnal allergies: cough, sore throat (Other)  iodine (Rash)  penicillins (Rash)             Daily Height in cm: 154.94 (03 Jun 2021 05:27)    Daily Drug Dosing Weight  Height (cm): 154.9 (03 Jun 2021 05:27)  Weight (kg): 57.2 (03 Jun 2021 05:27)  BMI (kg/m2): 23.8 (03 Jun 2021 05:27)  BSA (m2): 1.55 (03 Jun 2021 05:27)    HPI:  75 y/o female, former smoker 2 PPD x3 years, stopped in 1960's.  Hx of breast cancer (R) , s/p lumpectomy and Rad tx 2003.  Recent imaging was done, RML lung mass discovered.  Scheduled for Right VATS, middle lobe wedge resection, possible right middle lobectomy, mediastinal lymph node dissection   (25 May 2021 15:36)      Procedure:  R VATS RML lobectomy w/MLND 6/3/2021                         Issues:              Lung nodule              Postop pain  Chest tube in place  Urinary incontinence   GERD  Hx of breast cancer (R)                 Home Medications:  Align: 1 tab(s) orally once a day (03 Jun 2021 06:02)  Allegra: 1 tab(s) orally once a day (03 Jun 2021 06:02)  Apriso 0.375 g oral capsule, extended release: 4 cap(s) orally once a day (in the morning) (03 Jun 2021 06:02)  azelastine 137 mcg/inh (0.1%) nasal spray: 2 spray(s) nasal once a day, As Needed (03 Jun 2021 06:02)  calcium: 1 tab(s) orally once a day (03 Jun 2021 06:02)  Canasa: rectal suppository once a week (03 Jun 2021 06:02)  CoQ10: 1 tab(s) orally once a day LD 5/25/2021 (03 Jun 2021 06:02)  famotidine 20 mg oral tablet: 1 tab(s) orally 2 times a day (03 Jun 2021 06:02)  Flonase 50 mcg/inh nasal spray: 1 spray(s) nasal once a day, As Needed (03 Jun 2021 06:02)  Metamucil: 5 cap(s) orally once a day (03 Jun 2021 06:02)  Prolia 60 mg/mL subcutaneous solution: every 6 month LD 5/19/2021 (03 Jun 2021 06:02)  rosuvastatin 5 mg oral tablet: 1 tab(s) orally every other day (03 Jun 2021 06:02)  Singulair 10 mg oral tablet: 1 tab(s) orally once a day (03 Jun 2021 06:02)  stool softener: 4 cap(s) orally once a day (03 Jun 2021 06:02)  timolol maleate 0.5% ophthalmic solution: 1 drop(s) to each affected eye once a day (03 Jun 2021 06:02)  turmeric: 1 tab(s) orally once a day LD 5/25/2021 (03 Jun 2021 06:02)  Vitamin D3 1000 intl units (25 mcg) oral tablet: 1 tab(s) orally once a day (03 Jun 2021 06:02)      PAST MEDICAL & SURGICAL HISTORY:  Urinary incontinence in female    Constipation    GERD (gastroesophageal reflux disease)    Adhesion of intestine    Breast cancer  (R) 2003 lumpectomy    History of proctitis    Osteoporosis    Lung mass  (R)    Glaucoma    History of appendectomy    H/O total hysterectomy  1987    H/O lumpectomy  (R) breast 2003    Status post Mohs surgery  nose 2011    S/P appendectomy  peritonitis 1980    H/O ovarian cystectomy  (R) 1981    Status post phlebectomy  RLE 2003    H/O basal cell carcinoma excision  2015 groin      Vital Signs Last 24 Hrs  T(C): 36.7 (03 Jun 2021 12:00), Max: 36.7 (03 Jun 2021 12:00)  T(F): 98 (03 Jun 2021 12:00), Max: 98 (03 Jun 2021 12:00)  HR: 81 (03 Jun 2021 13:00) (71 - 81)  BP: 126/61 (03 Jun 2021 13:00) (104/56 - 142/68)  BP(mean): 75 (03 Jun 2021 13:00) (62 - 76)  RR: 18 (03 Jun 2021 13:00) (11 - 21)  SpO2: 100% (03 Jun 2021 13:00) (92% - 100%)  I&O's Detail    03 Jun 2021 07:01  -  03 Jun 2021 13:33  --------------------------------------------------------  IN:    Lactated Ringers: 90 mL  Total IN: 90 mL    OUT:    Chest Tube (mL): 20 mL  Total OUT: 20 mL    Total NET: 70 mL        CAPILLARY BLOOD GLUCOSE    Home Medications:  Align: 1 tab(s) orally once a day (03 Jun 2021 06:02)  Allegra: 1 tab(s) orally once a day (03 Jun 2021 06:02)  Apriso 0.375 g oral capsule, extended release: 4 cap(s) orally once a day (in the morning) (03 Jun 2021 06:02)  azelastine 137 mcg/inh (0.1%) nasal spray: 2 spray(s) nasal once a day, As Needed (03 Jun 2021 06:02)  calcium: 1 tab(s) orally once a day (03 Jun 2021 06:02)  Canasa: rectal suppository once a week (03 Jun 2021 06:02)  CoQ10: 1 tab(s) orally once a day LD 5/25/2021 (03 Jun 2021 06:02)  famotidine 20 mg oral tablet: 1 tab(s) orally 2 times a day (03 Jun 2021 06:02)  Flonase 50 mcg/inh nasal spray: 1 spray(s) nasal once a day, As Needed (03 Jun 2021 06:02)  Metamucil: 5 cap(s) orally once a day (03 Jun 2021 06:02)  Prolia 60 mg/mL subcutaneous solution: every 6 month LD 5/19/2021 (03 Jun 2021 06:02)  rosuvastatin 5 mg oral tablet: 1 tab(s) orally every other day (03 Jun 2021 06:02)  Singulair 10 mg oral tablet: 1 tab(s) orally once a day (03 Jun 2021 06:02)  stool softener: 4 cap(s) orally once a day (03 Jun 2021 06:02)  timolol maleate 0.5% ophthalmic solution: 1 drop(s) to each affected eye once a day (03 Jun 2021 06:02)  turmeric: 1 tab(s) orally once a day LD 5/25/2021 (03 Jun 2021 06:02)  Vitamin D3 1000 intl units (25 mcg) oral tablet: 1 tab(s) orally once a day (03 Jun 2021 06:02)      MEDICATIONS  (STANDING):  atorvastatin 20 milliGRAM(s) Oral at bedtime  cholecalciferol 1000 Unit(s) Oral daily  famotidine    Tablet 20 milliGRAM(s) Oral two times a day  heparin   Injectable 5000 Unit(s) SubCutaneous every 8 hours  HYDROmorphone PCA (1 mG/mL) 30 milliLiter(s) PCA Continuous PCA Continuous  lactated ringers. 1000 milliLiter(s) (30 mL/Hr) IV Continuous <Continuous>  loratadine 10 milliGRAM(s) Oral daily  mesalamine ER Capsule 500 milliGRAM(s) Oral with breakfast  psyllium Powder 1 Packet(s) Oral two times a day  senna 2 Tablet(s) Oral at bedtime  timolol 0.5% Solution 1 Drop(s) Both EYES daily    MEDICATIONS  (PRN):  fluticasone propionate 50 MICROgram(s)/spray Nasal Spray 1 Spray(s) Both Nostrils two times a day PRN nasal congestion  HYDROmorphone PCA (1 mG/mL) Rescue Clinician Bolus 0.5 milliGRAM(s) IV Push every 15 minutes PRN for Pain Scale GREATER THAN 6  ketorolac   Injectable 30 milliGRAM(s) IV Push every 8 hours PRN Moderate Pain (4 - 6)  montelukast 10 milliGRAM(s) Oral daily PRN shortness of breath / wheezing  naloxone Injectable 0.1 milliGRAM(s) IV Push every 3 minutes PRN For ANY of the following changes in patient status:  A. RR LESS THAN 10 breaths per minute, B. Oxygen saturation LESS THAN 90%, C. Sedation score of 6  ondansetron Injectable 4 milliGRAM(s) IV Push every 6 hours PRN Nausea          Physical exam:                             General:               Pt is awake, alert,  appears to be in pain but not in  distress                                                  Neuro:                  Nonfocal                             Cardiovascular:   S1 & S2, regular                           Respiratory:         Air entry is fair and equal on both sides, has bilateral conducted sounds                           GI:                          Soft, nondistended and nontender, Bowel sounds active                            Ext:                        No cyanosis or edema     Labs:                                                                   CXR:      Plan:    General: 76yFemale s/p R VATS RML lobectomy w/MLND 6/3/2021, experiencing  pain with deep breathing.                             Neuro:                                         Pain control with PCA /  Tylenol PRN                            Cardiovascular:                                          Continue hemodynamic monitoring.                            Respiratory:                                         Pt is on 2L  nasal canula, wean off as tolerated                                          Comfortable, not in any distress.                                         Encourage incentive spirometry                                          Monitor chest tube output                                         Chest tube to suction, HAS AIR LEAK                                                                 Continue bronchodilators, pulmonary toilet                            GI                                         On clear liquids, advance to  regular diet as tolerated                                         Continue Zofran / Reglan for nausea - PRN	                                                                 Renal:                                         Continue LR  30cc/hr                                         Monitor I/Os and electrolytes                                                                                        Hem/ Onc:                                                                                  Monitor chest tube output &  signs of bleeding.                                          Follow CBC in AM                           Infectious disease:                                            Monitor for fever / leukocytosis.                                          All surgical incision / chest tube  sites look clean                            Endocrine                                             Continue Accu-Checks with coverage    Pt is on SQ Heparin and Venodyne boots for DVT prophylaxis.     Pertinent clinical, laboratory, radiographic, hemodynamic, echocardiographic, respiratory data, microbiologic data and chart were reviewed and analyzed frequently throughout the course of the day and night  Patient seen, examined and plan discussed with CT Surgeon Dr. Wilkins / CTICU team during rounds.    Status discussed with patient /  updated plan of care            Mp Alegre MD                                                                    
   Subjective: "I'm having a lot of pain where my tube is, the meds I'm taking aren't working" Pt stating minimal relief from Tramadol. No CP or SOb. Using IS to [id number].     Vital Signs:  Vital Signs Last 24 Hrs  T(C): 36.6 (06-06-21 @ 08:05), Max: 36.8 (06-05-21 @ 20:57)  T(F): 97.8 (06-06-21 @ 08:05), Max: 98.2 (06-05-21 @ 20:57)  HR: 82 (06-06-21 @ 08:05) (80 - 87)  BP: 136/60 (06-06-21 @ 08:05) (118/44 - 157/59)  RR: 16 (06-06-21 @ 08:05) (16 - 18)  SpO2: 98% (06-06-21 @ 08:05) (95% - 100%) on (O2)    Telemetry/Alarms:  General: WN/WD NAD  Neurology: Awake, nonfocal, ROOT x 4  Eyes: Scleras clear, PERRLA/ EOMI, Gross vision intact  ENT:Gross hearing intact, grossly patent pharynx, no stridor  Neck: Neck supple, trachea midline, No JVD,   Respiratory: Dec BS to bilat LL  CV: RRR, S1S2, no murmurs, rubs or gallops  Abdominal: Soft, NT, ND +BS, no BM x 3 days, + flatus.   Extremities: No edema, + peripheral pulses  Skin: No Rashes, Hematoma, Ecchymosis  Lymphatic: No Neck, axilla, groin LAD  Psych: Oriented x 3, normal affect  Incisions: Rt VATs c/d/i.   Tubes: Rt. CT- 50cc/24hrs on WS, + expir AL  Relevant labs, radiology and Medications reviewed           CXR with small stable right apical ptx.              11.4   10.42 )-----------( 252      ( 05 Jun 2021 07:27 )             35.9     06-05    139  |  104  |  17  ----------------------------<  97  3.9   |  24  |  0.76    Ca    8.7      05 Jun 2021 07:09  Mg     2.4     06-05        MEDICATIONS  (STANDING):  atorvastatin 20 milliGRAM(s) Oral at bedtime  cholecalciferol 1000 Unit(s) Oral daily  famotidine    Tablet 20 milliGRAM(s) Oral two times a day  gabapentin 200 milliGRAM(s) Oral every 8 hours  heparin   Injectable 5000 Unit(s) SubCutaneous every 8 hours  lidocaine   Patch 1 Patch Transdermal every 24 hours  loratadine 10 milliGRAM(s) Oral daily  ofloxacin 0.3% Solution 1 Drop(s) Both EYES four times a day  polyethylene glycol 3350 17 Gram(s) Oral daily  psyllium Powder 1 Packet(s) Oral two times a day  senna 2 Tablet(s) Oral at bedtime  timolol 0.5% Solution 1 Drop(s) Both EYES daily    MEDICATIONS  (PRN):  bisacodyl Suppository 10 milliGRAM(s) Rectal daily PRN Constipation  fluticasone propionate 50 MICROgram(s)/spray Nasal Spray 1 Spray(s) Both Nostrils two times a day PRN nasal congestion  montelukast 10 milliGRAM(s) Oral daily PRN shortness of breath / wheezing  naloxone Injectable 0.1 milliGRAM(s) IV Push every 3 minutes PRN For ANY of the following changes in patient status:  A. RR LESS THAN 10 breaths per minute, B. Oxygen saturation LESS THAN 90%, C. Sedation score of 6  ondansetron Injectable 4 milliGRAM(s) IV Push every 6 hours PRN Nausea  oxyCODONE    IR 5 milliGRAM(s) Oral every 4 hours PRN Moderate Pain (4 - 6)  oxyCODONE    IR 10 milliGRAM(s) Oral every 4 hours PRN Severe Pain (7 - 10)    Pertinent Physical Exam  I&O's Summary    05 Jun 2021 07:01  -  06 Jun 2021 07:00  --------------------------------------------------------  IN: 0 mL / OUT: 1400 mL / NET: -1400 mL          Assessment  76y Female  w/ PAST MEDICAL & SURGICAL HISTORY:  Urinary incontinence in female    Constipation    GERD (gastroesophageal reflux disease)    Adhesion of intestine    Breast cancer  (R) 2003 lumpectomy    History of proctitis    Osteoporosis    Lung mass  (R)    Glaucoma    History of appendectomy    H/O total hysterectomy  1987    H/O lumpectomy  (R) breast 2003    Status post Mohs surgery  nose 2011    S/P appendectomy  peritonitis 1980    H/O ovarian cystectomy  (R) 1981    Status post phlebectomy  RLE 2003    H/O basal cell carcinoma excision  2015 groin    admitted with complaints of Patient is a 76y old  Female who presents with a chief complaint of Right lung mass (06 Jun 2021 03:20)  77yo F s/p Rt. VATS RML lobectomy on 6/3/21. Post op with small air leak and ptx seen on CXR. Pain management issues.     PLAN  Neuro: Pain management. Will change pain regiment.   Pulm: Encourage coughing, deep breathing and use of incentive spirometry. Wean off supplemental oxygen as able. Daily CXR.   Cardio: Monitor telemetry/alarms  GI: Tolerating diet. Continue stool softeners. Will add miralax and dulcolax supp  Renal: monitor urine output, supplement electrolytes as needed  Vasc: Heparin SC/SCDs for DVT prophylaxis  Heme: Stable H/H. .   ID: Off antibiotics. Stable.  Therapy: OOB/ambulate  Tubes: Monitor Chest tube output and air leak. Continue on water seal.   Disposition: Aim to D/C to home once CT removed.   Discussed with Cardiothoracic Team at AM rounds.

## 2021-06-06 NOTE — DISCHARGE NOTE PROVIDER - CARE PROVIDER_API CALL
Vernon Wilkins)  Thoracic Surgery  [phone] [place] [address]  Phone: [phone]  Fax: [phone]  Established Patient  Follow Up Time: 1 week

## 2021-06-06 NOTE — DISCHARGE NOTE PROVIDER - HOSPITAL COURSE
75 y/o female, former smoker with a h/o of right-sided breast cancer for which she underwent a lumpectomy and had Rad tx in 2003.  Recent imaging showed a RML lung mass so she underwent a Right VATS, right middle lobectomy, and mediastinal lymph node dissection on 6/3/21.  She had a post-op air leak and  when that resolved, her chesttube was removed and she was discharged home. 77 y/o female, former smoker with a h/o of right-sided breast cancer for which she underwent a lumpectomy and had Rad tx in 2003.  Recent imaging showed a RML lung mass so she underwent a Right VATS, right middle lobectomy, and mediastinal lymph node dissection on 6/3/21.  She had a post-op air leak and  when that resolved, her chest tube was removed and she was discharged home.

## 2021-06-07 ENCOUNTER — TRANSCRIPTION ENCOUNTER (OUTPATIENT)
Age: 76
End: 2021-06-07

## 2021-06-07 VITALS
SYSTOLIC BLOOD PRESSURE: 115 MMHG | OXYGEN SATURATION: 98 % | TEMPERATURE: 98 F | HEART RATE: 83 BPM | RESPIRATION RATE: 16 BRPM | DIASTOLIC BLOOD PRESSURE: 49 MMHG

## 2021-06-07 PROCEDURE — 71045 X-RAY EXAM CHEST 1 VIEW: CPT | Mod: 26,76

## 2021-06-07 RX ORDER — ACETAMINOPHEN 500 MG
650 TABLET ORAL EVERY 6 HOURS
Refills: 0 | Status: DISCONTINUED | OUTPATIENT
Start: 2021-06-07 | End: 2021-06-07

## 2021-06-07 RX ORDER — SENNA PLUS 8.6 MG/1
2 TABLET ORAL
Qty: 0 | Refills: 0 | DISCHARGE
Start: 2021-06-07

## 2021-06-07 RX ORDER — POLYETHYLENE GLYCOL 3350 17 G/17G
17 POWDER, FOR SOLUTION ORAL
Qty: 0 | Refills: 0 | DISCHARGE
Start: 2021-06-07

## 2021-06-07 RX ORDER — GABAPENTIN 400 MG/1
2 CAPSULE ORAL
Qty: 30 | Refills: 0
Start: 2021-06-07 | End: 2021-06-11

## 2021-06-07 RX ORDER — ONDANSETRON 8 MG/1
1 TABLET, FILM COATED ORAL
Qty: 9 | Refills: 0
Start: 2021-06-07 | End: 2021-06-09

## 2021-06-07 RX ORDER — TRAMADOL HYDROCHLORIDE 50 MG/1
1 TABLET ORAL
Qty: 12 | Refills: 0
Start: 2021-06-07 | End: 2021-06-09

## 2021-06-07 RX ADMIN — OXYCODONE HYDROCHLORIDE 5 MILLIGRAM(S): 5 TABLET ORAL at 02:52

## 2021-06-07 RX ADMIN — FAMOTIDINE 20 MILLIGRAM(S): 10 INJECTION INTRAVENOUS at 06:16

## 2021-06-07 RX ADMIN — ATORVASTATIN CALCIUM 20 MILLIGRAM(S): 80 TABLET, FILM COATED ORAL at 06:16

## 2021-06-07 RX ADMIN — Medication 650 MILLIGRAM(S): at 11:58

## 2021-06-07 RX ADMIN — GABAPENTIN 200 MILLIGRAM(S): 400 CAPSULE ORAL at 06:16

## 2021-06-07 RX ADMIN — HEPARIN SODIUM 5000 UNIT(S): 5000 INJECTION INTRAVENOUS; SUBCUTANEOUS at 06:15

## 2021-06-07 RX ADMIN — LIDOCAINE 1 PATCH: 4 CREAM TOPICAL at 11:57

## 2021-06-07 NOTE — DISCHARGE NOTE NURSING/CASE MANAGEMENT/SOCIAL WORK - PATIENT PORTAL LINK FT
You can access the FollowMyHealth Patient Portal offered by [facility] by registering at the following website: [email or web address]. By joining [facility]’s FollowMyHealth portal, you will also be able to view your health information using other applications (apps) compatible with our system.

## 2021-06-09 LAB — SURGICAL PATHOLOGY STUDY: SIGNIFICANT CHANGE UP

## 2021-06-10 ENCOUNTER — NON-APPOINTMENT (OUTPATIENT)
Age: 76
End: 2021-06-10

## 2021-06-10 PROBLEM — Z87.19 PERSONAL HISTORY OF OTHER DISEASES OF THE DIGESTIVE SYSTEM: Chronic | Status: ACTIVE | Noted: 2021-05-25

## 2021-06-10 PROBLEM — C50.919 MALIGNANT NEOPLASM OF UNSPECIFIED SITE OF UNSPECIFIED FEMALE BREAST: Chronic | Status: ACTIVE | Noted: 2021-05-25

## 2021-06-10 PROBLEM — H40.9 UNSPECIFIED GLAUCOMA: Chronic | Status: ACTIVE | Noted: 2021-05-25

## 2021-06-10 PROBLEM — M81.0 AGE-RELATED OSTEOPOROSIS WITHOUT CURRENT PATHOLOGICAL FRACTURE: Chronic | Status: ACTIVE | Noted: 2021-05-25

## 2021-06-16 ENCOUNTER — APPOINTMENT (OUTPATIENT)
Dept: THORACIC SURGERY | Facility: CLINIC | Age: 76
End: 2021-06-16
Payer: MEDICARE

## 2021-06-16 VITALS
SYSTOLIC BLOOD PRESSURE: 132 MMHG | WEIGHT: 121 LBS | HEART RATE: 87 BPM | TEMPERATURE: 98.1 F | OXYGEN SATURATION: 98 % | RESPIRATION RATE: 18 BRPM | DIASTOLIC BLOOD PRESSURE: 78 MMHG | HEIGHT: 61.5 IN | BODY MASS INDEX: 22.55 KG/M2

## 2021-06-16 PROCEDURE — 99024 POSTOP FOLLOW-UP VISIT: CPT

## 2021-06-22 ENCOUNTER — NON-APPOINTMENT (OUTPATIENT)
Age: 76
End: 2021-06-22

## 2021-06-25 ENCOUNTER — APPOINTMENT (OUTPATIENT)
Dept: PHYSICAL MEDICINE AND REHAB | Facility: CLINIC | Age: 76
End: 2021-06-25
Payer: MEDICARE

## 2021-06-25 VITALS
TEMPERATURE: 97.6 F | OXYGEN SATURATION: 100 % | BODY MASS INDEX: 23.22 KG/M2 | WEIGHT: 123 LBS | SYSTOLIC BLOOD PRESSURE: 126 MMHG | HEIGHT: 61 IN | HEART RATE: 72 BPM | DIASTOLIC BLOOD PRESSURE: 75 MMHG

## 2021-06-25 DIAGNOSIS — G89.18 OTHER ACUTE POSTPROCEDURAL PAIN: ICD-10-CM

## 2021-06-25 PROCEDURE — 99205 OFFICE O/P NEW HI 60 MIN: CPT

## 2021-06-25 NOTE — HISTORY OF PRESENT ILLNESS
[FreeTextEntry1] : 77 yo F pmh breast ca dx 2003 s/p lumpectomy and radiation, found to have lung lesion and is s/p Bronchoscopy, right video thoracoscopy, right middle lobectomy, and mediastinal lymph node sampling on 6/3/21. Endorsing persistent Right anterior chest pain radiating to shoulder unrelieved by Tramadol, Gabapentin, Tylenol, and Ibuprofen. \par \par Pain is 8/10 today.  She takes tylenol prn (took 2 this morning).  She takes gabapentin 200mg 2-3 times a day.  Pain is worse with sitting or standing for long periods.    She takes tramadol prn, when she takes it pain improves to 5 or 6.  \par \par She has difficulty with lifting, pulling and reaching with the R arm. Patient is R handed. She also has difficulty texting. \par \par She ambulates without device, was previously very active but is now limited by pain. \par \par States she is starting to sleep better.

## 2021-06-25 NOTE — PHYSICAL EXAM
[FreeTextEntry1] : GEN: AAOx3, NAD.\par PSYCH: Normal mood and affect. Responds appropriately to commands.\par EYES: Sclerae Anicteric. No discharge. EOMI.\par RESP: Breathing unlabored.\par CV: Radial pulses 2+ and equal. No varicosities noted.\par EXT: No C/C/E.\par SKIN: + healed surgical incisions\par LYMPH: No supraclavicular, anterior or posterior cervical lymphadenopathy appreciated.\par GAIT: Non antalgic, normal reciprocating heel to toe.\par INSP: No Atrophy, Bony Deformity, Swelling. Shoulder height symmetric. Normal Thoracic Kyphosis.\par Cervical ROM: Full and pain free.\par Shoulder AROM: mild limitation R shoulder abduction  \par PALP: + scapular tightness, tenderness.   mild numbness around incision site, mild ttp\par STRENGTH: 5/5 bilateral shoulder abductors, internal rotation, external rotation, elbow flexors, elbow extensors, wrist extensors, long finger flexors, hand intrinsics, .\par TONE: Normal, No clonus.\par SENSATION:  Normal to light touch in the bilateral upper extremities. \par \par

## 2021-06-25 NOTE — ASSESSMENT
[FreeTextEntry1] : 77 yo f s/p thoracotomy for resection of lung nodule, with R scapular pain radiating to R deltoid, also with neuropathic pain symptoms\par -continue gabapentin (takes 200mg BID)\par -continue tramadol prn (patient does not wish to take oxycodone)\par -start cancer rehab therapy program for scapular dysfunction, myofascial pain\par -start flexeril for muscle spasm prn,  after trying flexeril if not having side effects can increase gabapentin to 200mg TID\par -follow up in 2 weeks

## 2021-06-30 ENCOUNTER — OUTPATIENT (OUTPATIENT)
Dept: OUTPATIENT SERVICES | Facility: HOSPITAL | Age: 76
LOS: 1 days | End: 2021-06-30
Payer: MEDICARE

## 2021-06-30 ENCOUNTER — APPOINTMENT (OUTPATIENT)
Dept: THORACIC SURGERY | Facility: CLINIC | Age: 76
End: 2021-06-30
Payer: MEDICARE

## 2021-06-30 ENCOUNTER — APPOINTMENT (OUTPATIENT)
Dept: RADIOLOGY | Facility: HOSPITAL | Age: 76
End: 2021-06-30

## 2021-06-30 VITALS
SYSTOLIC BLOOD PRESSURE: 131 MMHG | DIASTOLIC BLOOD PRESSURE: 79 MMHG | HEART RATE: 90 BPM | OXYGEN SATURATION: 97 % | HEIGHT: 63 IN | RESPIRATION RATE: 18 BRPM | BODY MASS INDEX: 21.79 KG/M2 | TEMPERATURE: 98 F | WEIGHT: 123 LBS

## 2021-06-30 DIAGNOSIS — Z98.890 OTHER SPECIFIED POSTPROCEDURAL STATES: Chronic | ICD-10-CM

## 2021-06-30 DIAGNOSIS — C80.1 MALIGNANT (PRIMARY) NEOPLASM, UNSPECIFIED: ICD-10-CM

## 2021-06-30 DIAGNOSIS — Z98.89 OTHER SPECIFIED POSTPROCEDURAL STATES: Chronic | ICD-10-CM

## 2021-06-30 DIAGNOSIS — Z23 ENCOUNTER FOR IMMUNIZATION: ICD-10-CM

## 2021-06-30 DIAGNOSIS — Z90.710 ACQUIRED ABSENCE OF BOTH CERVIX AND UTERUS: Chronic | ICD-10-CM

## 2021-06-30 DIAGNOSIS — Z90.49 ACQUIRED ABSENCE OF OTHER SPECIFIED PARTS OF DIGESTIVE TRACT: Chronic | ICD-10-CM

## 2021-06-30 DIAGNOSIS — R05 COUGH: ICD-10-CM

## 2021-06-30 PROCEDURE — 99024 POSTOP FOLLOW-UP VISIT: CPT

## 2021-06-30 PROCEDURE — 71046 X-RAY EXAM CHEST 2 VIEWS: CPT | Mod: 26

## 2021-07-07 ENCOUNTER — APPOINTMENT (OUTPATIENT)
Dept: OBGYN | Facility: CLINIC | Age: 76
End: 2021-07-07
Payer: MEDICARE

## 2021-07-07 VITALS
WEIGHT: 124 LBS | SYSTOLIC BLOOD PRESSURE: 122 MMHG | BODY MASS INDEX: 23.41 KG/M2 | HEIGHT: 61 IN | DIASTOLIC BLOOD PRESSURE: 80 MMHG

## 2021-07-07 DIAGNOSIS — R31.29 OTHER MICROSCOPIC HEMATURIA: ICD-10-CM

## 2021-07-07 LAB
BILIRUB UR QL STRIP: NORMAL
CLARITY UR: CLEAR
COLLECTION METHOD: NORMAL
GLUCOSE UR-MCNC: NORMAL
HCG UR QL: 0.2 EU/DL
HGB UR QL STRIP.AUTO: NORMAL
KETONES UR-MCNC: NORMAL
LEUKOCYTE ESTERASE UR QL STRIP: NORMAL
NITRITE UR QL STRIP: NORMAL
PH UR STRIP: 6
PROT UR STRIP-MCNC: NORMAL
SP GR UR STRIP: 1.01

## 2021-07-07 PROCEDURE — G0328 FECAL BLOOD SCRN IMMUNOASSAY: CPT | Mod: QW

## 2021-07-07 PROCEDURE — 81003 URINALYSIS AUTO W/O SCOPE: CPT | Mod: QW

## 2021-07-07 PROCEDURE — G0101: CPT | Mod: GA

## 2021-07-12 ENCOUNTER — APPOINTMENT (OUTPATIENT)
Dept: PHYSICAL MEDICINE AND REHAB | Facility: CLINIC | Age: 76
End: 2021-07-12
Payer: MEDICARE

## 2021-07-12 VITALS
DIASTOLIC BLOOD PRESSURE: 71 MMHG | TEMPERATURE: 96.08 F | SYSTOLIC BLOOD PRESSURE: 115 MMHG | OXYGEN SATURATION: 100 % | HEART RATE: 81 BPM

## 2021-07-12 VITALS — SYSTOLIC BLOOD PRESSURE: 115 MMHG | DIASTOLIC BLOOD PRESSURE: 71 MMHG | OXYGEN SATURATION: 100 % | HEART RATE: 81 BPM

## 2021-07-12 PROCEDURE — 99214 OFFICE O/P EST MOD 30 MIN: CPT

## 2021-07-12 NOTE — ASSESSMENT
[FreeTextEntry1] : 75 yo f s/p thoracotomy for resection of lung nodule, with posterior deltoid burning pain radiating across b/l ribs, likely neuropathic pain \par -continue gabapentin 300mg, increase to 1 in am, 1 in afternoon and 2 at bedtime\par -continue cancer rehab therapy program for scapular dysfunction, myofascial pain\par -check cmp to monitor lfts, kidney function- labs ordered however later patient called with recent labs from [place] [facility] which had normal LFTs.  \par -follow up in 4-6 weeks.

## 2021-07-12 NOTE — HISTORY OF PRESENT ILLNESS
[FreeTextEntry1] : 75 yo F pmh breast ca dx 2003 s/p lumpectomy and radiation, found to have lung lesion and is s/p Bronchoscopy, right video thoracoscopy, right middle lobectomy, and mediastinal lymph node sampling on 6/3/21. Endorsing persistent Right anterior chest pain radiating to RUE.  She is now off the tramadol and takes 2 extra strength tylenol twice a day.  She now takes gabapentin 300mg 3x/day.  \par \par Pain is 2/10 today however was more severe a few days ago. \par \par She reports burning pain in R posterior arm (triceps), with radiation b/l rib cage with tenderness to palpation.  Denies pain with deep breathing. \par She has difficulty with lifting, pulling and reaching with the R arm. She states she feels more pain with these motions but is able to do more compared to last visit.  Patient is R handed.\par She ambulates without device.

## 2021-07-12 NOTE — PHYSICAL EXAM
[FreeTextEntry1] : GEN: AAOx3, NAD.\par PSYCH: Normal mood and affect. Responds appropriately to commands.\par EYES: Sclerae Anicteric. No discharge. EOMI.\par RESP: Breathing unlabored.\par CV: Radial pulses 2+ and equal. No varicosities noted.\par EXT: No C/C/E.\par SKIN: + healed surgical incisions\par LYMPH: No supraclavicular, anterior or posterior cervical lymphadenopathy appreciated.\par GAIT: Non antalgic, normal reciprocating heel to toe.\par INSP: No Atrophy, Bony Deformity, Swelling. Shoulder height symmetric. Normal Thoracic Kyphosis.\par Cervical ROM: Full and pain free.\par Shoulder AROM:  R shoulder abduction limited 160 degrees\par PALP: +mild scapular tightness, tenderness. mild numbness around incision site, mild ttp (improved from last visit)\par STRENGTH: 5/5 bilateral shoulder abductors, internal rotation, external rotation, elbow flexors, elbow extensors, wrist extensors, long finger flexors, hand intrinsics, .\par TONE: Normal, No clonus.\par SENSATION: Normal to light touch in the bilateral upper extremities

## 2021-07-29 ENCOUNTER — APPOINTMENT (OUTPATIENT)
Dept: CT IMAGING | Facility: IMAGING CENTER | Age: 76
End: 2021-07-29
Payer: MEDICARE

## 2021-07-29 ENCOUNTER — OUTPATIENT (OUTPATIENT)
Dept: OUTPATIENT SERVICES | Facility: HOSPITAL | Age: 76
LOS: 1 days | End: 2021-07-29
Payer: MEDICARE

## 2021-07-29 DIAGNOSIS — Z00.8 ENCOUNTER FOR OTHER GENERAL EXAMINATION: ICD-10-CM

## 2021-07-29 DIAGNOSIS — Z98.89 OTHER SPECIFIED POSTPROCEDURAL STATES: Chronic | ICD-10-CM

## 2021-07-29 DIAGNOSIS — Z98.890 OTHER SPECIFIED POSTPROCEDURAL STATES: Chronic | ICD-10-CM

## 2021-07-29 DIAGNOSIS — Z90.710 ACQUIRED ABSENCE OF BOTH CERVIX AND UTERUS: Chronic | ICD-10-CM

## 2021-07-29 DIAGNOSIS — Z90.49 ACQUIRED ABSENCE OF OTHER SPECIFIED PARTS OF DIGESTIVE TRACT: Chronic | ICD-10-CM

## 2021-07-29 PROCEDURE — 71250 CT THORAX DX C-: CPT | Mod: 26,MH

## 2021-07-29 PROCEDURE — 71250 CT THORAX DX C-: CPT | Mod: MH

## 2021-08-03 ENCOUNTER — APPOINTMENT (OUTPATIENT)
Dept: PULMONOLOGY | Facility: CLINIC | Age: 76
End: 2021-08-03
Payer: MEDICARE

## 2021-08-03 VITALS
RESPIRATION RATE: 16 BRPM | SYSTOLIC BLOOD PRESSURE: 138 MMHG | WEIGHT: 124 LBS | BODY MASS INDEX: 23.43 KG/M2 | DIASTOLIC BLOOD PRESSURE: 75 MMHG | TEMPERATURE: 207.68 F | HEART RATE: 77 BPM | OXYGEN SATURATION: 99 %

## 2021-08-03 PROCEDURE — 99214 OFFICE O/P EST MOD 30 MIN: CPT | Mod: GC

## 2021-08-03 NOTE — ASSESSMENT
[FreeTextEntry1] : 76 year-old F with PMH breast cancer s/p resection and radiation in 2003 who was found to have RML lesion on breast imaging, now s/p bronchoscopy, right thoracostomy, right middle lobectomy, and mediastinal lymph node sampling. She reports that she is doing well but was concerned about her cough and the CT Chest findings from 7/29/21. 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 I reassured her that did suggest that she have followup CAT scan in 3 months.\par \par \par \par Tate Brown, PGY-6\par Pulmonary and Critical Care Medicine\par

## 2021-08-03 NOTE — REVIEW OF SYSTEMS
[Cough] : cough [Negative] : Endocrine [Chest Tightness] : no chest tightness [Dyspnea] : no dyspnea [SOB on Exertion] : no sob on exertion

## 2021-08-03 NOTE — HISTORY OF PRESENT ILLNESS
[TextBox_4] : 76 year old female former smoker  (2 PPD x3 years; quit 1970), breast CA s/p lumpectomy and radiation 2003,  recent 0.9 cm RML lung lesion with mild avidity confirmed on PET scans/p bronchoscopy, right thoracoscopy, right middle lobectomy, and mediastinal lymph node sampling on 6/3/21 presents for a follow up. She reports that her recovery has been difficult post-operatively. She reports some blood tinged sputum on coughing on Saturday night, but no episodes since then. She was recommended to take Robitussin for cough but reports that she has not used it. She has tried Allegra and steam to help her symptoms.  She is following with an oncologist.\par \par She saw her PCP who heard diminished breath sounds anteriorly on the left and ordered a repeat CT Chest which was done on 7/29/21.\par \par

## 2021-08-03 NOTE — PHYSICAL EXAM
[No Acute Distress] : no acute distress [Normal Oropharynx] : normal oropharynx [Normal Appearance] : normal appearance [No Neck Mass] : no neck mass [Normal Rate/Rhythm] : normal rate/rhythm [Normal S1, S2] : normal s1, s2 [No Murmurs] : no murmurs [No Resp Distress] : no resp distress [Benign] : benign [Normal Gait] : normal gait [No Clubbing] : no clubbing [No Edema] : no edema [Normal Color/ Pigmentation] : normal color/ pigmentation [No Focal Deficits] : no focal deficits [Oriented x3] : oriented x3 [Normal Affect] : normal affect [TextBox_68] : +Slightly decreased breath sounds anteriorly on the left

## 2021-08-10 ENCOUNTER — APPOINTMENT (OUTPATIENT)
Dept: PHYSICAL MEDICINE AND REHAB | Facility: CLINIC | Age: 76
End: 2021-08-10
Payer: MEDICARE

## 2021-08-10 VITALS
SYSTOLIC BLOOD PRESSURE: 105 MMHG | DIASTOLIC BLOOD PRESSURE: 66 MMHG | OXYGEN SATURATION: 98 % | HEART RATE: 85 BPM | TEMPERATURE: 94.6 F

## 2021-08-10 DIAGNOSIS — M54.2 CERVICALGIA: ICD-10-CM

## 2021-08-10 PROCEDURE — 99214 OFFICE O/P EST MOD 30 MIN: CPT

## 2021-08-10 NOTE — PHYSICAL EXAM
[FreeTextEntry1] : GEN: AAOx3, NAD.\par PSYCH: Normal mood and affect. Responds appropriately to commands.\par EYES: Sclerae Anicteric. No discharge. EOMI.\par RESP: Breathing unlabored.\par CV: Radial pulses 2+ and equal. No varicosities noted.\par EXT: No C/C/E.\par SKIN: + healed surgical incisions\par LYMPH: No supraclavicular, anterior or posterior cervical lymphadenopathy appreciated.\par GAIT: Non antalgic, normal reciprocating heel to toe.\par INSP: No Atrophy, Bony Deformity, Swelling. Shoulder height symmetric. Normal Thoracic Kyphosis.\par Cervical ROM: Full ROM with reported discomfort at ends of ROM\par Shoulder AROM: R shoulder abduction limited 170 degrees\par PALP: + reports mild sensitivity at incision site,  + hypertonicity and tenderness b/l upper traps\par STRENGTH: 5/5 bilateral shoulder abductors, internal rotation, external rotation, elbow flexors, elbow extensors, wrist extensors, long finger flexors, hand intrinsics, .\par TONE: Normal, No clonus.\par SENSATION: Normal to light touch in the bilateral upper extremities. \par \par

## 2021-08-10 NOTE — ASSESSMENT
[FreeTextEntry1] : 75 yo f s/p thoracotomy for resection of lung nodule, with improvement in neuropathic pain\par -would add myofascial release, referred for OMM\par -will also refer for acupuncture if she is interested\par -continue gabapentin 300mg TID\par -continue cancer rehab therapy program for scapular dysfunction, myofascial pain, ROM\par -follow with Dr. Wilkins as scheduled for pulmonary nodule\par -follow up in 4-6 weeks. \par

## 2021-08-10 NOTE — HISTORY OF PRESENT ILLNESS
[FreeTextEntry1] : 77 yo F pmh breast ca dx 2003 s/p lumpectomy and radiation, found to have lung lesion and is s/p Bronchoscopy, right video thoracoscopy, right middle lobectomy, and mediastinal lymph node sampling on 6/3/21.She had residual radiating R sided chest pain after the surgery however has been attending PT with improvement and continues gabapentin. She is now off the tramadol and takes tylenol less often (reports she is now taking tylenol about every other day).   She now takes gabapentin 300mg 3x/day. Pain is improving overall and she states she feels some discomfort with reaching and lifting but it does not prevent her from doing activity.  She states pain has been mild without exacerbation. \par \par She reports burning pain in R posterior arm has resolved, now with residual tenderness to b/l rib cage with tenderness to palpation. She reports scapular pain is much improved.  However now has worsening muscle tightness in upper traps, she reports this is a chronic problem for her.  \par She ambulates without device and is accompanied by her  today. \par  \par \par \par Seeing Dr. Wilkins later this month. Had CT with increased pulmonary nodules

## 2021-08-11 ENCOUNTER — APPOINTMENT (OUTPATIENT)
Dept: PHYSICAL MEDICINE AND REHAB | Facility: CLINIC | Age: 76
End: 2021-08-11
Payer: MEDICARE

## 2021-08-11 DIAGNOSIS — M99.08 SEGMENTAL AND SOMATIC DYSFUNCTION OF RIB CAGE: ICD-10-CM

## 2021-08-11 DIAGNOSIS — M99.00 SEGMENTAL AND SOMATIC DYSFUNCTION OF HEAD REGION: ICD-10-CM

## 2021-08-11 DIAGNOSIS — M99.02 SEGMENTAL AND SOMATIC DYSFUNCTION OF THORACIC REGION: ICD-10-CM

## 2021-08-11 DIAGNOSIS — M54.2 CERVICALGIA: ICD-10-CM

## 2021-08-11 DIAGNOSIS — M99.07 SEGMENTAL AND SOMATIC DYSFUNCTION OF UPPER EXTREMITY: ICD-10-CM

## 2021-08-11 DIAGNOSIS — M99.01 SEGMENTAL AND SOMATIC DYSFUNCTION OF CERVICAL REGION: ICD-10-CM

## 2021-08-11 DIAGNOSIS — M79.18 MYALGIA, OTHER SITE: ICD-10-CM

## 2021-08-11 PROCEDURE — 98927 OSTEOPATH MANJ 5-6 REGIONS: CPT

## 2021-08-11 PROCEDURE — 99213 OFFICE O/P EST LOW 20 MIN: CPT | Mod: 25

## 2021-08-11 NOTE — PHYSICAL EXAM
[FreeTextEntry1] : Gen: Patient is A&O x 3, NAD\par HEENT: EOMI, hearing grossly normal\par Resp: regular, non - labored\par CV: pulses regular\par Skin: no rashes, erythema\par Lymph: no clubbing, cyanosis, edema, or palpable lymphadenopathy\par Inspection: no instability or misalignment\par ROM: full throughout, mild pain with cervical extension\par Palpation: TTP right richard-scapular muscles, left trapezius, Left C1 paraspinal\par Sensation: intact to light touch\par Reflexes: 1+ and symmetric throughout\par Strength: 5/5 throughout\par Special tests: -Spurling sign, -Brambila's sign \par Gait: normal, non-antalgic\par \par Osteopathic Structural Exam:\par Cranial: OA rotated left, right cranial torsion \par Cervical: Left C2 tenderpoint, Right C5 tenderpoint\par Thoracic: T2-T4 myofascial restriction right \par Rib: Right rib 1 tenderpoint \par Upper extremity: Right anterior humerus, right scapula myofascial restriction \par \par \par \par \par \par

## 2021-08-11 NOTE — ASSESSMENT
[FreeTextEntry1] : 76 year old female presenting for evaluation.\par \par #Neck pain/Myofascial pain:\par -MRI cervical spine reviewed\par -Thoracic surgery notes reviewed.\par -Discussed risks and benefits of OMT.\par -Osteopathic structural exam demonstrated somatic dysfunction and the patient agreed to osteopathic manipulation.\par  \par 1. Somatic dysfunction cranial\par -OMT performed with cranial sacral therapy and myofascial release \par 2. Somatic dysfunction cervical\par --OMT performed with counterstrain \par 3. Somatic dysfunction upper extremity\par --OMT performed with myofascial release and counterstrain \par 4. Somatic dysfunction rib\par --OMT performed with counterstrain\par 5. Somatic dysfunction thoracic \par --OMT performed with myofascial release \par \par Patient tolerated treatment well.\par \par Follow up in 2-3 weeks. \par \par \par

## 2021-08-11 NOTE — HISTORY OF PRESENT ILLNESS
[FreeTextEntry1] : Mr. Siddiqui is a 76 year old female with past medical history of breast cancer diagnosed in 2003 s/p lumpectomy and radiation, found to have lung lesion and is s/p Bronchoscopy, right video thoracoscopy, right middle lobectomy, and mediastinal lymph node sampling on 6/3/21.\par \par She denies any spine surgeries or joint replacements\par \par She reports that she has had neck pain for a few months.  She denies any radiation of pain down her arms or any weakness.  She reports the pain is mostly located along her shoulder and scapular region.  She has been performing physical therapy which has been improving this.  She was taking tramadol after surgery but has since stopped.  She denies any bowel bladder problems.\par \par

## 2021-08-11 NOTE — DATA REVIEWED
[FreeTextEntry1] : MRI Cervical Spine April 2021: There is no disc herniations, spinal canal or neuroforaminal stenosis.  Increased thoracic kyphosis and decreased cervical lordosis.  Mild anterolisthesis of C3 and C4.  Mild anterior listhesis of T2 on T3 and narrowing at T2-3 disc space.

## 2021-08-25 ENCOUNTER — TRANSCRIPTION ENCOUNTER (OUTPATIENT)
Age: 76
End: 2021-08-25

## 2021-08-25 ENCOUNTER — APPOINTMENT (OUTPATIENT)
Dept: THORACIC SURGERY | Facility: CLINIC | Age: 76
End: 2021-08-25
Payer: MEDICARE

## 2021-08-25 VITALS
BODY MASS INDEX: 23.6 KG/M2 | RESPIRATION RATE: 18 BRPM | WEIGHT: 125 LBS | OXYGEN SATURATION: 99 % | SYSTOLIC BLOOD PRESSURE: 152 MMHG | TEMPERATURE: 97.6 F | HEART RATE: 80 BPM | HEIGHT: 61 IN | DIASTOLIC BLOOD PRESSURE: 81 MMHG

## 2021-08-25 PROCEDURE — 99024 POSTOP FOLLOW-UP VISIT: CPT

## 2021-08-25 RX ORDER — GABAPENTIN 300 MG/1
300 CAPSULE ORAL
Qty: 120 | Refills: 1 | Status: DISCONTINUED | COMMUNITY
Start: 2021-07-12 | End: 2021-08-25

## 2021-08-25 RX ORDER — TRAMADOL HYDROCHLORIDE 50 MG/1
50 TABLET, COATED ORAL
Qty: 40 | Refills: 0 | Status: DISCONTINUED | COMMUNITY
Start: 2021-06-25 | End: 2021-08-25

## 2021-08-25 RX ORDER — GABAPENTIN 100 MG/1
100 CAPSULE ORAL 3 TIMES DAILY
Qty: 90 | Refills: 0 | Status: DISCONTINUED | COMMUNITY
Start: 2021-06-25 | End: 2021-08-25

## 2021-08-25 RX ORDER — GABAPENTIN 100 MG/1
100 CAPSULE ORAL 3 TIMES DAILY
Qty: 15 | Refills: 0 | Status: DISCONTINUED | COMMUNITY
Start: 2021-06-14 | End: 2021-08-25

## 2021-08-25 RX ORDER — TRAMADOL HYDROCHLORIDE 50 MG/1
50 TABLET, COATED ORAL EVERY 6 HOURS
Qty: 12 | Refills: 0 | Status: DISCONTINUED | COMMUNITY
Start: 2021-06-10 | End: 2021-08-25

## 2021-08-25 RX ORDER — GABAPENTIN 100 MG/1
100 CAPSULE ORAL TWICE DAILY
Qty: 56 | Refills: 0 | Status: DISCONTINUED | COMMUNITY
Start: 2021-06-17 | End: 2021-08-25

## 2021-08-25 RX ORDER — TRAMADOL HYDROCHLORIDE 50 MG/1
50 TABLET, COATED ORAL EVERY 6 HOURS
Qty: 28 | Refills: 0 | Status: DISCONTINUED | COMMUNITY
Start: 2021-06-17 | End: 2021-08-25

## 2021-08-30 ENCOUNTER — APPOINTMENT (OUTPATIENT)
Dept: OTOLARYNGOLOGY | Facility: CLINIC | Age: 76
End: 2021-08-30
Payer: MEDICARE

## 2021-08-30 VITALS
SYSTOLIC BLOOD PRESSURE: 119 MMHG | HEART RATE: 96 BPM | DIASTOLIC BLOOD PRESSURE: 71 MMHG | WEIGHT: 123 LBS | BODY MASS INDEX: 23.22 KG/M2 | HEIGHT: 61 IN | TEMPERATURE: 98 F

## 2021-08-30 DIAGNOSIS — R09.81 NASAL CONGESTION: ICD-10-CM

## 2021-08-30 DIAGNOSIS — K21.9 GASTRO-ESOPHAGEAL REFLUX DISEASE W/OUT ESOPHAGITIS: ICD-10-CM

## 2021-08-30 DIAGNOSIS — M26.609 UNSPECIFIED TEMPOROMANDIBULAR JOINT DISORDER: ICD-10-CM

## 2021-08-30 PROCEDURE — 99213 OFFICE O/P EST LOW 20 MIN: CPT

## 2021-08-30 NOTE — END OF VISIT
[Time Spent: ___ minutes] : I have spent [unfilled] minutes of time on the encounter. [FreeTextEntry3] : I personally saw and examined MARIFER BENTLEY in detail. I spoke to JANEEN Bronson regarding the assessment and plan of care. I reviewed the above assessment and plan of care, and agree. I have made changes in changes in the body of the note where appropriate.I personally reviewed the HPI, PMH, FH, SH, ROS and medications/allergies. I have spoken to JANEEN Bronson regarding the history and have personally determined the assessment and plan of care, and documented this myself. I was present and participated in all key portions of the encounter and all procedures noted above. I have made changes in the body of the note where appropriate.\par \par Attesting Faculty: See Attending Signature Below \par \par \par

## 2021-08-30 NOTE — ASSESSMENT
[FreeTextEntry1] : Patient with hx of vertigo presents for follow up, finished vestibular rehab. She recently underwent surgery for lung caner \par \par Vertigo\par -now resolved \par \par DNS and Nasal congestion:\par -continue with Flonase, nasal saline and Allegra \par -Steam humidification advised  \par \par Lightheadedness and fatigue is due to Gabapentin titration \par \par f/u 6 months or prn

## 2021-08-30 NOTE — PHYSICAL EXAM
[] : septum deviated to the left [Midline] : trachea located in midline position [Normal] : gait was normal [de-identified] : B/L tenderness  [Hearing Loss Right Only] : normal [Hearing Loss Left Only] : normal [Nystagmus] : ~T no ~M nystagmus was seen [Fukuda Step Test] : Fukuda Step Test was Negative

## 2021-08-30 NOTE — DATA REVIEWED
[de-identified] : bilat SNHL\par sl L>R\par Hearing Test performed to evaluate the extent of hearing loss and  to explain pt's symptoms\par

## 2021-08-30 NOTE — HISTORY OF PRESENT ILLNESS
[No] : patient does not have a  history of radiation therapy [Vertigo] : vertigo [Dizziness] : dizziness [Nasal Congestion] : nasal congestion [de-identified] : 77 yo female with hx of vertigo presents for follow up. She was recently treated for lung cancer with surgery. States she is currently titrating gabapentin and feels slightly lightheaded and weak. Dizziness otherwise is controlled. SHe takes Flonase and nasal saline with Allegra for her allergies and nasal congestion which helps. Pt has no ear pain, ear drainage, hearing loss, tinnitus, nasal discharge, epistaxis, sinus infections, facial pain, facial pressure, throat pain, dysphagia or fevers\par \par \par \par \par  [Anxiety] : no anxiety [Headache] : no headache [Hearing Loss] : no hearing loss [Recurrent Otitis Media] : no recurrent otitis media [Otitis Media with Effusion] : no otitis media with effusion [Presbycusis] : no presbycusis [Congenital Ear Malformation] : no congenital ear malformation [Meniere Disease] : no Meniere disease [Otosclerosis] : no otosclerosis [Perilymphatic Fistula] : no perilymphatic fistula [Hypertension] : no hypertension [Loud Noise Exposure] : no history of loud noise exposure [Smoking] : no smoking [Early Onset Hearing Loss] : no early onset hearing loss [Stroke] : no stroke [Facial Pain] : no facial pain [Facial Pressure] : no facial pressure [Diplopia] : no diplopia [Ear Fullness] : no ear fullness [Allergic Rhinitis] : no allergic rhinitis [Maxillary Tooth Infection] : no maxillary tooth infection [Septal Deviation] : no septal deviation [Environmental Allergies] : no environmental allergies [Seasonal Allergies] : no seasonal allergies [Environmental Allergens] : no environmental allergens [Adenoidectomy] : no adenoidectomy [Nasal FB Removal] : no nasal foreign body removal [Allergies] : no allergies [Asthma] : no asthma [Neck Mass] : no neck mass [Neck Pain] : no neck pain [Chills] : no chills [Cold Intolerance] : no cold intolerance [Cough] : no cough [Fatigue] : no fatigue [Heat Intolerance] : no heat intolerance [Hyperthyroidism] : no hyperthyroidism [Sialadenitis] : no sialadenitis [Hodgkin Disease] : no hodgkin disease [Non-Hodgkin Lymphoma] : no non-hodgkin lymphoma [Tobacco Use] : no tobacco use [Alcohol Use] : no alcohol use [Graves Disease] : no graves disease [Thyroid Cancer] : no thyroid cancer

## 2021-09-01 ENCOUNTER — APPOINTMENT (OUTPATIENT)
Dept: PHYSICAL MEDICINE AND REHAB | Facility: CLINIC | Age: 76
End: 2021-09-01
Payer: MEDICARE

## 2021-09-01 ENCOUNTER — OUTPATIENT (OUTPATIENT)
Dept: OUTPATIENT SERVICES | Facility: HOSPITAL | Age: 76
LOS: 1 days | End: 2021-09-01
Payer: MEDICARE

## 2021-09-01 ENCOUNTER — APPOINTMENT (OUTPATIENT)
Dept: RADIOLOGY | Facility: CLINIC | Age: 76
End: 2021-09-01
Payer: MEDICARE

## 2021-09-01 ENCOUNTER — RESULT REVIEW (OUTPATIENT)
Age: 76
End: 2021-09-01

## 2021-09-01 DIAGNOSIS — Z98.89 OTHER SPECIFIED POSTPROCEDURAL STATES: Chronic | ICD-10-CM

## 2021-09-01 DIAGNOSIS — Z98.890 OTHER SPECIFIED POSTPROCEDURAL STATES: Chronic | ICD-10-CM

## 2021-09-01 DIAGNOSIS — M54.5 LOW BACK PAIN: ICD-10-CM

## 2021-09-01 DIAGNOSIS — Z90.49 ACQUIRED ABSENCE OF OTHER SPECIFIED PARTS OF DIGESTIVE TRACT: Chronic | ICD-10-CM

## 2021-09-01 DIAGNOSIS — Z90.710 ACQUIRED ABSENCE OF BOTH CERVIX AND UTERUS: Chronic | ICD-10-CM

## 2021-09-01 PROCEDURE — 72110 X-RAY EXAM L-2 SPINE 4/>VWS: CPT | Mod: 26

## 2021-09-01 PROCEDURE — 72110 X-RAY EXAM L-2 SPINE 4/>VWS: CPT

## 2021-09-01 PROCEDURE — 99213 OFFICE O/P EST LOW 20 MIN: CPT

## 2021-09-01 NOTE — PHYSICAL EXAM
[FreeTextEntry1] : Gen: Patient is A&O x 3, NAD\par HEENT: EOMI, hearing grossly normal\par Resp: regular, non - labored\par CV: pulses regular\par Skin: no rashes, erythema\par Lymph: no clubbing, cyanosis, edema, or palpable lymphadenopathy\par Inspection: no instability or misalignment\par ROM: full throughout, mild pain with lumbar flexion, no pain with extension or facet loading \par Palpation: TTP L3-L4 spinous process \par Sensation: intact to light touch\par Reflexes: 1+ and symmetric throughout\par Strength: 5/5 throughout\par Special tests: -straight leg raise, no clonus \par Gait: normal, non-antalgic\par \par \par \par \par \par \par

## 2021-09-01 NOTE — HISTORY OF PRESENT ILLNESS
[FreeTextEntry1] : Mr. Siddiqui is a 76 year old female with past medical history of breast cancer diagnosed in 2003 s/p lumpectomy and radiation, found to have lung lesion and is s/p Bronchoscopy, right video thoracoscopy, right middle lobectomy, and mediastinal lymph node sampling on 6/3/21.\par \par She denies any spine surgeries or joint replacements\par \par She was treated with OMT at last visit.  She is unsure if this provided her any relief.  She currently reports her pain is stable.  She denies any new weakness or numbness tingling or bowel bladder problems.  She reporting new low back pain.  She states it began about 2 weeks ago.  She denies any inciting events or trauma.  She does report that she was performing exercises with her  which may exacerbate it.  She denies any radiation pain down her legs or any leg weakness.  Pain is mostly midline.  She is taking tylenol for the pain which helps.  \par

## 2021-09-01 NOTE — ASSESSMENT
[FreeTextEntry1] : 76 year old female presenting for evaluation.\par \par #Low back pain:\par -Axial low back pain, no signs of neurologic deficits or other red flag signs on exam \par -Discussed multiple modalities to further evaluate, will start with x-ray of lumbar spine.  If no improvement will proceed with MRI, she cannot have contrast due to anaphylaxis.  \par -Continue Tylenol as needed for pain\par -Advised to avoid exacerbating activities.\par \par Follow up in 2-3 weeks. \par \par \par

## 2021-09-08 ENCOUNTER — APPOINTMENT (OUTPATIENT)
Dept: PHYSICAL MEDICINE AND REHAB | Facility: CLINIC | Age: 76
End: 2021-09-08
Payer: MEDICARE

## 2021-09-08 PROCEDURE — 99213 OFFICE O/P EST LOW 20 MIN: CPT | Mod: 95

## 2021-09-08 NOTE — HISTORY OF PRESENT ILLNESS
[FreeTextEntry1] : This visit was provided via Telehealth using real-time 2 way audio visual technology. The Patient, Tonya Siddiqui, was located at HOME,  ADDRESS, at the time of the visit. The provider Chris Davies DO was located at the medical office located in [place] at the time of the visit. The patient Tonya Siddiqui and provider participated in the telehealth encounter. Verbal consent for telehealth was given on 9/8/21 by the patient Tonya Siddiqui.\par \par \par Mr. Siddiqui is a 76 year old female with past medical history of breast cancer diagnosed in 2003 s/p lumpectomy and radiation, found to have lung lesion and is s/p Bronchoscopy, right video thoracoscopy, right middle lobectomy, and mediastinal lymph node sampling on 6/3/21.\par \par She denies any spine surgeries or joint replacements\par \par She reports her back pain is improving.  She denies any radiation of pain down her legs weakness or bowel bladder problems.  She still does have some discomfort in the midline especially with bending forward.  No pain with bending backwards.

## 2021-09-08 NOTE — ASSESSMENT
[FreeTextEntry1] : 76 year old female presenting for evaluation.\par \par #Low back pain:\par -Axial low back pain, no signs of neurologic deficits or other red flag signs\par -x-ray lumbar spine reviewed\par -Continue Tylenol as needed for pain.\par -Discussed further evaluation with MRI, patient would like to defer for now as pain improving, she will notify me if pain continues or persist.\par -Patient advised she may begin physical activities to promote range of motion as well as increase her core strength.  She was instructed to avoid any exacerbating motions.\par \par \par Follow up in 2-3 weeks. \par \par \par

## 2021-09-08 NOTE — DATA REVIEWED
[FreeTextEntry1] : x-ray lumbar spine September 2021: No compression fractures, spondylolisthesis or spondylosis.  Disc space level heights preserved and facet alignment maintained.  Unremarkable SI joints.  No lytic or blastic lesions.

## 2021-09-08 NOTE — PHYSICAL EXAM
[FreeTextEntry1] : Virtual exam: \par \par Gen: Patient is A&O x 3, NAD\par HEENT: EOMI, hearing grossly normal\par Resp: regular, non - labored\par Skin: no rashes, erythema\par Lymph: no clubbing, cyanosis, edema, or palpable lymphadenopathy\par Inspection: no instability or misalignment\par ROM: Mild pain with lumbar flexion, no pain with extension \par Strength: Appears anti-gravity throughout \par Gait: normal, non-antalgic\par \par \par \par \par \par \par

## 2021-09-20 ENCOUNTER — APPOINTMENT (OUTPATIENT)
Dept: PHYSICAL MEDICINE AND REHAB | Facility: CLINIC | Age: 76
End: 2021-09-20
Payer: MEDICARE

## 2021-09-20 VITALS
OXYGEN SATURATION: 99 % | HEART RATE: 76 BPM | DIASTOLIC BLOOD PRESSURE: 73 MMHG | TEMPERATURE: 97.3 F | SYSTOLIC BLOOD PRESSURE: 123 MMHG

## 2021-09-20 PROCEDURE — 99214 OFFICE O/P EST MOD 30 MIN: CPT

## 2021-09-20 NOTE — ASSESSMENT
[FreeTextEntry1] : 76 year old female pmh breast ca, thoracotomy for resection of lung nodule, now with focal spine pain worse with movement.  x rays negative, pain improving. completed prior therapy course, continues home exercise without exacerbation\par no bowel or bladder changes, no radicular pain\par can discontinue gabapentin\par patient is due for MRI liver, is seeing oncology tomorrow\par will order MRI thoracic spine- patient states she is allergic to contrast.  will clarify if patient allergic to MRI contrast or CT contrast- patient reports she has had MRI in the past.  \par can resume OMM for upper trapezius pain after results\par acupuncture if interested\par follow up after MRI (can be phone visit if preferred)

## 2021-09-20 NOTE — PHYSICAL EXAM
[FreeTextEntry1] : GEN: AAOx3, NAD.\par PSYCH: Normal mood and affect. Responds appropriately to commands.\par EYES: Sclerae Anicteric. No discharge. EOMI.\par RESP: Breathing unlabored.\par CV: Radial pulses 2+ and equal. No varicosities noted.\par EXT: No C/C/E.\par SKIN: + healed surgical incisions\par LYMPH: No supraclavicular, anterior or posterior cervical lymphadenopathy appreciated.\par GAIT: Non antalgic, normal reciprocating heel to toe.\par INSP: No Atrophy, Bony Deformity, Swelling. Shoulder height symmetric. Normal Thoracic Kyphosis.\par Cervical ROM: Full ROM with reported discomfort at ends of ROM \par PALP: + mild focal tenderness thoracic spine, + hypertonicity and tenderness b/l upper traps\par STRENGTH: 5/5 bilateral shoulder abductors, internal rotation, external rotation, elbow flexors, elbow extensors, wrist extensors, long finger flexors, hand intrinsics, .\par TONE: Normal, No clonus.\par SENSATION: Normal to light touch in the bilateral upper extremities. \par \par  \par \par  \par

## 2021-09-20 NOTE — HISTORY OF PRESENT ILLNESS
[FreeTextEntry1] : 77 yo F pmh breast ca dx 2003 s/p lumpectomy and radiation, found to have lung lesion and is s/p Bronchoscopy, right video thoracoscopy, right middle lobectomy, and mediastinal lymph node sampling on 6/3/21.She had residual radiating R sided chest pain after the surgery however has been attending PT with improvement and continues gabapentin. \par \par She reports new focal R sided back pain which began without inciting incident, in the thoracic area.  She had been seeing Dr. Davies for OMM and had x ray done which was negative.   Pain was initially 3/10, pain is now less frequent, is mild and improving overall but she is concerned due to her medical history. She states treadmill does not worsen pain, but she has some discomfort with bending. Pain worsens with sitting, improves with standing unless standing for long periods.  \par \par She takes tylenol on average once a day.  She has been tapering gabapentin and is now on 100mg daily.  Pain is improving overall and she states she feels some discomfort with reaching and lifting but it does not prevent her from doing activity. She states pain has been mild without exacerbation. \par \par Burning pain in R posterior arm has resolved and did not return witih gabapentin taper.  She still has mild tenderness to palpation b/l ribs and upper trap muscle tightness and tenderness. \par \par She ambulates without device and is accompanied by her  today. \par  \par

## 2021-09-22 ENCOUNTER — TRANSCRIPTION ENCOUNTER (OUTPATIENT)
Age: 76
End: 2021-09-22

## 2021-09-23 ENCOUNTER — TRANSCRIPTION ENCOUNTER (OUTPATIENT)
Age: 76
End: 2021-09-23

## 2021-09-25 ENCOUNTER — OUTPATIENT (OUTPATIENT)
Dept: OUTPATIENT SERVICES | Facility: HOSPITAL | Age: 76
LOS: 1 days | End: 2021-09-25
Payer: MEDICARE

## 2021-09-25 ENCOUNTER — APPOINTMENT (OUTPATIENT)
Dept: MRI IMAGING | Facility: CLINIC | Age: 76
End: 2021-09-25
Payer: MEDICARE

## 2021-09-25 DIAGNOSIS — Z98.89 OTHER SPECIFIED POSTPROCEDURAL STATES: Chronic | ICD-10-CM

## 2021-09-25 DIAGNOSIS — Z98.890 OTHER SPECIFIED POSTPROCEDURAL STATES: Chronic | ICD-10-CM

## 2021-09-25 DIAGNOSIS — Z90.710 ACQUIRED ABSENCE OF BOTH CERVIX AND UTERUS: Chronic | ICD-10-CM

## 2021-09-25 DIAGNOSIS — Z90.49 ACQUIRED ABSENCE OF OTHER SPECIFIED PARTS OF DIGESTIVE TRACT: Chronic | ICD-10-CM

## 2021-09-25 DIAGNOSIS — N60.91 UNSPECIFIED BENIGN MAMMARY DYSPLASIA OF RIGHT BREAST: ICD-10-CM

## 2021-09-25 PROCEDURE — A9585: CPT

## 2021-09-25 PROCEDURE — 72157 MRI CHEST SPINE W/O & W/DYE: CPT

## 2021-09-25 PROCEDURE — 72157 MRI CHEST SPINE W/O & W/DYE: CPT | Mod: 26,MH

## 2021-09-27 ENCOUNTER — APPOINTMENT (OUTPATIENT)
Dept: ULTRASOUND IMAGING | Facility: CLINIC | Age: 76
End: 2021-09-27

## 2021-09-27 ENCOUNTER — TRANSCRIPTION ENCOUNTER (OUTPATIENT)
Age: 76
End: 2021-09-27

## 2021-09-30 ENCOUNTER — TRANSCRIPTION ENCOUNTER (OUTPATIENT)
Age: 76
End: 2021-09-30

## 2021-10-02 ENCOUNTER — APPOINTMENT (OUTPATIENT)
Dept: MRI IMAGING | Facility: CLINIC | Age: 76
End: 2021-10-02

## 2021-10-04 ENCOUNTER — TRANSCRIPTION ENCOUNTER (OUTPATIENT)
Age: 76
End: 2021-10-04

## 2021-10-04 ENCOUNTER — OUTPATIENT (OUTPATIENT)
Dept: OUTPATIENT SERVICES | Facility: HOSPITAL | Age: 76
LOS: 1 days | End: 2021-10-04
Payer: MEDICARE

## 2021-10-04 ENCOUNTER — APPOINTMENT (OUTPATIENT)
Dept: MRI IMAGING | Facility: IMAGING CENTER | Age: 76
End: 2021-10-04
Payer: MEDICARE

## 2021-10-04 DIAGNOSIS — Z90.710 ACQUIRED ABSENCE OF BOTH CERVIX AND UTERUS: Chronic | ICD-10-CM

## 2021-10-04 DIAGNOSIS — Z98.89 OTHER SPECIFIED POSTPROCEDURAL STATES: Chronic | ICD-10-CM

## 2021-10-04 DIAGNOSIS — Z98.890 OTHER SPECIFIED POSTPROCEDURAL STATES: Chronic | ICD-10-CM

## 2021-10-04 DIAGNOSIS — Z00.8 ENCOUNTER FOR OTHER GENERAL EXAMINATION: ICD-10-CM

## 2021-10-04 DIAGNOSIS — Z90.49 ACQUIRED ABSENCE OF OTHER SPECIFIED PARTS OF DIGESTIVE TRACT: Chronic | ICD-10-CM

## 2021-10-04 PROCEDURE — 72148 MRI LUMBAR SPINE W/O DYE: CPT | Mod: 26,MH

## 2021-10-04 PROCEDURE — 72148 MRI LUMBAR SPINE W/O DYE: CPT | Mod: MH

## 2021-10-07 ENCOUNTER — NON-APPOINTMENT (OUTPATIENT)
Age: 76
End: 2021-10-07

## 2021-10-14 ENCOUNTER — NON-APPOINTMENT (OUTPATIENT)
Age: 76
End: 2021-10-14

## 2021-10-15 ENCOUNTER — EMERGENCY (EMERGENCY)
Facility: HOSPITAL | Age: 76
LOS: 1 days | Discharge: HOME | End: 2021-10-15
Attending: EMERGENCY MEDICINE | Admitting: EMERGENCY MEDICINE
Payer: MEDICARE

## 2021-10-15 VITALS
DIASTOLIC BLOOD PRESSURE: 84 MMHG | HEART RATE: 87 BPM | HEIGHT: 61 IN | TEMPERATURE: 98 F | SYSTOLIC BLOOD PRESSURE: 153 MMHG | OXYGEN SATURATION: 100 % | RESPIRATION RATE: 16 BRPM

## 2021-10-15 DIAGNOSIS — Z98.890 OTHER SPECIFIED POSTPROCEDURAL STATES: Chronic | ICD-10-CM

## 2021-10-15 DIAGNOSIS — Z98.89 OTHER SPECIFIED POSTPROCEDURAL STATES: Chronic | ICD-10-CM

## 2021-10-15 DIAGNOSIS — Z90.49 ACQUIRED ABSENCE OF OTHER SPECIFIED PARTS OF DIGESTIVE TRACT: Chronic | ICD-10-CM

## 2021-10-15 DIAGNOSIS — Z90.710 ACQUIRED ABSENCE OF BOTH CERVIX AND UTERUS: Chronic | ICD-10-CM

## 2021-10-15 PROCEDURE — 71046 X-RAY EXAM CHEST 2 VIEWS: CPT | Mod: 26

## 2021-10-15 PROCEDURE — 70360 X-RAY EXAM OF NECK: CPT | Mod: 26

## 2021-10-15 PROCEDURE — 99283 EMERGENCY DEPT VISIT LOW MDM: CPT | Mod: GC

## 2021-10-15 NOTE — ED PROVIDER NOTE - PATIENT PORTAL LINK FT
You can access the FollowMyHealth Patient Portal offered by [facility] by registering at the following website: [email or web address]. By joining [employer]’s FollowMyHealth portal, you will also be able to view your health information using other applications (apps) compatible with our system.

## 2021-10-15 NOTE — ED PROVIDER NOTE - ATTENDING CONTRIBUTION TO CARE
I have seen and examined the patient on the patient´s visit date. I have reviewed the note written by Kirill Adkins MD  on that visit day. I have supervised and participated as necessary in the performance of procedures indicated for patient management and was available at all phases of the patient´s visit when needed. We discussed the history, physical exam findings, management plan, and  medical decision making. I have made my additions, exceptions, and revisions within the chart and I agree with H and P as documented in its entirety. The data and my interpretation of any data collected from labs, interventions and imaging appear below as well as my independent medical decision making and considerations    The patient is a 76y Female who has a past medical and surgery history of Urinary incontinence Constipation bowel surgery w/ adhesions of intestine History of proctitis Osteoporosis rt Lung mass Glaucoma (R) breast lumpectomy 2003 Mohs surgery appendectomy hysterectomy/ovarian cystectomy post right groin phlebectomy basal cell carcinoma excision PTED with cough and voice change s/p colonoscopy done two days ago    Vital Signs  PE: as described; my additions and exceptions are noted in the chart; normal pharynx; no pain on upward  gaze with head movement no stridor drooling     IMPRESSION/RISK:  Dx=dysphagia on patients post procedure    Consideration include; normal but would do lat neck to screen for prevertebral swelling   if negative d/c with ENT followup  RTED PRN

## 2021-10-15 NOTE — ED PROVIDER NOTE - NSICDXPASTMEDICALHX_GEN_ALL_CORE_FT
PAST MEDICAL HISTORY:  Adhesion of intestine     Breast cancer (R) 2003 lumpectomy    Constipation     GERD (gastroesophageal reflux disease)     Glaucoma     History of proctitis     Lung mass (R)    Osteoporosis     Urinary incontinence in female

## 2021-10-15 NOTE — ED ADULT NURSE NOTE - OBJECTIVE STATEMENT
Pt to bed 19. Alert and oriented  x3. Pt c/o cough and sore throat from suctioning. Pt awaiting imaging. Will continue to monitor.

## 2021-10-15 NOTE — ED PROVIDER NOTE - NSFOLLOWUPINSTRUCTIONS_ED_ALL_ED_FT
Pharyngitis    WHAT YOU NEED TO KNOW:    What is pharyngitis? Pharyngitis, or sore throat, is inflammation of the tissues and structures in your pharynx (throat). Pharyngitis is most often caused by bacteria. It may also be caused by a cold or flu virus. Other causes include smoking, allergies, or acid reflux.     What signs and symptoms may occur with pharyngitis?   •Sore throat or pain when you swallow      •Fever, chills, and body aches      •Hoarse or raspy voice      •Cough, runny or stuffy nose, itchy or watery eyes      •Headache      •Upset stomach and loss of appetite      •Mild neck stiffness      •Swollen glands that feel like hard lumps when you touch your neck      •White and yellow pus-filled blisters in the back of your throat      How is pharyngitis diagnosed? Tell your healthcare provider about your symptoms. He may look inside your throat and feel your neck. You may also need the following tests:   •A throat culture may show which germ is causing your sore throat. A cotton swab is rubbed against the back of your throat.      •Blood tests may be used to show if another medical condition is causing your sore throat.      How is pharyngitis treated? Viral pharyngitis will go away on its own without treatment. Your sore throat should start to feel better in 3 to 5 days for both viral and bacterial infections. You may need any of the following:   •Antibiotics treat a bacterial infection.      •NSAIDs, such as ibuprofen, help decrease swelling, pain, and fever. NSAIDs can cause stomach bleeding or kidney problems in certain people. If you take blood thinner medicine, always ask your healthcare provider if NSAIDs are safe for you. Always read the medicine label and follow directions.      •Acetaminophen decreases pain and fever. It is available without a doctor's order. Ask how much to take and how often to take it. Follow directions. Acetaminophen can cause liver damage if not taken correctly.      How can I manage my symptoms?   •Gargle salt water. Mix ¼ teaspoon salt in an 8 ounce glass of warm water and gargle. This may help decrease swelling in your throat.      •Drink liquids as directed. You may need to drink more liquids than usual. Liquids may help soothe your throat and prevent dehydration. Ask how much liquid to drink each day and which liquids are best for you.      •Use a cool-steam humidifier to help moisten the air in your room and decrease your cough.      •Soothe your throat with cough drops, ice, soft foods, or popsicles.      How can I prevent the spread of pharyngitis? Cover your mouth and nose when you cough or sneeze. Do not share food or drinks. Wash your hands often. Use soap and water. If soap and water are unavailable, use an alcohol based hand .     Call 911 for any of the following:   •You have trouble breathing or swallowing because your throat is swollen or sore.          When should I seek immediate care?   •You are drooling because it hurts too much to swallow.      •Your fever is higher than 102°F (39°C) or lasts longer than 3 days.      •You are confused.      •You taste blood in your throat.      When should I contact my healthcare provider?   •Your throat pain gets worse.      •You have a painful lump in your throat that does not go away after 5 days.      •Your symptoms do not improve after 5 days.      •You have questions or concerns about your condition or care.      CARE AGREEMENT:    You have the right to help plan your care. Learn about your health condition and how it may be treated. Discuss treatment options with your healthcare providers to decide what care you want to receive. You always have the right to refuse treatment. Pharyngitis    WHAT YOU NEED TO KNOW:    What is pharyngitis? Pharyngitis, or sore throat, is inflammation of the tissues and structures in your pharynx (throat). Pharyngitis is most often caused by bacteria. It may also be caused by a cold or flu virus. Other causes include smoking, allergies, or acid reflux.     What signs and symptoms may occur with pharyngitis?   •Sore throat or pain when you swallow      •Fever, chills, and body aches      •Hoarse or raspy voice      •Cough, runny or stuffy nose, itchy or watery eyes      •Headache      •Upset stomach and loss of appetite      •Mild neck stiffness      •Swollen glands that feel like hard lumps when you touch your neck      •White and yellow pus-filled blisters in the back of your throat      How is pharyngitis diagnosed? Tell your healthcare provider about your symptoms. He may look inside your throat and feel your neck. You may also need the following tests:   •A throat culture may show which germ is causing your sore throat. A cotton swab is rubbed against the back of your throat.      •Blood tests may be used to show if another medical condition is causing your sore throat.      How is pharyngitis treated? Viral pharyngitis will go away on its own without treatment. Your sore throat should start to feel better in 3 to 5 days for both viral and bacterial infections. You may need any of the following:   •Antibiotics treat a bacterial infection.      •NSAIDs, such as ibuprofen, help decrease swelling, pain, and fever. NSAIDs can cause stomach bleeding or kidney problems in certain people. If you take blood thinner medicine, always ask your healthcare provider if NSAIDs are safe for you. Always read the medicine label and follow directions.      •Acetaminophen decreases pain and fever. It is available without a doctor's order. Ask how much to take and how often to take it. Follow directions. Acetaminophen can cause liver damage if not taken correctly.      How can I manage my symptoms?   •Gargle salt water. Mix ¼ teaspoon salt in an 8 ounce glass of warm water and gargle. This may help decrease swelling in your throat.      •Drink liquids as directed. You may need to drink more liquids than usual. Liquids may help soothe your throat and prevent dehydration. Ask how much liquid to drink each day and which liquids are best for you.      •Use a cool-steam humidifier to help moisten the air in your room and decrease your cough.      •Soothe your throat with cough drops, ice, soft foods, or popsicles.      How can I prevent the spread of pharyngitis? Cover your mouth and nose when you cough or sneeze. Do not share food or drinks. Wash your hands often. Use soap and water. If soap and water are unavailable, use an alcohol based hand .     Call 911 for any of the following:   •You have trouble breathing or swallowing because your throat is swollen or sore.          When should I seek immediate care?   •You are drooling because it hurts too much to swallow.      •Your fever is higher than 102°F (39°C) or lasts longer than 3 days.      •You are confused.      •You taste blood in your throat.      When should I contact my healthcare provider?   •Your throat pain gets worse.      •You have a painful lump in your throat that does not go away after 5 days.      •Your symptoms do not improve after 5 days.      •You have questions or concerns about your condition or care.      CARE AGREEMENT:    You have the right to help plan your care. Learn about your health condition and how it may be treated. Discuss treatment options with your healthcare providers to decide what care you want to receive. You always have the right to refuse treatment.    The patient is a 76y Female who has a past medical and surgery history of Urinary incontinence Constipation bowel surgery w/ adhesions of intestine History of proctitis Osteoporosis rt Lung mass Glaucoma (R) breast lumpectomy 2003 Mohs surgery appendectomy hysterectomy/ovarian cystectomy post right groin phlebectomy basal cell carcinoma excision PTED with cough and voice change s/p colonoscopy done two days ago    Vital Signs  PE: as described; my additions and exceptions are noted in the chart; normal pharynx; no pain on upward  gaze with head movement no stridor drooling     IMPRESSION/RISK:  Dx=dysphagia on patients post procedure    Consideration include; normal but would do lat neck to screen for prevertebral swelling   if negative d/c with ENT followup  RTED PRN

## 2021-10-15 NOTE — ED PROVIDER NOTE - IV ALTEPLASE DOOR HIDDEN
show
CONSTITUTIONAL: NAD  SKIN: Warm dry  HEAD: NCAT  EYES: NL inspection  ENT: MMM  NECK: Supple; non tender.  CARD: S1S2, RRR  RESP: CTAB/L, no wheezing  ABD: S/NT no R/G  EXT: no pedal edema  NEURO: Grossly unremarkable  PSYCH: Cooperative, appropriate.

## 2021-10-15 NOTE — ED PROVIDER NOTE - NS ED ROS FT
Constitutional: No fever, chills.  Eyes:  No visual changes  ENMT:  No neck pain +sore throat   Cardiac:  No chest pain  Respiratory:  +cough  GI:  No nausea, vomiting, diarrhea, abdominal pain.  :  No dysuria, hematuria  MS:  No back pain.  Neuro:  No headache or lightheadedness  Skin:  No skin rash  Except as documented in the HPI,  all other systems are negative.

## 2021-10-15 NOTE — ED CLERICAL - NS ED CLERK NOTE PRE-ARRIVAL INFORMATION; ADDITIONAL PRE-ARRIVAL INFORMATION
Dr. Pratt ([phone]) pt s/p right VATS with middle lobe resection recently had endoscopy, presenting today with hemoptysis.

## 2021-10-15 NOTE — ED PROVIDER NOTE - PROGRESS NOTE DETAILS
Kirill Adkins PGY-2 spoke with Dr. Chaves GI doctor who performed procedure, state pt was not intubated, no endoscopy was performed. pt had moderate secretions and had to be suctioned during procedure. no desatting episodes during procedure.

## 2021-10-15 NOTE — ED PROVIDER NOTE - PHYSICAL EXAMINATION
Vital signs reviewed  GENERAL: Patient nontoxic appearing, NAD  HEAD: NCAT  EYES: Anicteric  ENT: MMM. mild pharyngeal erythema, no lymphadenopathy. neck ROM intact   NECK: Supple, non tender  RESPIRATORY: Normal respiratory effort. CTA B/L. No wheezing, rales, rhonchi  CARDIOVASCULAR: Regular rate and rhythm  ABDOMEN: Soft. Nondistended. Nontender. No guarding or rebound. No CVA tenderness.  MUSCULOSKELETAL/EXTREMITIES: Brisk cap refill. 2+ radial pulses. No leg edema.  SKIN:  Warm and dry  NEURO: AAOx3. No gross FND.  PSYCHIATRIC: Cooperative. Affect appropriate.

## 2021-10-15 NOTE — ED PROVIDER NOTE - OBJECTIVE STATEMENT
77 y/o female, former smoker 2 PPD x3 years, stopped in 1960's.  Hx of breast cancer (R) , s/p lumpectomy and Rad tx 2003. lung Adenocarcinoma s/p resection p/w cough and voice changes x 2 days. pt states she had a colonoscopy done 2 days ago, sxs began after. Cough has productive sputum with mild blood tinged. decrease voice and sore throat.   pt denies any fever, chills, dizziness, cp, palpitations, sob, abdominal pain, n/v/d, neck pain, drooling  tolerating PO

## 2021-10-15 NOTE — ED PROVIDER NOTE - NSICDXPASTSURGICALHX_GEN_ALL_CORE_FT
PAST SURGICAL HISTORY:  H/O basal cell carcinoma excision 2015 groin    H/O lumpectomy (R) breast 2003    H/O ovarian cystectomy (R) 1981    H/O total hysterectomy 1987    History of appendectomy     S/P appendectomy peritonitis 1980    Status post Mohs surgery nose 2011    Status post phlebectomy RLE 2003

## 2021-10-15 NOTE — ED ADULT TRIAGE NOTE - CHIEF COMPLAINT QUOTE
Pt states that she had a colonoscopy on Wednesday and has been having a sore throat and cough since.  Pt states that she required suctioning during the procedure.  PMH: HTN, lung cancer, currently being evaluated for liver cancer

## 2021-10-15 NOTE — ED PROVIDER NOTE - CLINICAL SUMMARY MEDICAL DECISION MAKING FREE TEXT BOX
77 y/o female p/w cough and sore throat s/p colonoscopy. vss, on exam mild pharyngeal erythema, uvula midline, no tonsillar enlargement, neck rom intact lungs cta. likely irritation 2/2 to suctioning or secretions, low suspicion for rpa, pta, aspiration pna. jeff mendenhall xray and reassess.

## 2021-10-18 ENCOUNTER — APPOINTMENT (OUTPATIENT)
Dept: SURGICAL ONCOLOGY | Facility: CLINIC | Age: 76
End: 2021-10-18
Payer: MEDICARE

## 2021-10-18 VITALS
SYSTOLIC BLOOD PRESSURE: 155 MMHG | WEIGHT: 124 LBS | RESPIRATION RATE: 17 BRPM | DIASTOLIC BLOOD PRESSURE: 79 MMHG | HEIGHT: 61 IN | OXYGEN SATURATION: 100 % | HEART RATE: 108 BPM | BODY MASS INDEX: 23.41 KG/M2

## 2021-10-18 PROCEDURE — 99214 OFFICE O/P EST MOD 30 MIN: CPT

## 2021-10-18 NOTE — HISTORY OF PRESENT ILLNESS
[de-identified] : Tonya is a 76 year old female who presents to the office for 6-month Breast Exam follow up visit and for new liver mass. \par \par Past Hx: \par s/p RIGHT Breast Lumpectomy on 10/8/2003 for moderately differentiated infiltrating ductal carcinoma, followed by Radiation Therapy.  Completed course of Tamoxifen and Arimidex.\par \par BRCA NEGATIVE\par \par Mammogram/sonogram 10/2020- BIRADS 1 \par Bilateral Breast MRI 6/2020- BIRADS 1\par Blood work done by Dr. Benavides ([facility]).  CA27.29 done in 11/2020 (13.8 U/mL)\par Colonoscopy 8/2019- Diverticulosis of the descending colon.  Internal hemorrhoids. \par \par Breast MRI 4/2021- There is a 0.9 cm enhancing mass in the right midlung which is suspicious. Recommend chest CT.  There is a 1.5 cm enhancing mass in the right lobe of the liver, incompletely characterized.  Recommend CT.  No MRI findings of malignancy in either breast. BIRADS 2 \par \par MRI Liver 4/2021- Left hepatic 13 mm mildly T2 hyperintense lesion may represent a hemangioma but is indeterminate. Malignant lesion is not excluded. Continued f/u is recommended. \par \par Pt. is s/p right lobectomy under the care of Dr. Wilkins ([facility]) in 6/2021.  Final pathology reveled non small cell carcinoma, favor solid predominant adenocarcinoma. Negative nodes.  Pt. did not need adjuvant chemo or RT. \par \par MRI Liver 10/6/2021- The indeterminate lesion seen within the left hepatic lobe on prior studies has slightly enlarged and must be reviewed with suspicion for neoplasia, particularly metastatic disease. \par \par PET CT 10/11/2021- IMP: 1) Since FDG PET 4/2021, interval right middle lobectomy, no new FDG avid suspicious lung nodule.   2) Stable background FDG uptake within the enlarging indeterminant segment 4a liver lesion, better characterized on MRI dated 10/6/2021, differential diagnosis includes low grade neoplastic versus early metastatic disease, less likely aggressive malignancy;  3) No evidence of FDG avid yessenia or distant metastases.  \par \par Pt. states she had a mammo/sonogram done on 10/14/2021.  Will obtain reports \par \par At this time patient denies breast pain, palpable masses, skin changes and/or nipple discharge.  Chronic back pain, followed by Dr. Davies ([facility]). \par \par Internist: Dr. Gonzalez

## 2021-10-18 NOTE — ASSESSMENT
[FreeTextEntry1] : IMP: \par JEANETTE - hx of infiltrating ductal carcinoma 2003 s/p right lumpectomy \par On no further adjuvant\par Mammogram/sonogram 10/2020- BIRADS 1\par -Breast MRI 4/2021- There is a 0.9 cm enhancing mass in the right midlung which is suspicious. Recommend chest CT.  There is a 1.5 cm enhancing mass in the right lobe of the liver, incompletely characterized.  Recommend CT.  No MRI findings of malignancy in either breast. BIRADS 2 \par -Pt. is s/p right lobectomy under the care of Dr. Wilkins ([facility]) in 6/2021.  Final pathology reveled non small cell carcinoma, favor solid predominant adenocarcinoma. Negative nodes.  Pt. did not need adjuvant chemo or RT. \par -MRI Liver 10/6/2021- The indeterminate lesion seen within the left hepatic lobe on prior studies has slightly enlarged and must be reviewed with suspicion for neoplasia, particularly metastatic disease. \par -PET CT 10/11/2021- IMP: 1) Since FDG PET 4/2021, interval right middle lobectomy, no new FDG avid suspicious lung nodule.   2) Stable background FDG uptake within the enlarging indeterminant segment 4a liver lesion, better characterized on MRI dated 10/6/2021, differential diagnosis includes low grade neoplastic versus early metastatic disease, less likely aggressive malignancy;  3) No evidence of FDG avid yessenia or distant metastases.  \par \par Plan:\par US guided liver biopsy \par RTO 10-14 days after biopsy to discuss pathology results \par [facility]\par Blood work as per Dr. Benavides \par

## 2021-10-18 NOTE — REASON FOR VISIT
[Follow-Up Visit] : a follow-up visit for [Breast Cancer] : breast cancer [Spouse] : spouse [FreeTextEntry2] : liver mass

## 2021-10-18 NOTE — PHYSICAL EXAM
[Normal] : supple, no neck mass and thyroid not enlarged [Normal Supraclavicular Lymph Nodes] : normal supraclavicular lymph nodes [Normal Axillary Lymph Nodes] : normal axillary lymph nodes [Normal] : oriented to person, place and time, with appropriate affect [de-identified] : healed right breast incision; no masses or nipple discharge \par  [de-identified] : No masses or tenderness [FreeTextEntry1] : deferred

## 2021-10-19 LAB
BASOPHILS # BLD AUTO: 0.05 K/UL
BASOPHILS NFR BLD AUTO: 1.1 %
EOSINOPHIL # BLD AUTO: 0.06 K/UL
EOSINOPHIL NFR BLD AUTO: 1.4 %
HCT VFR BLD CALC: 37.4 %
HGB BLD-MCNC: 12.1 G/DL
IMM GRANULOCYTES NFR BLD AUTO: 0.2 %
LYMPHOCYTES # BLD AUTO: 0.93 K/UL
LYMPHOCYTES NFR BLD AUTO: 21.2 %
MAN DIFF?: NORMAL
MCHC RBC-ENTMCNC: 29.8 PG
MCHC RBC-ENTMCNC: 32.4 GM/DL
MCV RBC AUTO: 92.1 FL
MONOCYTES # BLD AUTO: 0.5 K/UL
MONOCYTES NFR BLD AUTO: 11.4 %
NEUTROPHILS # BLD AUTO: 2.83 K/UL
NEUTROPHILS NFR BLD AUTO: 64.7 %
PLATELET # BLD AUTO: 329 K/UL
RBC # BLD: 4.06 M/UL
RBC # FLD: 13.7 %
WBC # FLD AUTO: 4.38 K/UL

## 2021-10-20 LAB
BUN SERPL-MCNC: 13 MG/DL
CREAT SERPL-MCNC: 0.63 MG/DL
INR PPP: 1 RATIO
PT BLD: 12 SEC

## 2021-10-23 ENCOUNTER — APPOINTMENT (OUTPATIENT)
Dept: DISASTER EMERGENCY | Facility: CLINIC | Age: 76
End: 2021-10-23

## 2021-10-23 ENCOUNTER — LABORATORY RESULT (OUTPATIENT)
Age: 76
End: 2021-10-23

## 2021-10-26 ENCOUNTER — RESULT REVIEW (OUTPATIENT)
Age: 76
End: 2021-10-26

## 2021-10-26 ENCOUNTER — APPOINTMENT (OUTPATIENT)
Dept: ULTRASOUND IMAGING | Facility: IMAGING CENTER | Age: 76
End: 2021-10-26
Payer: MEDICARE

## 2021-10-26 ENCOUNTER — OUTPATIENT (OUTPATIENT)
Dept: OUTPATIENT SERVICES | Facility: HOSPITAL | Age: 76
LOS: 1 days | End: 2021-10-26
Payer: MEDICARE

## 2021-10-26 DIAGNOSIS — Z98.890 OTHER SPECIFIED POSTPROCEDURAL STATES: Chronic | ICD-10-CM

## 2021-10-26 DIAGNOSIS — Z98.89 OTHER SPECIFIED POSTPROCEDURAL STATES: Chronic | ICD-10-CM

## 2021-10-26 DIAGNOSIS — Z90.710 ACQUIRED ABSENCE OF BOTH CERVIX AND UTERUS: Chronic | ICD-10-CM

## 2021-10-26 DIAGNOSIS — R16.0 HEPATOMEGALY, NOT ELSEWHERE CLASSIFIED: ICD-10-CM

## 2021-10-26 DIAGNOSIS — Z90.49 ACQUIRED ABSENCE OF OTHER SPECIFIED PARTS OF DIGESTIVE TRACT: Chronic | ICD-10-CM

## 2021-10-26 PROCEDURE — 47000 NEEDLE BIOPSY OF LIVER PERQ: CPT

## 2021-10-26 PROCEDURE — 88341 IMHCHEM/IMCYTCHM EA ADD ANTB: CPT | Mod: 26

## 2021-10-26 PROCEDURE — 76942 ECHO GUIDE FOR BIOPSY: CPT | Mod: 26

## 2021-10-26 PROCEDURE — 88307 TISSUE EXAM BY PATHOLOGIST: CPT | Mod: 26

## 2021-10-26 PROCEDURE — 88342 IMHCHEM/IMCYTCHM 1ST ANTB: CPT | Mod: 26

## 2021-10-26 PROCEDURE — 76942 ECHO GUIDE FOR BIOPSY: CPT

## 2021-10-26 PROCEDURE — 88341 IMHCHEM/IMCYTCHM EA ADD ANTB: CPT

## 2021-10-26 PROCEDURE — 88307 TISSUE EXAM BY PATHOLOGIST: CPT

## 2021-10-26 PROCEDURE — 88360 TUMOR IMMUNOHISTOCHEM/MANUAL: CPT | Mod: 26

## 2021-10-26 PROCEDURE — 88360 TUMOR IMMUNOHISTOCHEM/MANUAL: CPT

## 2021-11-04 ENCOUNTER — APPOINTMENT (OUTPATIENT)
Dept: SURGICAL ONCOLOGY | Facility: CLINIC | Age: 76
End: 2021-11-04
Payer: MEDICARE

## 2021-11-04 VITALS
WEIGHT: 123 LBS | HEART RATE: 77 BPM | HEIGHT: 61 IN | TEMPERATURE: 96.1 F | BODY MASS INDEX: 23.22 KG/M2 | OXYGEN SATURATION: 97 % | SYSTOLIC BLOOD PRESSURE: 133 MMHG | DIASTOLIC BLOOD PRESSURE: 80 MMHG | RESPIRATION RATE: 16 BRPM

## 2021-11-04 PROCEDURE — 99214 OFFICE O/P EST MOD 30 MIN: CPT

## 2021-11-04 NOTE — ASSESSMENT
[FreeTextEntry1] : IMP: \par JEANETTE - hx of infiltrating ductal carcinoma 2003 s/p right lumpectomy \par On no further adjuvant\par Mammogram/sonogram 10/2020- BIRADS 1\par -Breast MRI 4/2021- There is a 0.9 cm enhancing mass in the right midlung which is suspicious. Recommend chest CT.  There is a 1.5 cm enhancing mass in the right lobe of the liver, incompletely characterized.  Recommend CT.  No MRI findings of malignancy in either breast. BIRADS 2 \par -Pt. is s/p right lobectomy under the care of Dr. Wilkins ([facility]X) in 6/2021.  Final pathology reveled non small cell carcinoma, favor solid predominant adenocarcinoma. Negative nodes.  Pt. did not need adjuvant chemo or RT. \par -MRI Liver 10/6/2021- The indeterminate lesion seen within the left hepatic lobe on prior studies has slightly enlarged and must be reviewed with suspicion for neoplasia, particularly metastatic disease. \par Colonoscopy in October was negative.\par -PET CT 10/11/2021- IMP: 1) Since FDG PET 4/2021, interval right middle lobectomy, no new FDG avid suspicious lung nodule.   2) Stable background FDG uptake within the enlarging indeterminant segment 4a liver lesion, better characterized on MRI dated 10/6/2021, differential diagnosis includes low grade neoplastic versus early metastatic disease, less likely aggressive malignancy;  3) No evidence of FDG avid yessenia or distant metastases.  \par - s/p ultrasound guided liver biopsy 10/26/21. Final Path: Neuroendocrine tumor \par \par Plan:\par GI evaluation for possible primary neuroendocrine tumor\par \par RTO 3 months \par \par

## 2021-11-04 NOTE — PHYSICAL EXAM
[Normal] : supple, no neck mass and thyroid not enlarged [Normal Neck Lymph Nodes] : normal neck lymph nodes  [Normal Supraclavicular Lymph Nodes] : normal supraclavicular lymph nodes [Normal Axillary Lymph Nodes] : normal axillary lymph nodes [Normal] : oriented to person, place and time, with appropriate affect [de-identified] : No masses or tenderness

## 2021-11-04 NOTE — CONSULT LETTER
[Dear  ___] : Dear  [unfilled], [Courtesy Letter:] : I had the pleasure of seeing your patient, [unfilled], in my office today. [Please see my note below.] : Please see my note below. [Consult Closing:] : Thank you very much for allowing me to participate in the care of this patient.  If you have any questions, please do not hesitate to contact me. [FreeTextEntry1] : She will be contacting both you and Dr. Duong for further evaluation .\par \par If no other area is seen, she would benefit from a liver resection. [FreeTextEntry3] : Jose Sandhu MD FACS\par Chief of Surgical Oncology\par \par  [DrMurtaza  ___] : Dr. CARPENTER [DrMurtaza ___] : Dr. CARPENTER

## 2021-11-04 NOTE — HISTORY OF PRESENT ILLNESS
[de-identified] : Tonya Siddiqui is a 76 year old female who presents to the office for a follow up visit. She is s/p ultrasound guided liver biopsy of segment 4A on  10/26/21. Final Path: Neuroendocrine tumor  \par \par Past Hx: \par s/p RIGHT Breast Lumpectomy on 10/8/2003 for moderately differentiated infiltrating ductal carcinoma, followed by Radiation Therapy.  Completed course of Tamoxifen and Arimidex.\par \par BRCA NEGATIVE\par \par Mammogram/sonogram 10/2020- BIRADS 1 \par Bilateral Breast MRI 6/2020- BIRADS 1\par Blood work done by Dr. Benavides (Med[facility]On[facility]).  CA27.29 done in 11/2020 (13.8 U/mL)\par Colonoscopy 8/2019- Diverticulosis of the descending colon.  Internal hemorrhoids. \par \par Breast MRI 4/2021- There is a 0.9 cm enhancing mass in the right midlung which is suspicious. Recommend chest CT.  There is a 1.5 cm enhancing mass in the right lobe of the liver, incompletely characterized.  Recommend CT.  No MRI findings of malignancy in either breast. BIRADS 2 \par \par MRI Liver 4/2021- Left hepatic 13 mm mildly T2 hyperintense lesion may represent a hemangioma but is indeterminate. Malignant lesion is not excluded. Continued f/u is recommended. \par \par Pt. is s/p right lobectomy under the care of Dr. Wilkins ([facility]) in 6/2021.  Final pathology reveled non small cell carcinoma, favor solid predominant adenocarcinoma. Negative nodes.  Pt. did not need adjuvant chemo or RT. \par \par MRI Liver 10/6/2021- The indeterminate lesion seen within the left hepatic lobe on prior studies has slightly enlarged and must be reviewed with suspicion for neoplasia, particularly metastatic disease. \par \par PET CT 10/11/2021- IMP: 1) Since FDG PET 4/2021, interval right middle lobectomy, no new FDG avid suspicious lung nodule.   2) Stable background FDG uptake within the enlarging indeterminant segment 4a liver lesion, better characterized on MRI dated 10/6/2021, differential diagnosis includes low grade neoplastic versus early metastatic disease, less likely aggressive malignancy;  3) No evidence of FDG avid yessenia or distant metastases.  \par \par Pt. states she had a mammo/sonogram done on 10/14/2021. BIRADS 1\par \par At this time patient complaint of right axillary and breast pain, No palpable masses, skin changes and/or nipple discharge.  Chronic back pain, followed by Dr. Davies ([facility]). \par \par Internist: Dr. Gonzalez

## 2021-11-09 ENCOUNTER — NON-APPOINTMENT (OUTPATIENT)
Age: 76
End: 2021-11-09

## 2021-11-10 ENCOUNTER — APPOINTMENT (OUTPATIENT)
Dept: OTOLARYNGOLOGY | Facility: CLINIC | Age: 76
End: 2021-11-10
Payer: MEDICARE

## 2021-11-10 VITALS
HEIGHT: 61 IN | WEIGHT: 123 LBS | DIASTOLIC BLOOD PRESSURE: 77 MMHG | SYSTOLIC BLOOD PRESSURE: 136 MMHG | HEART RATE: 91 BPM | TEMPERATURE: 96.1 F | BODY MASS INDEX: 23.22 KG/M2

## 2021-11-10 PROCEDURE — 31231 NASAL ENDOSCOPY DX: CPT

## 2021-11-10 PROCEDURE — 99213 OFFICE O/P EST LOW 20 MIN: CPT | Mod: 25

## 2021-11-10 NOTE — PROCEDURE
[de-identified] : Reason for the procedure-throat symptoms not adequately evaluated by mirror exam\par After informed verbal consent is obtained. \par The fiberoptic laryngoscope #3 is passed via the  nasal cavity. There is no adenoid hypertrophy of the nasopharynx.  \par The hypopharynx is clear with no lesions or masses. \par The vocal cords are clear intact, within normal limits and mobile bilaterally with  \par evidence of posterior laryngeal erythema and edema and erythema of the arytenoids.\par \par Tongue Base-wnl\par Larynx-wnl\par Hypopharynx-wnl\par tongue base, \par vallecular-wnl\par epiglottis-wnl\par subglottis-wnl\par posterior pharyngeal wall-wnl\par \par true vocal cords-wnl\par arytenoids-erythema and edema\par false vocal cords-wnl\par ventricles-wnl \par pyriform sinus-wnl no pooling\par aryepiglottic folds-wnl\par \par

## 2021-11-10 NOTE — HISTORY OF PRESENT ILLNESS
[No] : patient does not have a  history of radiation therapy [Vertigo] : vertigo [Dizziness] : dizziness [Nasal Congestion] : nasal congestion [de-identified] : 75 yo female with hx of vertigo presents for follow up. She was recently diagnosed with liver cancer. Dizziness is controlled. SHe takes Flonase and nasal saline with Allegra for her allergies and nasal congestion which helps. She recently had a colonoscopy and while being under anaesthesia she aspirated and needed to be suctioned, was told that she has scratch in her throat. She wants to check to see that the scratch resolved.  Pt has no ear pain, ear drainage, hearing loss, tinnitus, nasal discharge, epistaxis, sinus infections, facial pain, facial pressure, throat pain, dysphagia or fevers\par \par \par \par \par  [Anxiety] : no anxiety [Headache] : no headache [Hearing Loss] : no hearing loss [Recurrent Otitis Media] : no recurrent otitis media [Otitis Media with Effusion] : no otitis media with effusion [Presbycusis] : no presbycusis [Congenital Ear Malformation] : no congenital ear malformation [Meniere Disease] : no Meniere disease [Otosclerosis] : no otosclerosis [Perilymphatic Fistula] : no perilymphatic fistula [Hypertension] : no hypertension [Loud Noise Exposure] : no history of loud noise exposure [Smoking] : no smoking [Early Onset Hearing Loss] : no early onset hearing loss [Stroke] : no stroke [Facial Pain] : no facial pain [Facial Pressure] : no facial pressure [Diplopia] : no diplopia [Ear Fullness] : no ear fullness [Allergic Rhinitis] : no allergic rhinitis [Maxillary Tooth Infection] : no maxillary tooth infection [Septal Deviation] : no septal deviation [Environmental Allergies] : no environmental allergies [Seasonal Allergies] : no seasonal allergies [Environmental Allergens] : no environmental allergens [Adenoidectomy] : no adenoidectomy [Nasal FB Removal] : no nasal foreign body removal [Allergies] : no allergies [Asthma] : no asthma [Neck Mass] : no neck mass [Neck Pain] : no neck pain [Chills] : no chills [Cold Intolerance] : no cold intolerance [Cough] : no cough [Fatigue] : no fatigue [Heat Intolerance] : no heat intolerance [Hyperthyroidism] : no hyperthyroidism [Sialadenitis] : no sialadenitis [Hodgkin Disease] : no hodgkin disease [Non-Hodgkin Lymphoma] : no non-hodgkin lymphoma [Tobacco Use] : no tobacco use [Alcohol Use] : no alcohol use [Graves Disease] : no graves disease [Thyroid Cancer] : no thyroid cancer

## 2021-11-10 NOTE — END OF VISIT
[Time Spent: ___ minutes] : I have spent [unfilled] minutes of time on the encounter. [FreeTextEntry3] : I personally saw and examined MARIFER BENTLEY in detail. I spoke to JNAEEN Bronson regarding the assessment and plan of care. I reviewed the above assessment and plan of care, and agree. I have made changes in changes in the body of the note where appropriate.I personally reviewed the HPI, PMH, FH, SH, ROS and medications/allergies. I have spoken to JANEEN Bronson regarding the history and have personally determined the assessment and plan of care, and documented this myself. I was present and participated in all key portions of the encounter and all procedures noted above. I have made changes in the body of the note where appropriate.\par \par Attesting Faculty: See Attending Signature Below \par \par \par

## 2021-11-10 NOTE — PHYSICAL EXAM
[] : septum deviated to the left [Midline] : trachea located in midline position [Normal] : gait was normal [de-identified] : B/L tenderness  [Hearing Loss Right Only] : normal [Hearing Loss Left Only] : normal [Nystagmus] : ~T no ~M nystagmus was seen [Fukuda Step Test] : Fukuda Step Test was Negative

## 2021-11-10 NOTE — DATA REVIEWED
[de-identified] : bilat SNHL\par sl L>R\par Hearing Test performed to evaluate the extent of hearing loss and  to explain pt's symptoms\par

## 2021-11-22 ENCOUNTER — APPOINTMENT (OUTPATIENT)
Dept: CT IMAGING | Facility: IMAGING CENTER | Age: 76
End: 2021-11-22

## 2021-11-23 ENCOUNTER — RESULT REVIEW (OUTPATIENT)
Age: 76
End: 2021-11-23

## 2021-11-23 ENCOUNTER — OUTPATIENT (OUTPATIENT)
Dept: OUTPATIENT SERVICES | Facility: HOSPITAL | Age: 76
LOS: 1 days | End: 2021-11-23
Payer: MEDICARE

## 2021-11-23 ENCOUNTER — APPOINTMENT (OUTPATIENT)
Dept: RADIOLOGY | Facility: IMAGING CENTER | Age: 76
End: 2021-11-23
Payer: MEDICARE

## 2021-11-23 DIAGNOSIS — Z98.89 OTHER SPECIFIED POSTPROCEDURAL STATES: Chronic | ICD-10-CM

## 2021-11-23 DIAGNOSIS — Z98.890 OTHER SPECIFIED POSTPROCEDURAL STATES: Chronic | ICD-10-CM

## 2021-11-23 DIAGNOSIS — Z90.710 ACQUIRED ABSENCE OF BOTH CERVIX AND UTERUS: Chronic | ICD-10-CM

## 2021-11-23 DIAGNOSIS — Z00.8 ENCOUNTER FOR OTHER GENERAL EXAMINATION: ICD-10-CM

## 2021-11-23 DIAGNOSIS — Z90.49 ACQUIRED ABSENCE OF OTHER SPECIFIED PARTS OF DIGESTIVE TRACT: Chronic | ICD-10-CM

## 2021-11-23 PROCEDURE — 74018 RADEX ABDOMEN 1 VIEW: CPT

## 2021-11-23 PROCEDURE — 74018 RADEX ABDOMEN 1 VIEW: CPT | Mod: 26

## 2021-11-24 ENCOUNTER — APPOINTMENT (OUTPATIENT)
Dept: THORACIC SURGERY | Facility: CLINIC | Age: 76
End: 2021-11-24
Payer: MEDICARE

## 2021-11-24 VITALS
HEIGHT: 61 IN | SYSTOLIC BLOOD PRESSURE: 159 MMHG | WEIGHT: 120 LBS | RESPIRATION RATE: 18 BRPM | OXYGEN SATURATION: 97 % | BODY MASS INDEX: 22.66 KG/M2 | DIASTOLIC BLOOD PRESSURE: 92 MMHG | HEART RATE: 87 BPM

## 2021-11-24 PROCEDURE — 99213 OFFICE O/P EST LOW 20 MIN: CPT

## 2021-11-24 RX ORDER — GABAPENTIN 300 MG/1
300 CAPSULE ORAL 3 TIMES DAILY
Qty: 90 | Refills: 0 | Status: DISCONTINUED | COMMUNITY
Start: 2021-08-10 | End: 2021-11-24

## 2021-11-29 NOTE — ASSESSMENT
[FreeTextEntry1] : Ms. MARIFER BENTLEY, 76 year old female, former smoker (2 PPD x 3 years; Quit 1970), w/ hx of breast cancer s/p lumpectomy and radiation 2003, who was undergoing routine evaluation of her breast that revealed 0.9 cm RML LUNG LESION on MRI Breast 4/13/2021, with mildly avidity confirmed on PET/CT 4/16/21. The patient is otherwise in good health. Last year (2020) she was hospitalized at [facility] for chest pain and underwent a negative coronary angiogram. \par \par 6/3/2021-Now, s/p Bronchoscopy, right video thoracoscopy, right middle lobectomy, and mediastinal lymph node sampling on 6/3/21; path of RML revealed poorly differentiated non small cell carcinoma, favor solid predominant adenocarcinoma, 0.9 cm, 0/12 LN negative, pT1aN0. \par \par CT Chest on 7/29/21: \par - s/p RMLobectomy \par - Mild nodular opacity along the chain sutures; Cluster of nodular opacities within the anterobasilar RLL. (series 2, image 69).\par -  Slight increase in subpleural opacity within the superior segment of the RLL (series 2, image 24-29)\par - Unchanged 5 mm basilar RLL nodule (series 2, image 82)\par - Unchanged 6 mm ROBERT nodule along the fissure (series 2, image 18)\par \par Of note, she has several cysts of her liver which have been known for a number of years. October, 2021, follow up MRI Live demonstrating indeterminate lesion seen within the left hepatic lobe on prior studies has slightly enlarged and must be reviewed with suspicion for neoplasia, particularly metastatic disease. \par \par S/p ultrasound guided liver biopsy of segment 4A on 10/26/21. Final Path: Neuroendocrine tumor \par \par PET CT 10/11/2021:\par - No new FDG avid suspicious lung nodule \par - Stable background FDG uptake within the enlarging indeterminate segment 4a liver lesion. \par - No evidence of FDG avid yessenia or distant metastases\par \par I have independently reviewed the medical records and imaging at the time of this office consultation. PET/CT with stable, post op findings in chest. No new suspicious lung nodule. Recommendation for patient to return to clinic in 6 months with repeat CT chest, no contrast, to re-evaluate stability. \par \par Recommendations reviewed with patient during this office visit, and all questions answered; Patient instructed on the importance of follow up and verbalizes understanding.\par \par I personally performed the services described in the documentation, reviewed the documentation recorded by the scribe in my presence and it accurately and completely records my words and actions.\par \par I, LUDIVINA Aparicio, am scribing for and the presence of HILTON Dowd, the following sections HISTORY OF PRESENT ILLNESS, PAST MEDICAL/FAMILY/SOCIAL HISTORY; REVIEW OF SYSTEMS; VITAL SIGNS; PHYSICAL EXAM; DISPOSITION.\par \par \par \par \par \par \par \par \par \par

## 2021-11-29 NOTE — HISTORY OF PRESENT ILLNESS
[FreeTextEntry1] : Ms. MARIFER BENTLEY, 76 year old female, former smoker (2 PPD x 3 years; Quit 1970), w/ hx of breast cancer s/p lumpectomy and radiation 2003, who was undergoing routine evaluation of her breast that revealed 0.9 cm RML LUNG LESION on MRI Breast 4/13/2021, with mildly avidity confirmed on PET/CT 4/16/21. The patient is otherwise in good health. Last year (2020) she was hospitalized at [facility] for chest pain and underwent a negative coronary angiogram. \par \par 6/3/2021-Now, s/p Bronchoscopy, right video thoracoscopy, right middle lobectomy, and mediastinal lymph node sampling on 6/3/21; path of RML revealed poorly differentiated non small cell carcinoma, favor solid predominant adenocarcinoma, 0.9 cm, 0/12 LN negative, pT1aN0. \par \par CT Chest on 7/29/21: \par - s/p RMLobectomy \par - Mild nodular opacity along the chain sutures; Cluster of nodular opacities within the anterobasilar RLL. (series 2, image 69).\par -  Slight increase in subpleural opacity within the superior segment of the RLL (series 2, image 24-29)\par - Unchanged 5 mm basilar RLL nodule (series 2, image 82)\par - Unchanged 6 mm ROBERT nodule along the fissure (series 2, image 18)\par \par Of note, she has several cysts of her liver which have been known for a number of years. October, 2021, follow up MRI Liver demonstrating indeterminate lesion seen within the left hepatic lobe on prior studies has slightly enlarged and must be reviewed with suspicion for neoplasia, particularly metastatic disease. \par \par s/p Ultrasound guided liver biopsy of segment 4A on 10/26/21. Final Path: Neuroendocrine tumor \par \par PET CT 10/11/2021:\par - No new FDG avid suspicious lung nodule \par - Stable background FDG uptake within the enlarging indeterminate segment 4a liver lesion. \par - No evidence of FDG avid yessenia or distant metastases\par \par Patient presents today for follow up. Currently undergoing further GI workup regarding liver lesion. Today, denies worsening SOB, chest pain, cough, hemoptysis, fever, chills, night sweats, lightheadedness or dizziness.\par

## 2021-11-29 NOTE — CONSULT LETTER
[Dear  ___] : Dear  [unfilled], [Courtesy Letter:] : I had the pleasure of seeing your patient, [unfilled], in my office today. [Please see my note below.] : Please see my note below. [Consult Closing:] : Thank you very much for allowing me to participate in the care of this patient.  If you have any questions, please do not hesitate to contact me. [Sincerely,] : Sincerely, [FreeTextEntry2] : Dr. Jean Elizabeth (PULM/ref)\par Dr. Jordin Lebron MD (ONC)\par Dr. Tawny Cedillo (PCP) \par \par  [FreeTextEntry3] : Vernon Wilkins MD, FACS \par , Division of Thoracic Surgery \par [facility] \par Chief, Thoracic Surgery \par [facility] \par Department of Cardiovascular & Thoracic Surgery \par  \par [facility] [employer] [facility]\par

## 2021-11-29 NOTE — PHYSICAL EXAM
[Respiration, Rhythm And Depth] : normal respiratory rhythm and effort [Exaggerated Use Of Accessory Muscles For Inspiration] : no accessory muscle use [Auscultation Breath Sounds / Voice Sounds] : lungs were clear to auscultation bilaterally [Examination Of The Chest] : the chest was normal in appearance [Chest Visual Inspection Thoracic Asymmetry] : no chest asymmetry [Diminished Respiratory Excursion] : normal chest expansion [Cervical Lymph Nodes Enlarged Posterior Bilaterally] : posterior cervical [Cervical Lymph Nodes Enlarged Anterior Bilaterally] : anterior cervical [Supraclavicular Lymph Nodes Enlarged Bilaterally] : supraclavicular [Abnormal Walk] : normal gait [Skin Color & Pigmentation] : normal skin color and pigmentation [Skin Turgor] : normal skin turgor [] : no rash [Oriented To Time, Place, And Person] : oriented to person, place, and time [FreeTextEntry1] : Well approximated, well healed surgical incision

## 2021-12-14 PROCEDURE — 88341 IMHCHEM/IMCYTCHM EA ADD ANTB: CPT | Mod: 26,59

## 2021-12-14 PROCEDURE — 88360 TUMOR IMMUNOHISTOCHEM/MANUAL: CPT | Mod: 26

## 2021-12-14 PROCEDURE — 88342 IMHCHEM/IMCYTCHM 1ST ANTB: CPT | Mod: 26,59

## 2021-12-21 ENCOUNTER — NON-APPOINTMENT (OUTPATIENT)
Age: 76
End: 2021-12-21

## 2021-12-22 ENCOUNTER — APPOINTMENT (OUTPATIENT)
Dept: THORACIC SURGERY | Facility: CLINIC | Age: 76
End: 2021-12-22
Payer: MEDICARE

## 2021-12-22 PROCEDURE — 99024 POSTOP FOLLOW-UP VISIT: CPT

## 2021-12-25 NOTE — CONSULT LETTER
[Dear  ___] : Dear  [unfilled], [Courtesy Letter:] : I had the pleasure of seeing your patient, [unfilled], in my office today. [Please see my note below.] : Please see my note below. [Consult Closing:] : Thank you very much for allowing me to participate in the care of this patient.  If you have any questions, please do not hesitate to contact me. [Sincerely,] : Sincerely, [FreeTextEntry2] : Dr. Jean Elizabeth (PULM/ref)\par Dr. Jordin Lebron MD (ONC)\par Dr. Tawny Cedillo (PCP) \par \par  [FreeTextEntry3] : Vernon Wilkins MD, FACS \par , Division of Thoracic Surgery \par [facility] \par Chief, Thoracic Surgery \par [facility] \par Department of Cardiovascular & Thoracic Surgery \par  \par [facility] [employer] [facility]\par

## 2021-12-25 NOTE — HISTORY OF PRESENT ILLNESS
[Home] : at home, [unfilled] , at the time of the visit. [Medical Office: ([facility])___] : at the medical office located in  [Verbal consent obtained from patient] : the patient, [unfilled] [FreeTextEntry1] : Ms. MARIFER BENTLEY, 76 year old female, former smoker (2 PPD x 3 years; Quit 1970), w/ hx of breast cancer s/p lumpectomy and radiation 2003, who was undergoing routine evaluation of her breast that revealed 0.9 cm RML LUNG LESION on MRI Breast 4/13/2021, with mildly avidity confirmed on PET/CT 4/16/21. The patient is otherwise in good health. Last year (2020) she was hospitalized at [facility] for chest pain and underwent a negative coronary angiogram. \par \par 6/3/2021-Now, s/p Bronchoscopy, right video thoracoscopy, right middle lobectomy, and mediastinal lymph node sampling on 6/3/21; path of RML revealed poorly differentiated non small cell carcinoma, favor solid predominant adenocarcinoma, 0.9 cm, 0/12 LN negative, pT1aN0. \par \par CT Chest on 7/29/21: \par - s/p RMLobectomy \par - Mild nodular opacity along the chain sutures; Cluster of nodular opacities within the anterobasilar RLL. (series 2, image 69).\par -  Slight increase in subpleural opacity within the superior segment of the RLL (series 2, image 24-29)\par - Unchanged 5 mm basilar RLL nodule (series 2, image 82)\par - Unchanged 6 mm ROBERT nodule along the fissure (series 2, image 18)\par \par Of note, she has several cysts of her liver which have been known for a number of years. October, 2021, follow up MRI Liver demonstrating indeterminate lesion seen within the left hepatic lobe on prior studies has slightly enlarged and must be reviewed with suspicion for neoplasia, particularly metastatic disease. \par \par s/p Ultrasound guided liver biopsy of segment 4A on 10/26/21. Final Path: Neuroendocrine tumor \par \par PET CT 10/11/2021:\par - No new FDG avid suspicious lung nodule \par - Stable background FDG uptake within the enlarging indeterminate segment 4a liver lesion. \par - No evidence of FDG avid yessenia or distant metastases\par \par DOTATATE Scan on 12/2/2021: \par - Markedly increased DOTATATE uptake at the gastric fundus, SUV 12, involving 3 cm segment.\par - Heterogeneous distribution of the DOTATATE uptake in the liver with multiple punctate foci, SUV ranges between 7-8. The largest hypdense lesion measuring 2 x 1.8 cm is located at segment 8 (Image 151)\par - Focally increased DOTATATE uptake at the T1 vertebral body without correlating lytic or sclerotic lesion. (Image 88; SUV 2.2)\par \par Patient presents today for follow up via TEB visit to discuss DOTATATE scan and Tumor Board recommendations.

## 2021-12-25 NOTE — ASSESSMENT
[FreeTextEntry1] : Ms. MARIFER BENTLEY, 76 year old female, former smoker (2 PPD x 3 years; Quit 1970), w/ hx of breast cancer s/p lumpectomy and radiation 2003, who was undergoing routine evaluation of her breast that revealed 0.9 cm RML LUNG LESION on MRI Breast 4/13/2021, with mildly avidity confirmed on PET/CT 4/16/21. \par \par 6/3/2021-Now, s/p Bronchoscopy, right video thoracoscopy, right middle lobectomy, and mediastinal lymph node sampling on 6/3/21; path of RML revealed poorly differentiated non small cell carcinoma, favor solid predominant adenocarcinoma, 0.9 cm, 0/12 LN negative, pT1aN0. \par \par Of note, she has several cysts of her liver which have been known for a number of years. October, 2021, follow up MRI Live demonstrating indeterminate lesion seen within the left hepatic lobe on prior studies has slightly enlarged and must be reviewed with suspicion for neoplasia, particularly metastatic disease. \par \par S/p ultrasound guided liver biopsy of segment 4A on 10/26/21. Final Path: Neuroendocrine tumor \par \par I have independently reviewed the medical records and imaging at the time of this office consultation. Dr. Benavides present via telephone. Case reviewed with patient. 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 Plan for IV Somatostatin Analogue. Further liver and spine workup to be conducted by Dr. Benavides. Recommendation for patient to return to clinic in 6 months with repeat CT chest, no contrast, to evaluate post op stability. Discussed the review of the first pathology with the patient and her oncologist and explained that the tumor in the lung was neuroendocrine-atypical carcinoid on review. \par \par Recommendations reviewed with patient during this office visit, and all questions answered; Patient instructed on the importance of follow up and verbalizes understanding.\par \par I personally performed the services described in the documentation, reviewed the documentation recorded by the scribe in my presence and it accurately and completely records my words and actions.\par \par ART, TANVI AparicioC, am scribing for and the presence of HILTON Dowd, the following sections HISTORY OF PRESENT ILLNESS, PAST MEDICAL/FAMILY/SOCIAL HISTORY; REVIEW OF SYSTEMS; VITAL SIGNS; PHYSICAL EXAM; DISPOSITION.\par \par \par \par \par \par \par \par \par \par \par \par

## 2022-01-03 ENCOUNTER — NON-APPOINTMENT (OUTPATIENT)
Age: 77
End: 2022-01-03

## 2022-01-20 ENCOUNTER — APPOINTMENT (OUTPATIENT)
Dept: MRI IMAGING | Facility: CLINIC | Age: 77
End: 2022-01-20
Payer: MEDICARE

## 2022-01-20 ENCOUNTER — OUTPATIENT (OUTPATIENT)
Dept: OUTPATIENT SERVICES | Facility: HOSPITAL | Age: 77
LOS: 1 days | End: 2022-01-20
Payer: MEDICARE

## 2022-01-20 DIAGNOSIS — Z98.89 OTHER SPECIFIED POSTPROCEDURAL STATES: Chronic | ICD-10-CM

## 2022-01-20 DIAGNOSIS — Z90.710 ACQUIRED ABSENCE OF BOTH CERVIX AND UTERUS: Chronic | ICD-10-CM

## 2022-01-20 DIAGNOSIS — C34.90 MALIGNANT NEOPLASM OF UNSPECIFIED PART OF UNSPECIFIED BRONCHUS OR LUNG: ICD-10-CM

## 2022-01-20 DIAGNOSIS — Z90.49 ACQUIRED ABSENCE OF OTHER SPECIFIED PARTS OF DIGESTIVE TRACT: Chronic | ICD-10-CM

## 2022-01-20 DIAGNOSIS — Z98.890 OTHER SPECIFIED POSTPROCEDURAL STATES: Chronic | ICD-10-CM

## 2022-01-20 PROCEDURE — 72146 MRI CHEST SPINE W/O DYE: CPT

## 2022-01-20 PROCEDURE — 72148 MRI LUMBAR SPINE W/O DYE: CPT | Mod: 26,MH

## 2022-01-20 PROCEDURE — 72148 MRI LUMBAR SPINE W/O DYE: CPT | Mod: MH

## 2022-01-20 PROCEDURE — 72146 MRI CHEST SPINE W/O DYE: CPT | Mod: 26,MH

## 2022-02-01 ENCOUNTER — NON-APPOINTMENT (OUTPATIENT)
Age: 77
End: 2022-02-01

## 2022-02-02 NOTE — END OF VISIT
[FreeTextEntry3] : I personally saw and examined MARIFER BENTLEY in detail. I spoke to JANEEN Bronson regarding the assessment and plan of care. I reviewed the above assessment and plan of care, and agree. I have made changes in changes in the body of the note where appropriate.I personally reviewed the HPI, PMH, FH, SH, ROS and medications/allergies. I have spoken to JANEEN Bronson regarding the history and have personally determined the assessment and plan of care, and documented this myself. I was present and participated in all key portions of the encounter and all procedures noted above. I have made changes in the body of the note where appropriate.\par \par Attesting Faculty: See Attending Signature Below \par \par \par  [Time Spent: ___ minutes] : I have spent [unfilled] minutes of time on the encounter. Previously Declined (within the last year)

## 2022-02-16 ENCOUNTER — APPOINTMENT (OUTPATIENT)
Dept: PHYSICAL MEDICINE AND REHAB | Facility: CLINIC | Age: 77
End: 2022-02-16

## 2022-02-22 ENCOUNTER — APPOINTMENT (OUTPATIENT)
Dept: PHYSICAL MEDICINE AND REHAB | Facility: CLINIC | Age: 77
End: 2022-02-22
Payer: MEDICARE

## 2022-02-22 VITALS
SYSTOLIC BLOOD PRESSURE: 147 MMHG | HEART RATE: 81 BPM | DIASTOLIC BLOOD PRESSURE: 76 MMHG | TEMPERATURE: 97.8 F | OXYGEN SATURATION: 99 %

## 2022-02-22 PROCEDURE — 99214 OFFICE O/P EST MOD 30 MIN: CPT

## 2022-02-22 NOTE — ASSESSMENT
[FreeTextEntry1] : \par 77 year old female pmh breast ca, thoracotomy for resection of lung nodule, here for follow up with focal thoracic pain last visit, today main complaint is upper back and neck pain due to muscle spasm.\par -does not wish to take steroids due to osteoporosis\par -heat prn away from spine\par -on benzos per pmd for anxiety, states they also help her muscle spasm and help her sleep\par -receiving oncology treatment, ?possible mets to spine per her oncologist, interpreted as possible hemangioma on MRI.  \par -She also had T5 compression fracture, healing, reports pain improved\par -would like to start PT for muscle spasm\par -also would like to try acupuncture. referral given \par -follow up in 4-6 weeks, can consider trigger point injection if interested\par I spent a total of 30 minutes on this encounter including documentation, face to face time, care coordination and reviewing prior records.\par

## 2022-02-22 NOTE — HISTORY OF PRESENT ILLNESS
[FreeTextEntry1] : \par 78 yo F pmh breast ca dx 2003 s/p lumpectomy and radiation, found to have lung lesion and is s/p Bronchoscopy, right video thoracoscopy, right middle lobectomy, and mediastinal lymph node sampling on 6/3/21.She had residual radiating R sided chest pain after the surgery which has improved. She is now off gabapentin.  She has compression fracture T5, also being treated by Dr. Benavides for neuroendocrine tumor with disease in the spine, imaging (MRI with contrast) from 9/25 described atypical hemangioma and scattered hemangiomas, unclear if this is possibly metastatic disease. She is receiving IV treatment for this and liver disease per Dr. Benavides.  She continues to see her chiropractor for myofascial treatment, and reports intermitted muscle spasm in L upper back radiating to the neck and head.  MRI c spine from 2021 reviewed.  She denies pain in upper extremities, numbness, tingling or weakness. She also reports history of TMJ pain and feels spasm in jaw may be related to neck pain. \par She sees a chiropractor for neck pain which she has had for many years. She most recently tried PT about 5 years ago, she has tried trigger point injections in the past but states they stopped working so she discontinued.\par  \par She reports T 5 pain is improving, does not interfere with activity but she has discomfort with bending at times.  \par \par She takes tylenol on average once a day.  \par She ambulates without device and is accompanied by her  today

## 2022-02-22 NOTE — PHYSICAL EXAM
[FreeTextEntry1] : GEN: AAOx3, NAD.\par PSYCH: Normal mood and affect. Responds appropriately to commands.\par EYES: Sclerae Anicteric. No discharge. EOMI.\par RESP: Breathing unlabored.\par CV: Radial pulses 2+ and equal. No varicosities noted.\par EXT: No C/C/E.\par LYMPH: No supraclavicular, anterior or posterior cervical lymphadenopathy appreciated.\par GAIT: Non antalgic, normal reciprocating heel to toe.\par INSP: No Atrophy, Bony Deformity, Swelling. Shoulder height symmetric. Normal Thoracic Kyphosis.\par Cervical ROM: restricted rotation to the right\par PALP: + mild focal tenderness thoracic spine, + hypertonicity and tenderness L upper trapezius\par STRENGTH: 5/5 bilateral shoulder abductors, internal rotation, external rotation, elbow flexors, elbow extensors, wrist extensors, long finger flexors, hand intrinsics, .\par TONE: Normal, No clonus.\par SENSATION: Normal to light touch in the bilateral upper extremities. \par \par  \par

## 2022-02-28 ENCOUNTER — APPOINTMENT (OUTPATIENT)
Dept: OTOLARYNGOLOGY | Facility: CLINIC | Age: 77
End: 2022-02-28
Payer: MEDICARE

## 2022-02-28 VITALS
DIASTOLIC BLOOD PRESSURE: 74 MMHG | HEIGHT: 61 IN | TEMPERATURE: 98 F | HEART RATE: 80 BPM | WEIGHT: 119 LBS | SYSTOLIC BLOOD PRESSURE: 131 MMHG | BODY MASS INDEX: 22.47 KG/M2

## 2022-02-28 DIAGNOSIS — R42 DIZZINESS AND GIDDINESS: ICD-10-CM

## 2022-02-28 PROCEDURE — 99213 OFFICE O/P EST LOW 20 MIN: CPT

## 2022-02-28 NOTE — DATA REVIEWED
[de-identified] : bilat SNHL\par sl L>R\par Hearing Test performed to evaluate the extent of hearing loss and  to explain pt's symptoms\par

## 2022-02-28 NOTE — ASSESSMENT
[FreeTextEntry1] : Patient with hx of vertigo present for routine visit. Uses Flonase and allegra for PND and nasal congestion. Was recently diagnosed with liver cancer. \par \par DNS and Nasal congestion:\par -continue nasal saline and Allegra daily  \par -Steam humidification advised\par \par f/u 6 months or prn

## 2022-02-28 NOTE — HISTORY OF PRESENT ILLNESS
[No] : patient does not have a  history of radiation therapy [Vertigo] : vertigo [Dizziness] : dizziness [Nasal Congestion] : nasal congestion [de-identified] : 75 yo female with hx of vertigo presents for follow up. She was recently diagnosed with liver cancer. Dizziness is controlled. SHe takes Flonase and nasal saline with Allegra for her allergies and nasal congestion which helps. She recently had a colonoscopy and while being under anaesthesia she aspirated and needed to be suctioned, was told that she has scratch in her throat. She wants to check to see that the scratch resolved.  Pt has no ear pain, ear drainage, hearing loss, tinnitus, nasal discharge, epistaxis, sinus infections, facial pain, facial pressure, throat pain, dysphagia or fevers\par \par \par \par \par  [FreeTextEntry1] : Patient presents for follow up. States she continues to have nasal congestion, uses saline prn with relief. She complains she gets tingling sensation in her tongue when she is with nasal congestion.  [Anxiety] : no anxiety [Hearing Loss] : no hearing loss [Headache] : no headache [Recurrent Otitis Media] : no recurrent otitis media [Otitis Media with Effusion] : no otitis media with effusion [Presbycusis] : no presbycusis [Congenital Ear Malformation] : no congenital ear malformation [Meniere Disease] : no Meniere disease [Otosclerosis] : no otosclerosis [Perilymphatic Fistula] : no perilymphatic fistula [Hypertension] : no hypertension [Loud Noise Exposure] : no history of loud noise exposure [Smoking] : no smoking [Early Onset Hearing Loss] : no early onset hearing loss [Stroke] : no stroke [Facial Pain] : no facial pain [Facial Pressure] : no facial pressure [Diplopia] : no diplopia [Ear Fullness] : no ear fullness [Allergic Rhinitis] : no allergic rhinitis [Maxillary Tooth Infection] : no maxillary tooth infection [Septal Deviation] : no septal deviation [Environmental Allergies] : no environmental allergies [Seasonal Allergies] : no seasonal allergies [Environmental Allergens] : no environmental allergens [Adenoidectomy] : no adenoidectomy [Nasal FB Removal] : no nasal foreign body removal [Allergies] : no allergies [Asthma] : no asthma [Neck Mass] : no neck mass [Neck Pain] : no neck pain [Chills] : no chills [Cold Intolerance] : no cold intolerance [Cough] : no cough [Fatigue] : no fatigue [Heat Intolerance] : no heat intolerance [Hyperthyroidism] : no hyperthyroidism [Sialadenitis] : no sialadenitis [Hodgkin Disease] : no hodgkin disease [Non-Hodgkin Lymphoma] : no non-hodgkin lymphoma [Tobacco Use] : no tobacco use [Alcohol Use] : no alcohol use [Graves Disease] : no graves disease [Thyroid Cancer] : no thyroid cancer

## 2022-02-28 NOTE — PHYSICAL EXAM
[] : septum deviated to the left [Midline] : trachea located in midline position [Normal] : gait was normal [de-identified] : B/L tenderness  [Hearing Loss Right Only] : normal [Hearing Loss Left Only] : normal [Nystagmus] : ~T no ~M nystagmus was seen [Fukuda Step Test] : Fukuda Step Test was Negative

## 2022-03-08 ENCOUNTER — TRANSCRIPTION ENCOUNTER (OUTPATIENT)
Age: 77
End: 2022-03-08

## 2022-03-14 ENCOUNTER — APPOINTMENT (OUTPATIENT)
Dept: SURGICAL ONCOLOGY | Facility: CLINIC | Age: 77
End: 2022-03-14
Payer: MEDICARE

## 2022-03-14 VITALS
HEIGHT: 61 IN | HEART RATE: 92 BPM | DIASTOLIC BLOOD PRESSURE: 81 MMHG | OXYGEN SATURATION: 92 % | BODY MASS INDEX: 22.47 KG/M2 | TEMPERATURE: 97.6 F | WEIGHT: 119 LBS | SYSTOLIC BLOOD PRESSURE: 155 MMHG | RESPIRATION RATE: 16 BRPM

## 2022-03-14 PROCEDURE — 99214 OFFICE O/P EST MOD 30 MIN: CPT

## 2022-03-14 NOTE — ASSESSMENT
[FreeTextEntry1] : IMP: \par JEANETTE - hx of infiltrating ductal carcinoma 2003 s/p right lumpectomy \par On no further adjuvant\par Mammogram/sonogram 10/2020- BIRADS 1\par -Breast MRI 4/2021- There is a 0.9 cm enhancing mass in the right midlung which is suspicious. Recommend chest CT.  There is a 1.5 cm enhancing mass in the right lobe of the liver, incompletely characterized.  Recommend CT.  No MRI findings of malignancy in either breast. BIRADS 2 \par -Pt. is s/p right lobectomy under the care of Dr. Wilkins ([facility]) in 6/2021.  Final pathology reveled non small cell carcinoma, favor solid predominant adenocarcinoma. Negative nodes.  Pt. did not need adjuvant chemo or RT. \par -MRI Liver 10/6/2021- The indeterminate lesion seen within the left hepatic lobe on prior studies has slightly enlarged and must be reviewed with suspicion for neoplasia, particularly metastatic disease. \par Colonoscopy in October was negative.\par -PET CT 10/11/2021- IMP: 1) Since FDG PET 4/2021, interval right middle lobectomy, no new FDG avid suspicious lung nodule.   2) Stable background FDG uptake within the enlarging indeterminant segment 4a liver lesion, better characterized on MRI dated 10/6/2021, differential diagnosis includes low grade neoplastic versus early metastatic disease, less likely aggressive malignancy;  3) No evidence of FDG avid yessenia or distant metastases.  \par - s/p ultrasound guided liver biopsy 10/26/21. Final Path: Neuroendocrine tumor \par -B/L mammo/sono 10/2022- BIRADS 1 \par \par Plan:\par Breast MRI April, 2022\par RTO 3 months \par Mammogram/ sonogram  October, 2022\par \par

## 2022-03-14 NOTE — PHYSICAL EXAM
[Normal] : supple, no neck mass and thyroid not enlarged [Normal Neck Lymph Nodes] : normal neck lymph nodes  [Normal Supraclavicular Lymph Nodes] : normal supraclavicular lymph nodes [Normal Axillary Lymph Nodes] : normal axillary lymph nodes [Normal] : oriented to person, place and time, with appropriate affect [de-identified] : Fibrocystic changes but no masses or discharge

## 2022-03-14 NOTE — HISTORY OF PRESENT ILLNESS
[de-identified] : Tonya Siddiqui is a 76 year old female who presents to the office for a follow up visit. She is s/p ultrasound guided liver biopsy of segment 4A on  10/26/21. Final Path: Neuroendocrine tumor  \par \par Past Hx: \par s/p RIGHT Breast Lumpectomy on 10/8/2003 for moderately differentiated infiltrating ductal carcinoma, followed by Radiation Therapy.  Completed course of Tamoxifen and Arimidex.\par \par BRCA NEGATIVE\par \par Mammogram/sonogram 10/2020- BIRADS 1 \par Bilateral Breast MRI 6/2020- BIRADS 1\par Blood work done by Dr. Benavides (Med-Onc).  CA27.29 done in 11/2020 (13.8 U/mL)\par Colonoscopy 8/2019- Diverticulosis of the descending colon.  Internal hemorrhoids. \par \par Breast MRI 4/2021- There is a 0.9 cm enhancing mass in the right midlung which is suspicious. Recommend chest CT.  There is a 1.5 cm enhancing mass in the right lobe of the liver, incompletely characterized.  Recommend CT.  No MRI findings of malignancy in either breast. BIRADS 2 \par \par MRI Liver 4/2021- Left hepatic 13 mm mildly T2 hyperintense lesion may represent a hemangioma but is indeterminate. Malignant lesion is not excluded. Continued f/u is recommended. \par \par Pt. is s/p right lobectomy under the care of Dr. Wilkins ([facility]) in 6/2021.  Final pathology reveled non small cell carcinoma, favor solid predominant adenocarcinoma. Negative nodes.  Pt. did not need adjuvant chemo or RT. \par \par MRI Liver 10/6/2021- The indeterminate lesion seen within the left hepatic lobe on prior studies has slightly enlarged and must be reviewed with suspicion for neoplasia, particularly metastatic disease. \par \par PET CT 10/11/2021- IMP: 1) Since FDG PET 4/2021, interval right middle lobectomy, no new FDG avid suspicious lung nodule.   2) Stable background FDG uptake within the enlarging indeterminant segment 4a liver lesion, better characterized on MRI dated 10/6/2021, differential diagnosis includes low grade neoplastic versus early metastatic disease, less likely aggressive malignancy;  3) No evidence of FDG avid yessenia or distant metastases.  \par \par Pt. states she had a mammo/sonogram done on 10/14/2021. BIRADS 1\par \par At this time patient complaint of breast pain, No palpable masses, skin changes and/or nipple discharge.  Chronic back pain, followed by Dr. Davies ([facility]). \par \par Internist: Dr. Gonzalez

## 2022-03-14 NOTE — REASON FOR VISIT
[Follow-Up Visit] : a follow-up visit for [Breast Cancer] : breast cancer [FreeTextEntry2] : liver mass

## 2022-03-28 ENCOUNTER — APPOINTMENT (OUTPATIENT)
Dept: PHYSICAL MEDICINE AND REHAB | Facility: CLINIC | Age: 77
End: 2022-03-28
Payer: MEDICARE

## 2022-03-28 VITALS
TEMPERATURE: 95.9 F | DIASTOLIC BLOOD PRESSURE: 76 MMHG | OXYGEN SATURATION: 99 % | HEART RATE: 79 BPM | SYSTOLIC BLOOD PRESSURE: 137 MMHG

## 2022-03-28 PROCEDURE — 99214 OFFICE O/P EST MOD 30 MIN: CPT

## 2022-03-28 NOTE — ASSESSMENT
[FreeTextEntry1] : 77 year old female pmh breast ca, thoracotomy for resection of lung nodule, here for follow up with focal thoracic pain , upper back and neck pain due to muscle spasm and neuropathic R sided pain\par -She also had T5 compression fracture, healing, reports pain improved- also improved since starting acupuncture\par - continue PT for muscle spasm, please include stretching and home exercise program\par - recommend restarting gabapentin for R sided breast sensitivity and shooting pain. MRI breast pending\par - consider trigger point R upper trap if not improving\par I spent a total of 30 minutes on this encounter including documentation, face to face time, care coordination and reviewing prior records.\par  \par

## 2022-03-28 NOTE — HISTORY OF PRESENT ILLNESS
[FreeTextEntry1] : Tonya Borden is a 76 yo F pmh breast ca dx 2003 s/p lumpectomy and radiation, found to have lung lesion and is s/p Bronchoscopy, right video thoracoscopy, right middle lobectomy, and mediastinal lymph node sampling on 6/3/21. She had residual radiating R sided chest pain after the surgery which has improved but is still bothering her, it had improved while on gabapentin but she had discontinued medication. Pain is described as discomfort and sensitivity, reaches 7-8/10 at times, however at times is only 1-2/10.  She has compression fracture T5, also being treated by Dr. Benavides for neuroendocrine tumor with disease in the spine, imaging (MRI with contrast) from 9/25 described atypical hemangioma and scattered hemangiomas, unclear if this is possibly metastatic disease. She is receiving IV treatment for this and liver disease per Dr. Benavides.  \par  \par She reports flexeril in the past but states she didn't tolerate it. \par Thoracic back pain was 5/10 with bending, since starting acupuncture pain improved to 2-3/10.   She continues PT program for myofascial pain.\par She reports sensitivity R chest to R nipple.\par planning for MRI April 18 for breast, planning for follow up liver imaging over the summer. \par She takes tylenol on average once a day. \par

## 2022-03-28 NOTE — PHYSICAL EXAM
[FreeTextEntry1] : GEN: AAOx3, NAD.\par PSYCH: Normal mood and affect. Responds appropriately to commands.\par EYES: Sclerae Anicteric. No discharge. EOMI.\par RESP: Breathing unlabored.\par CV: Radial pulses 2+ and equal. No varicosities noted.\par EXT: No C/C/E.\par LYMPH: No supraclavicular, anterior or posterior cervical lymphadenopathy appreciated.\par GAIT: Non antalgic, normal reciprocating heel to toe.\par INSP: No Atrophy, Bony Deformity, Swelling. Shoulder height symmetric. Normal Thoracic Kyphosis.\par Cervical ROM: restricted rotation to the right\par Shoulder ROM: intact b/l\par PALP: + mild focal tenderness thoracic spine, + hypertonicity and tenderness b/l upper trapezius\par STRENGTH: 5/5 bilateral shoulder abductors, internal rotation, external rotation, elbow flexors, elbow extensors, wrist extensors, long finger flexors, hand intrinsics, .\par TONE: Normal, No clonus.\par SENSATION: Normal to light touch in the bilateral upper extremities. \par

## 2022-03-29 ENCOUNTER — APPOINTMENT (OUTPATIENT)
Dept: OBGYN | Facility: CLINIC | Age: 77
End: 2022-03-29
Payer: MEDICARE

## 2022-03-29 DIAGNOSIS — N89.8 OTHER SPECIFIED NONINFLAMMATORY DISORDERS OF VAGINA: ICD-10-CM

## 2022-03-29 PROCEDURE — 99212 OFFICE O/P EST SF 10 MIN: CPT

## 2022-03-29 RX ORDER — FLUOROMETHOLONE 1 MG/ML
0.1 SOLUTION/ DROPS OPHTHALMIC
Qty: 5 | Refills: 0 | Status: ACTIVE | COMMUNITY
Start: 2021-12-16

## 2022-03-29 RX ORDER — LORAZEPAM 0.5 MG/1
0.5 TABLET ORAL
Qty: 120 | Refills: 0 | Status: ACTIVE | COMMUNITY
Start: 2022-01-17

## 2022-03-29 NOTE — PHYSICAL EXAM
[Vulvar Atrophy] : vulvar atrophy [Labia Majora] : normal [Labia Minora] : normal [Urethral Caruncle] : urethral caruncle [Normal] : normal [Uterine Adnexae] : normal [FreeTextEntry4] : nl abl vaginal discharge

## 2022-03-29 NOTE — HISTORY OF PRESENT ILLNESS
[FreeTextEntry1] : 77 yr old with hx br cancer and metastatic neuroendocrine tumor c/o of vaginal irritation and saw with a mirror a red spot vaginally.  No odor, or itching or urinary c/o.  No VB or vag discharge

## 2022-03-29 NOTE — PLAN
[FreeTextEntry1] : vaginal atrophy and urethral caruncle\par \par 1. OTC lubricants/balmex/aquaphor\par 2. not candidate for topical estrogen

## 2022-03-31 ENCOUNTER — NON-APPOINTMENT (OUTPATIENT)
Age: 77
End: 2022-03-31

## 2022-04-11 ENCOUNTER — APPOINTMENT (OUTPATIENT)
Dept: PHYSICAL MEDICINE AND REHAB | Facility: CLINIC | Age: 77
End: 2022-04-11
Payer: MEDICARE

## 2022-04-11 VITALS
OXYGEN SATURATION: 97 % | DIASTOLIC BLOOD PRESSURE: 68 MMHG | TEMPERATURE: 97.7 F | HEART RATE: 88 BPM | SYSTOLIC BLOOD PRESSURE: 116 MMHG

## 2022-04-11 PROCEDURE — 99214 OFFICE O/P EST MOD 30 MIN: CPT

## 2022-04-11 NOTE — HISTORY OF PRESENT ILLNESS
[FreeTextEntry1] : Tonya Borden is a 76 yo F pmh breast ca dx 2003 s/p lumpectomy and radiation, found to have lung lesion and is s/p Bronchoscopy, right video thoracoscopy, right middle lobectomy, and mediastinal lymph node sampling on 6/3/21. \par \par She had residual radiating R sided chest pain after the surgery which has improved with gabapentin.  Pain had been described as shooting, R sided, up to 8/10 pain, but she has not noticed the pain since she increased the gabapentin to 300mg at bedtime last week. \par \par She has compression fracture T5, also being treated by Dr. Benavides for neuroendocrine tumor with disease in the spine, imaging (MRI with contrast) from 9/25 described atypical hemangioma and scattered hemangiomas and is being treated by Dr. Benavides. \par \par She had been attending PT with improvement in back pain and also had improvement with acupuncture.  \par Friday she fell down 3 steps while leaving PT. She felt her shoe got stuck on the stairs and caused her to fall. States she was in pain, had difficulty getting up, and went to [facility].  X rays hip/pelvis and lumbar spine negative for fracture.  Pain is currently 2/10, however worsens with movement, improves with tramadol and tylenol, improves with ice.  She is ambulating with Rollator, states pain is severe when she does not take tramadol and has been worsening. Denies bowel or bladder incontinence.

## 2022-04-11 NOTE — ASSESSMENT
[FreeTextEntry1] : 77 year old female pmh breast ca, thoracotomy for resection of lung nodule, here for follow up with focal thoracic pain , upper back and neck pain due to muscle spasm and neuropathic R sided pain\par -referred to ED to return for further imaging(prior x rays negative), however pain worsening and having difficulty with ambulation reported.  patient appears comfortable during exam however symptoms may me masked by tramadol.\par -hold PT pending further imaging\par -continue tramadol prn\par -continue gabapentin 300mg at bedtime.\par -phone call appointment scheduled for follow  \par I spent a total of 30 minutes on this encounter including documentation, face to face time, care coordination and reviewing prior records.\par  \par  \par \par

## 2022-04-11 NOTE — PHYSICAL EXAM
[FreeTextEntry1] : GEN: AAOx3, NAD.\par PSYCH: Normal mood and affect. Responds appropriately to commands.\par EYES: Sclerae Anicteric. No discharge. EOMI.\par RESP: Breathing unlabored.\par CV: Radial pulses 2+ and equal\par EXT: No C/C/E.\par LYMPH: No supraclavicular, anterior or posterior cervical lymphadenopathy appreciated.\par GAIT: antalgic with rollator  \par PALP: + mild focal tenderness thoracic spine, + hypertonicity and tenderness b/l upper trapezius\par STRENGTH: 5/5 b/l ue, 4+/5 b/l hip flexion, otherwise 5/5 KE PF/DF however with pain with movmeent.\par SENSATION: Normal to light touch in the bilateral upper extremities.   no ttp lumbar spine or greater trochanter b/l, no si joint tenderness or new spinal tenderness (however patient on medication for pain)\par SLRT negative b/l\par ESTHELA and FADIR intact.\par  \par \par  \par

## 2022-04-13 ENCOUNTER — APPOINTMENT (OUTPATIENT)
Dept: PHYSICAL MEDICINE AND REHAB | Facility: CLINIC | Age: 77
End: 2022-04-13
Payer: MEDICARE

## 2022-04-13 PROCEDURE — 99447 NTRPROF PH1/NTRNET/EHR 11-20: CPT

## 2022-04-18 ENCOUNTER — APPOINTMENT (OUTPATIENT)
Dept: ORTHOPEDIC SURGERY | Facility: CLINIC | Age: 77
End: 2022-04-18
Payer: MEDICARE

## 2022-04-18 VITALS
SYSTOLIC BLOOD PRESSURE: 139 MMHG | WEIGHT: 117 LBS | DIASTOLIC BLOOD PRESSURE: 58 MMHG | HEART RATE: 94 BPM | BODY MASS INDEX: 22.09 KG/M2 | HEIGHT: 61 IN

## 2022-04-18 PROCEDURE — 99204 OFFICE O/P NEW MOD 45 MIN: CPT | Mod: 57

## 2022-04-18 PROCEDURE — 22310 CLOSED TX VERT FX W/O MANJ: CPT

## 2022-04-18 NOTE — PHYSICAL EXAM
[de-identified] : Lumbar Physical Exam\par \par Slow, antalgic gait but the patient was able to walk without any assistive devices approximately 10 feet in the exam room\par Sagittal balance within normal limits\par \par Reflexes\par Patellar - normal\par Gastroc - normal\par Clonus - No\par \par Hip Exam - Normal\par \par Straight leg raise - none\par \par Pulses - 2+ dp/pt\par \par Range of motion - normal\par \par Sensation \par Sensation intact to light touch in L1, L2, L3, L4, L5 and S1 dermatomes bilaterally\par \par Motor\par 	IP	Quad	HS	TA	Gastroc	EHL\par Right	5/5	5/5	5/5	5/5	5/5	5/5\par Left	5/5	5/5	5/5	5/5	5/5	5/5 [de-identified] : Lumbar MRI reviewed\par L1 compression fracture\par Less than 50% vertebral body height loss\par No areas of critical central stenosis\par Orders of critical foraminal stenosis

## 2022-04-18 NOTE — ASSESSMENT
[FreeTextEntry1] : I had a long discussion with the patient regards to her treatment plan and diagnosis.  I think she will do well with conservative management.  We will continue with encouraging ambulation.  We will give her an LSO brace.  She can take tizanidine and Tylenol for pain control.  I will have her follow-up in 3 to 4 weeks for repeat clinical evaluation.  I did encourage her to reach out to me anytime if her symptoms worsen or change in any way.

## 2022-04-18 NOTE — HISTORY OF PRESENT ILLNESS
[de-identified] : This is a 77-year-old female that is here today for evaluation of his low back pain.  On April 8 the patient had a fall.  Since that time she has been dealing with back pain.  She had 2 trips to the emergency room.  Eventually she had an MRI which did show an L1 compression fracture.  She denies any radiating type pain down her legs.  She does state that her pain has improved as compared to her first day of injury.  She denies any bowel bladder issues.  She denies any saddle anesthesia.  Of note the patient does have a complex history in relation to cancer as dictated in other portions of her medical chart.

## 2022-04-19 ENCOUNTER — NON-APPOINTMENT (OUTPATIENT)
Age: 77
End: 2022-04-19

## 2022-04-19 ENCOUNTER — TRANSCRIPTION ENCOUNTER (OUTPATIENT)
Age: 77
End: 2022-04-19

## 2022-04-20 ENCOUNTER — NON-APPOINTMENT (OUTPATIENT)
Age: 77
End: 2022-04-20

## 2022-04-20 ENCOUNTER — TRANSCRIPTION ENCOUNTER (OUTPATIENT)
Age: 77
End: 2022-04-20

## 2022-04-22 ENCOUNTER — TRANSCRIPTION ENCOUNTER (OUTPATIENT)
Age: 77
End: 2022-04-22

## 2022-04-25 ENCOUNTER — APPOINTMENT (OUTPATIENT)
Dept: SURGICAL ONCOLOGY | Facility: CLINIC | Age: 77
End: 2022-04-25
Payer: MEDICARE

## 2022-04-25 VITALS
RESPIRATION RATE: 17 BRPM | BODY MASS INDEX: 21.77 KG/M2 | TEMPERATURE: 97.9 F | WEIGHT: 115.31 LBS | DIASTOLIC BLOOD PRESSURE: 83 MMHG | HEART RATE: 92 BPM | HEIGHT: 61 IN | SYSTOLIC BLOOD PRESSURE: 143 MMHG

## 2022-04-25 PROCEDURE — 99214 OFFICE O/P EST MOD 30 MIN: CPT

## 2022-04-25 NOTE — HISTORY OF PRESENT ILLNESS
[de-identified] : Tonya Siddiqui is a 77 year old female who presents to the office for a follow up visit. \par \par Past Hx: \par s/p RIGHT Breast Lumpectomy on 10/8/2003 for moderately differentiated infiltrating ductal carcinoma, followed by Radiation Therapy.  Completed course of Tamoxifen and Arimidex.\par \par BRCA NEGATIVE\par \par Mammogram/sonogram 10/2020- BIRADS 1 \par Bilateral Breast MRI 6/2020- BIRADS 1\par Blood work done by Dr. Benavides (Med[facility]Onc).  CA27.29 done in 11/2020 (13.8 U/mL)\par Colonoscopy 8/2019- Diverticulosis of the descending colon.  Internal hemorrhoids. \par \par Breast MRI 4/2021- There is a 0.9 cm enhancing mass in the right midlung which is suspicious. Recommend chest CT.  There is a 1.5 cm enhancing mass in the right lobe of the liver, incompletely characterized.  Recommend CT.  No MRI findings of malignancy in either breast. BIRADS 2 \par \par MRI Liver 4/2021- Left hepatic 13 mm mildly T2 hyperintense lesion may represent a hemangioma but is indeterminate. Malignant lesion is not excluded. Continued f/u is recommended. \par \par Pt. is s/p right lobectomy under the care of Dr. Wilkins ([facility]) in 6/2021.  Final pathology reveled non small cell carcinoma, favor solid predominant adenocarcinoma. Negative nodes.  Pt. did not need adjuvant chemo or RT. \par \par MRI Liver 10/6/2021- The indeterminate lesion seen within the left hepatic lobe on prior studies has slightly enlarged and must be reviewed with suspicion for neoplasia, particularly metastatic disease. \par \par PET CT 10/11/2021- IMP: 1) Since FDG PET 4/2021, interval right middle lobectomy, no new FDG avid suspicious lung nodule.   2) Stable background FDG uptake within the enlarging indeterminant segment 4a liver lesion, better characterized on MRI dated 10/6/2021, differential diagnosis includes low grade neoplastic versus early metastatic disease, less likely aggressive malignancy;  3) No evidence of FDG avid yessenia or distant metastases.  \par \par She is s/p ultrasound guided liver biopsy of segment 4A on  10/26/21. Final Path: Neuroendocrine tumor  \par \par Pt. states she had a mammo/sonogram done on 10/14/2021. BIRADS 1\par \par Breast MRI 4/18/22- No evidence of malignancy.  BIRADS 1 \par \par Pt. is on Lanreotide under the care of Dr. Benavides.  Pt. states she recently fell down 3 stairs and has a L1 compression fracture. She is currently ambulating with a walker.  Pt. continues to experience right breast pain and now occasionally left nipple pain, unrelieved with Gabapentin.  Pt. is considering medical marijuana. Denies palpable breast masses, nipple discharge, skin dimpling or inversion. \par \par Internist: Dr. Gonzalez

## 2022-04-25 NOTE — REASON FOR VISIT
[Follow-Up Visit] : a follow-up visit for [Breast Cancer] : breast cancer [Spouse] : spouse [FreeTextEntry2] : liver mass (neuroendocrine tumor)

## 2022-04-25 NOTE — ASSESSMENT
[FreeTextEntry1] : IMP: \par JEANETTE - hx of infiltrating ductal carcinoma 2003 s/p right lumpectomy \par On no further adjuvant\par Mammogram/sonogram 10/2020- BIRADS 1\par -Breast MRI 4/2021- There is a 0.9 cm enhancing mass in the right midlung which is suspicious. Recommend chest CT.  There is a 1.5 cm enhancing mass in the right lobe of the liver, incompletely characterized.  Recommend CT.  No MRI findings of malignancy in either breast. BIRADS 2 \par -Pt. is s/p right lobectomy under the care of Dr. Wilkins ([facility]) in 6/2021.  Final pathology reveled non small cell carcinoma, favor solid predominant adenocarcinoma. Negative nodes.  Pt. did not need adjuvant chemo or RT. \par -MRI Liver 10/6/2021- The indeterminate lesion seen within the left hepatic lobe on prior studies has slightly enlarged and must be reviewed with suspicion for neoplasia, particularly metastatic disease. \par Colonoscopy in October was negative.\par -PET CT 10/11/2021- IMP: 1) Since FDG PET 4/2021, interval right middle lobectomy, no new FDG avid suspicious lung nodule.   2) Stable background FDG uptake within the enlarging indeterminant segment 4a liver lesion, better characterized on MRI dated 10/6/2021, differential diagnosis includes low grade neoplastic versus early metastatic disease, less likely aggressive malignancy;  3) No evidence of FDG avid yessenia or distant metastases.  \par - s/p ultrasound guided liver biopsy 10/26/21. Final Path: Neuroendocrine tumor - on Lanreotide \par -B/L mammo/sono 10/2021- BIRADS 1 \par -Breast MRI 4/18/22- No evidence of malignancy.  BIRADS 1 \par -Continues to experience right breast pain \par -S/p fall- has L1 compression fracture- ambulating with walker \par \par Plan:\par RTO q6 months \par Mammogram/ sonogram  October, 2022 (ordered)\par MRI of breast 4/2023\par Neuro-endocrine tumor management by Dr. Benavides\par \par

## 2022-04-25 NOTE — PHYSICAL EXAM
[Normal] : supple, no neck mass and thyroid not enlarged [Normal Supraclavicular Lymph Nodes] : normal supraclavicular lymph nodes [Normal Axillary Lymph Nodes] : normal axillary lymph nodes [Normal] : oriented to person, place and time, with appropriate affect [de-identified] : healed right breast incision; no masses or nipple discharge \par  [FreeTextEntry1] : deferred [de-identified] : ambulating with walker

## 2022-04-26 ENCOUNTER — TRANSCRIPTION ENCOUNTER (OUTPATIENT)
Age: 77
End: 2022-04-26

## 2022-04-29 ENCOUNTER — TRANSCRIPTION ENCOUNTER (OUTPATIENT)
Age: 77
End: 2022-04-29

## 2022-05-02 ENCOUNTER — TRANSCRIPTION ENCOUNTER (OUTPATIENT)
Age: 77
End: 2022-05-02

## 2022-05-06 ENCOUNTER — NON-APPOINTMENT (OUTPATIENT)
Age: 77
End: 2022-05-06

## 2022-05-09 ENCOUNTER — APPOINTMENT (OUTPATIENT)
Dept: ORTHOPEDIC SURGERY | Facility: CLINIC | Age: 77
End: 2022-05-09
Payer: MEDICARE

## 2022-05-09 VITALS — DIASTOLIC BLOOD PRESSURE: 82 MMHG | SYSTOLIC BLOOD PRESSURE: 136 MMHG

## 2022-05-09 PROCEDURE — 99024 POSTOP FOLLOW-UP VISIT: CPT

## 2022-05-09 PROCEDURE — 72110 X-RAY EXAM L-2 SPINE 4/>VWS: CPT

## 2022-05-09 NOTE — PHYSICAL EXAM
[de-identified] : Lumbar Physical Exam\par \par The patient's gait has dramatically improved as compared to her last visit, she is walking with a walker\par \par Reflexes\par Patellar - normal\par Gastroc - normal\par Clonus - No\par \par Hip Exam - Normal\par \par Straight leg raise - none\par \par Pulses - 2+ dp/pt\par \par Range of motion - normal\par \par Sensation \par Sensation intact to light touch in L1, L2, L3, L4, L5 and S1 dermatomes bilaterally\par \par Motor\par 	IP	Quad	HS	TA	Gastroc	EHL\par Right	5/5	5/5	5/5	5/5	5/5	5/5\par Left	5/5	5/5	5/5	5/5	5/5	5/5 [de-identified] : Lumbar MRI reviewed\par L1 compression fracture\par Less than 50% vertebral body height loss\par No areas of critical central stenosis\par Orders of critical foraminal stenosis

## 2022-05-09 NOTE — ASSESSMENT
[FreeTextEntry1] : Had a lengthy discussion with the patient about switching plan and diagnosis.  At this point I would like her to work with physical therapy to begin strengthening core and lumbar muscles.  We went over several questions the patient had in regards to treatment and activities that would help her back.  I will see her back in 2 to 3 months for repeat clinical evaluation.  Encouraged her to follow-up sooner if she has any new or worsening symptoms.

## 2022-05-09 NOTE — HISTORY OF PRESENT ILLNESS
[de-identified] : Today the patient states that she is still dealing with some back pain.  She denies any severe radicular type pain.  She denies any bowel bladder issues.  She denies any saddle anesthesia.  Overall she is pleased with her improvement in symptoms.  She is however still dealing with some residual discomfort.  She is wearing her LSO brace for comfort.\par \par 04/20/22\par This is a 77-year-old female that is here today for evaluation of his low back pain.  On April 8 the patient had a fall.  Since that time she has been dealing with back pain.  She had 2 trips to the emergency room.  Eventually she had an MRI which did show an L1 compression fracture.  She denies any radiating type pain down her legs.  She does state that her pain has improved as compared to her first day of injury.  She denies any bowel bladder issues.  She denies any saddle anesthesia.  Of note the patient does have a complex history in relation to cancer as dictated in other portions of her medical chart.

## 2022-05-12 ENCOUNTER — TRANSCRIPTION ENCOUNTER (OUTPATIENT)
Age: 77
End: 2022-05-12

## 2022-05-16 ENCOUNTER — APPOINTMENT (OUTPATIENT)
Dept: ULTRASOUND IMAGING | Facility: CLINIC | Age: 77
End: 2022-05-16
Payer: MEDICARE

## 2022-05-16 PROCEDURE — 76705 ECHO EXAM OF ABDOMEN: CPT

## 2022-06-06 ENCOUNTER — OUTPATIENT (OUTPATIENT)
Dept: OUTPATIENT SERVICES | Facility: HOSPITAL | Age: 77
LOS: 1 days | End: 2022-06-06
Payer: MEDICARE

## 2022-06-06 ENCOUNTER — APPOINTMENT (OUTPATIENT)
Dept: CT IMAGING | Facility: IMAGING CENTER | Age: 77
End: 2022-06-06
Payer: MEDICARE

## 2022-06-06 DIAGNOSIS — Z98.890 OTHER SPECIFIED POSTPROCEDURAL STATES: Chronic | ICD-10-CM

## 2022-06-06 DIAGNOSIS — Z90.710 ACQUIRED ABSENCE OF BOTH CERVIX AND UTERUS: Chronic | ICD-10-CM

## 2022-06-06 DIAGNOSIS — Z90.49 ACQUIRED ABSENCE OF OTHER SPECIFIED PARTS OF DIGESTIVE TRACT: Chronic | ICD-10-CM

## 2022-06-06 DIAGNOSIS — Z98.89 OTHER SPECIFIED POSTPROCEDURAL STATES: Chronic | ICD-10-CM

## 2022-06-06 DIAGNOSIS — C80.1 MALIGNANT (PRIMARY) NEOPLASM, UNSPECIFIED: ICD-10-CM

## 2022-06-06 PROCEDURE — G1004: CPT

## 2022-06-06 PROCEDURE — 71250 CT THORAX DX C-: CPT | Mod: ME

## 2022-06-06 PROCEDURE — 71250 CT THORAX DX C-: CPT | Mod: 26,ME

## 2022-06-14 ENCOUNTER — NON-APPOINTMENT (OUTPATIENT)
Age: 77
End: 2022-06-14

## 2022-06-15 ENCOUNTER — APPOINTMENT (OUTPATIENT)
Dept: THORACIC SURGERY | Facility: CLINIC | Age: 77
End: 2022-06-15
Payer: MEDICARE

## 2022-06-15 VITALS
RESPIRATION RATE: 17 BRPM | DIASTOLIC BLOOD PRESSURE: 77 MMHG | SYSTOLIC BLOOD PRESSURE: 160 MMHG | HEIGHT: 61 IN | BODY MASS INDEX: 22.47 KG/M2 | HEART RATE: 77 BPM | OXYGEN SATURATION: 99 % | WEIGHT: 119 LBS

## 2022-06-15 PROCEDURE — 99213 OFFICE O/P EST LOW 20 MIN: CPT

## 2022-06-15 NOTE — HISTORY OF PRESENT ILLNESS
[FreeTextEntry1] : Ms. MARIFER BENTLEY, 77 year old female, former smoker (2 PPD x 3 years; Quit 1970), w/ hx of breast cancer s/p lumpectomy and radiation 2003, who was undergoing routine evaluation of her breast that revealed 0.9 cm RML LUNG LESION on MRI Breast 4/13/2021, with mildly avidity confirmed on PET/CT 4/16/21. The patient is otherwise in good health. In 2020, she was hospitalized at [facility] for chest pain and underwent a negative coronary angiogram. \par \par 6/3/2021-Now, s/p Bronchoscopy, right video thoracoscopy, right middle lobectomy, and mediastinal lymph node sampling on 6/3/21; path of RML revealed poorly differentiated non small cell carcinoma, favor solid predominant adenocarcinoma, 0.9 cm, 0/12 LN negative, pT1aN0.  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   \par \par CT Chest on 7/29/21: \par - s/p RMLobectomy \par - Mild nodular opacity along the chain sutures; Cluster of nodular opacities within the anterobasilar RLL. (series 2, image 69).\par -  Slight increase in subpleural opacity within the superior segment of the RLL (series 2, image 24-29)\par - Unchanged 5 mm basilar RLL nodule (series 2, image 82)\par - Unchanged 6 mm ROBERT nodule along the fissure (series 2, image 18)\par \par Of note, she has several cysts of her liver which have been known for a number of years. October, 2021, follow up MRI Liver demonstrating indeterminate lesion seen within the left hepatic lobe on prior studies has slightly enlarged and must be reviewed with suspicion for neoplasia, particularly metastatic disease. \par \par s/p Ultrasound guided liver biopsy of segment 4A on 10/26/21. Final Path: Neuroendocrine tumor \par \par PET CT 10/11/2021:\par - No new FDG avid suspicious lung nodule \par - Stable background FDG uptake within the enlarging indeterminate segment 4a liver lesion. \par - No evidence of FDG avid yessenia or distant metastases\par \par DOTATATE Scan on 12/2/2021: \par - Markedly increased DOTATATE uptake at the gastric fundus, SUV 12, involving 3 cm segment.\par - Heterogeneous distribution of the DOTATATE uptake in the liver with multiple punctate foci, SUV ranges between 7-8. The largest hypdense lesion measuring 2 x 1.8 cm is located at segment 8 (Image 151)\par - Focally increased DOTATATE uptake at the T1 vertebral body without correlating lytic or sclerotic lesion. 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  Case reviewed with patient. 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 Plan for IV Somatostatin Analogue. Further liver and spine workup to be conducted by Dr. Benavides\par \par CT on 6/6/22: \par - Post op changes\par - Small diffusely calcified right lower lobe nodule is unchanged\par - Stable, noncalcified sub-5 mm RLL nodule image (83 series 2) and fissural nodule  (image 14)\par - Small hiatal hernia\par - 1.7 cm hypodense right hepatic lobe lesion stable since 7/29/2021. Subcentimeter hypodensities in the right and left hepatic lobes are stable in size.\par - Diffuse qualitative osteopenia. \par - New, mild L1 vertebral body compression fracture with mild retropulsion of fracture fragment posteriorly into the spinal canal. \par - Stable wedging deformity of the T5 vertebral body. There are surgical clips in the right axilla\par \par She presented today with the report of her MRI Liver w/wo contrast done on 6/7/22 (ordered by Dr. Benavides).  The report notes 2 hepatic metastases, a 2.0 cm mass hepatic segment 4 increased from 1.7 cm. and a 2.0 cm mass in hepatis segment 2/3 which is only faintly evident on prior study, increased from approximately 0.8 cm on the prior study.  She is scheduled to follow up with her oncologist Dr. Benavides tomorrow.  \par \par Patient presents today for 6 month follow up.  She continues to complain of mild pain radiating into the right breast, which she reports was once sharp, but is more dull and tender to touch at times.  She does not require pain medication and reports that the pain resolves spontaneously.  She reports that she fell on April 3rd and sustained L1 compression fracture for which she is receiving physical therapy.  She denies any fever, chills, cough, shortness of breath, chest pain, hemoptysis, or recent illness. \par \par \par

## 2022-06-15 NOTE — ASSESSMENT
[FreeTextEntry1] : Ms. MARIFER BENTLEY, 77 year old female, former smoker (2 PPD x 3 years; Quit 1970), w/ hx of breast cancer s/p lumpectomy and radiation 2003, who was undergoing routine evaluation of her breast that revealed 0.9 cm RML LUNG LESION on MRI Breast 4/13/2021, with mildly avidity confirmed on PET/CT 4/16/21. The patient is otherwise in good health. In 2020, she was hospitalized at [facility] for chest pain and underwent a negative coronary angiogram. \par \par 6/3/2021-Now, s/p Bronchoscopy, right video thoracoscopy, right middle lobectomy, and mediastinal lymph node sampling on 6/3/21; path of RML revealed poorly differentiated non small cell carcinoma, favor solid predominant adenocarcinoma, 0.9 cm, 0/12 LN negative, pT1aN0.  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   \par \par CT Chest on 7/29/21: \par - s/p RMLobectomy \par - Mild nodular opacity along the chain sutures; Cluster of nodular opacities within the anterobasilar RLL. (series 2, image 69).\par -  Slight increase in subpleural opacity within the superior segment of the RLL (series 2, image 24-29)\par - Unchanged 5 mm basilar RLL nodule (series 2, image 82)\par - Unchanged 6 mm ROBERT nodule along the fissure (series 2, image 18)\par \par Of note, she has several cysts of her liver which have been known for a number of years. October, 2021, follow up MRI Liver demonstrating indeterminate lesion seen within the left hepatic lobe on prior studies has slightly enlarged and must be reviewed with suspicion for neoplasia, particularly metastatic disease. \par \par s/p Ultrasound guided liver biopsy of segment 4A on 10/26/21. Final Path: Neuroendocrine tumor \par \par PET CT 10/11/2021:\par - No new FDG avid suspicious lung nodule \par - Stable background FDG uptake within the enlarging indeterminate segment 4a liver lesion. \par - No evidence of FDG avid yessenia or distant metastases\par \par DOTATATE Scan on 12/2/2021: \par - Markedly increased DOTATATE uptake at the gastric fundus, SUV 12, involving 3 cm segment.\par - Heterogeneous distribution of the DOTATATE uptake in the liver with multiple punctate foci, SUV ranges between 7-8. The largest hypdense lesion measuring 2 x 1.8 cm is located at segment 8 (Image 151)\par - Focally increased DOTATATE uptake at the T1 vertebral body without correlating lytic or sclerotic lesion. 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  Case reviewed with patient. 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 Plan for IV Somatostatin Analogue. Further liver and spine workup to be conducted by Dr. Benavides\par \par CT on 6/6/22: \par - Post op changes\par - Small diffusely calcified right lower lobe nodule is unchanged\par - Stable, noncalcified sub-5 mm RLL nodule image (83 series 2) and fissural nodule  (image 14)\par - Small hiatal hernia\par - 1.7 cm hypodense right hepatic lobe lesion stable since 7/29/2021. Subcentimeter hypodensities in the right and left hepatic lobes are stable in size.\par - Diffuse qualitative osteopenia. \par - New, mild L1 vertebral body compression fracture with mild retropulsion of fracture fragment posteriorly into the spinal canal. \par - Stable wedging deformity of the T5 vertebral body. There are surgical clips in the right axilla\par \par She presented today with the report of her MRI Liver w/wo contrast done on 6/7/22 (ordered by Dr. Benavides).  The report notes 2 hepatic metastases, a 2.0 cm mass hepatic segment 4 increased from 1.7 cm. and a 2.0 cm mass in hepatis segment 2/3 which is only faintly evident on prior study, increased from approximately 0.8 cm on the prior study.  She is scheduled to follow up with her oncologist Dr. Benavides tomorrow.  \par \par I have reviewed the patient's medical records and diagnostic images during the time of this office visit, and I have made the following recommendations: \par She presents now with enlarging liver mets-discussed options including ablation with patient, but she was advised to follow up her discussion with her oncologist, whom she is scheduled to see tomorrow. Her Chest CT revealed JEANETTE at present, and I have recommended that she follow up in 6months with noncontrast Chest CT.  All questions were answered, and she and her  are in agreement with the plan.\par \par I personally performed the services described in the documentation, reviewed the documentation recorded by the scribe in my presence and it accurately and completely records my words and actions.\par \par I, Malissa Leyva NP, am scribing for and the presence of Dr. Wilkins, the following sections HISTORY OF PRESENT ILLNESS, PAST MEDICAL/FAMILY/SOCIAL HISTORY; REVIEW OF SYSTEMS; VITAL SIGNS; PHYSICAL EXAM; DISPOSITION.\par

## 2022-06-15 NOTE — REVIEW OF SYSTEMS
[Joint Swelling] : no joint swelling [Joint Stiffness] : no joint stiffness [Limb Pain] : no limb pain [Limb Swelling] : no limb swelling [Negative] : Heme/Lymph [FreeTextEntry9] : back pain

## 2022-06-15 NOTE — CONSULT LETTER
[Dear  ___] : Dear  [unfilled], [Courtesy Letter:] : I had the pleasure of seeing your patient, [unfilled], in my office today. [Please see my note below.] : Please see my note below. [Consult Closing:] : Thank you very much for allowing me to participate in the care of this patient.  If you have any questions, please do not hesitate to contact me. [Sincerely,] : Sincerely, [FreeTextEntry2] : Dr. Jean Elizabeth (PULM/ref)\par Dr. Jordin Lebron MD (ONC)\par Dr. Tawny Cedillo (PCP) \par \par  [FreeTextEntry3] : Vernon Wilkins MD, FACS \par , Division of Thoracic Surgery \par [facility] \par Chief, Thoracic Surgery \par [facility] \par Department of Cardiovascular & Thoracic Surgery \par  \par [facility] [employer] [facility]\par

## 2022-06-15 NOTE — PHYSICAL EXAM
[Sclera] : the sclera and conjunctiva were normal [Neck Appearance] : the appearance of the neck was normal [Respiration, Rhythm And Depth] : normal respiratory rhythm and effort [Auscultation Breath Sounds / Voice Sounds] : lungs were clear to auscultation bilaterally [Heart Rate And Rhythm] : heart rate was normal and rhythm regular [Heart Sounds] : normal S1 and S2 [Examination Of The Chest] : the chest was normal in appearance [Abdomen Soft] : soft [Abdomen Tenderness] : non-tender [FreeTextEntry1] : deferred [Cervical Lymph Nodes Enlarged Posterior Bilaterally] : posterior cervical [Cervical Lymph Nodes Enlarged Anterior Bilaterally] : anterior cervical [Supraclavicular Lymph Nodes Enlarged Bilaterally] : supraclavicular [No CVA Tenderness] : no ~M costovertebral angle tenderness [Abnormal Walk] : normal gait [Skin Color & Pigmentation] : normal skin color and pigmentation [No Focal Deficits] : no focal deficits [Oriented To Time, Place, And Person] : oriented to person, place, and time [Impaired Insight] : insight and judgment were intact [Affect] : the affect was normal

## 2022-06-19 ENCOUNTER — OUTPATIENT (OUTPATIENT)
Dept: OUTPATIENT SERVICES | Facility: HOSPITAL | Age: 77
LOS: 1 days | End: 2022-06-19
Payer: MEDICARE

## 2022-06-19 ENCOUNTER — APPOINTMENT (OUTPATIENT)
Dept: MRI IMAGING | Facility: IMAGING CENTER | Age: 77
End: 2022-06-19
Payer: MEDICARE

## 2022-06-19 DIAGNOSIS — Z98.890 OTHER SPECIFIED POSTPROCEDURAL STATES: Chronic | ICD-10-CM

## 2022-06-19 DIAGNOSIS — Z00.8 ENCOUNTER FOR OTHER GENERAL EXAMINATION: ICD-10-CM

## 2022-06-19 DIAGNOSIS — Z98.89 OTHER SPECIFIED POSTPROCEDURAL STATES: Chronic | ICD-10-CM

## 2022-06-19 DIAGNOSIS — Z90.710 ACQUIRED ABSENCE OF BOTH CERVIX AND UTERUS: Chronic | ICD-10-CM

## 2022-06-19 DIAGNOSIS — Z90.49 ACQUIRED ABSENCE OF OTHER SPECIFIED PARTS OF DIGESTIVE TRACT: Chronic | ICD-10-CM

## 2022-06-19 PROCEDURE — 72146 MRI CHEST SPINE W/O DYE: CPT | Mod: MH

## 2022-06-19 PROCEDURE — 72146 MRI CHEST SPINE W/O DYE: CPT | Mod: 26,MH

## 2022-06-20 ENCOUNTER — NON-APPOINTMENT (OUTPATIENT)
Age: 77
End: 2022-06-20

## 2022-07-13 ENCOUNTER — APPOINTMENT (OUTPATIENT)
Dept: INTERVENTIONAL RADIOLOGY/VASCULAR | Facility: CLINIC | Age: 77
End: 2022-07-13

## 2022-07-29 ENCOUNTER — NON-APPOINTMENT (OUTPATIENT)
Age: 77
End: 2022-07-29

## 2022-08-02 ENCOUNTER — APPOINTMENT (OUTPATIENT)
Dept: OBGYN | Facility: CLINIC | Age: 77
End: 2022-08-02

## 2022-08-02 VITALS
WEIGHT: 119 LBS | HEIGHT: 61 IN | DIASTOLIC BLOOD PRESSURE: 82 MMHG | SYSTOLIC BLOOD PRESSURE: 136 MMHG | BODY MASS INDEX: 22.47 KG/M2

## 2022-08-02 DIAGNOSIS — R16.0 HEPATOMEGALY, NOT ELSEWHERE CLASSIFIED: ICD-10-CM

## 2022-08-02 DIAGNOSIS — N36.2 URETHRAL CARUNCLE: ICD-10-CM

## 2022-08-02 PROCEDURE — G0328 FECAL BLOOD SCRN IMMUNOASSAY: CPT | Mod: GA,QW

## 2022-08-02 PROCEDURE — G0101: CPT | Mod: GA

## 2022-08-03 ENCOUNTER — APPOINTMENT (OUTPATIENT)
Dept: ORTHOPEDIC SURGERY | Facility: CLINIC | Age: 77
End: 2022-08-03

## 2022-08-03 VITALS
OXYGEN SATURATION: 97 % | DIASTOLIC BLOOD PRESSURE: 78 MMHG | WEIGHT: 119 LBS | SYSTOLIC BLOOD PRESSURE: 133 MMHG | BODY MASS INDEX: 22.47 KG/M2 | HEART RATE: 83 BPM | HEIGHT: 61 IN

## 2022-08-03 PROCEDURE — 99214 OFFICE O/P EST MOD 30 MIN: CPT

## 2022-08-03 NOTE — PHYSICAL EXAM
[de-identified] : Lumbar Physical Exam\par \par The patient's gait has dramatically improved as compared to her last visit, she is walking with a walker\par \par Reflexes\par Patellar - normal\par Gastroc - normal\par Clonus - No\par \par Hip Exam - Normal\par \par Straight leg raise - none\par \par Pulses - 2+ dp/pt\par \par Range of motion - normal\par \par Sensation \par Sensation intact to light touch in L1, L2, L3, L4, L5 and S1 dermatomes bilaterally\par \par Motor\par 	IP	Quad	HS	TA	Gastroc	EHL\par Right	5/5	5/5	5/5	5/5	5/5	5/5\par Left	5/5	5/5	5/5	5/5	5/5	5/5 [de-identified] : Lumbar MRI reviewed\par L1 compression fracture\par Less than 50% vertebral body height loss\par No areas of critical central stenosis\par Orders of critical foraminal stenosis

## 2022-08-03 NOTE — HISTORY OF PRESENT ILLNESS
[de-identified] : Today the patient states that overall her back pain is improved.  She denies any severe radicular type pain.  She denies any saddle anesthesia.  She denies any bowel bladder issues.  She is able to do more more of her activities of daily living.  She did recently get a liver procedure for her cancer diagnosis.  She recovered well from this.\par \par 05/09/22\par Today the patient states that she is still dealing with some back pain.  She denies any severe radicular type pain.  She denies any bowel bladder issues.  She denies any saddle anesthesia.  Overall she is pleased with her improvement in symptoms.  She is however still dealing with some residual discomfort.  She is wearing her LSO brace for comfort.\par \par 04/20/22\par This is a 77-year-old female that is here today for evaluation of his low back pain.  On April 8 the patient had a fall.  Since that time she has been dealing with back pain.  She had 2 trips to the emergency room.  Eventually she had an MRI which did show an L1 compression fracture.  She denies any radiating type pain down her legs.  She does state that her pain has improved as compared to her first day of injury.  She denies any bowel bladder issues.  She denies any saddle anesthesia.  Of note the patient does have a complex history in relation to cancer as dictated in other portions of her medical chart.

## 2022-08-09 ENCOUNTER — NON-APPOINTMENT (OUTPATIENT)
Age: 77
End: 2022-08-09

## 2022-08-25 ENCOUNTER — APPOINTMENT (OUTPATIENT)
Dept: OTOLARYNGOLOGY | Facility: CLINIC | Age: 77
End: 2022-08-25

## 2022-08-25 VITALS
HEIGHT: 61 IN | BODY MASS INDEX: 22.47 KG/M2 | DIASTOLIC BLOOD PRESSURE: 72 MMHG | SYSTOLIC BLOOD PRESSURE: 126 MMHG | WEIGHT: 119 LBS | HEART RATE: 90 BPM | TEMPERATURE: 98.6 F

## 2022-08-25 PROCEDURE — 31231 NASAL ENDOSCOPY DX: CPT

## 2022-08-25 PROCEDURE — 99213 OFFICE O/P EST LOW 20 MIN: CPT | Mod: 25

## 2022-08-25 NOTE — ASSESSMENT
[FreeTextEntry1] : Patient with hx of vertigo present for routine visit. Uses Flonase and allegra for PND and nasal congestion. Was recently diagnosed with liver cancer. \par \par DNS and Nasal congestion:\par -continue nasal saline \par -Steam humidification advised\par \par f/u 6 months or prn

## 2022-08-25 NOTE — PROCEDURE
[FreeTextEntry3] : \par Anterior Rhinoscopy insufficient to account for symptoms.\par \par After informed verbal consent is obtained, the fiberoptic nasal endoscope #3 is passed via the right nasal cavity.\par The following anatomic sites were directly examined in a sequential fashion:\par The scope was introduced in both  nasal passages between the middle and inferior turbinates to exam the inferior portion of the middle meatus and the fontanelle, as well as the maxillary ostia.  Next, the scope was passed medially and posteriorly to the middle turbinates to examine the sphenoethmoid recess and the superior turbinate region.\par   There is [ 0 ]% obstruction of the nasopharynx with adenoid tissue.\par \par A deviated nasal septum was noted causing obstruction.\par The turbinates were congested-bilateral.\par \par Right Side:\par ·	Mucosa: wnl\par ·	Mucous: none\par ·	Polyp: none\par ·	Inferior Turbinate: sl congested\par ·	Middle Turbinate:sl congested\par ·	Superior Turbinate: wnl\par ·	Inferior Meatus:clear\par ·	Middle Meatus: clear\par ·	Super Meatus: clear\par ·	Sphenoethmoidal Recess: wnl\par \par \par \par Left Side:\par ·	Mucosa: wnl\par ·	Mucous:none\par ·	Polyp: none\par ·	Inferior Turbinate: sl congested\par ·	Middle Turbinate: sl congested\par ·	Superior Turbinate:wnl\par ·	Inferior Meatus: clear\par ·	Middle Meatus: clear\par ·	Super Meatus:clear\par ·	Sphenoethmoidal Recess: wnl\par \par \par

## 2022-08-25 NOTE — PHYSICAL EXAM
[de-identified] : B/L tenderness  [Nasal Endoscopy Performed] : nasal endoscopy was performed, see procedure section for findings [] : septum deviated to the left [Midline] : trachea located in midline position [Normal] : no rashes

## 2022-08-25 NOTE — HISTORY OF PRESENT ILLNESS
[No] : patient does not have a  history of radiation therapy [de-identified] : 78 yo female with hx of vertigo presents for follow up. She was diagnosed with liver cancer. Dizziness is controlled. SHe takes  nasal saline  for her allergies and nasal congestion which helps. Patient presents for follow up. States she continues to have nasal congestion, uses saline prn with relief. She complains she gets tingling sensation in her tongue when she is with nasal congestion.   Pt has no ear pain, ear drainage, hearing loss, tinnitus, nasal discharge, epistaxis, sinus infections, facial pain, facial pressure, throat pain, dysphagia or fevers\par \par \par \par \par  [Vertigo] : vertigo [Anxiety] : no anxiety [Dizziness] : dizziness [Headache] : no headache [Hearing Loss] : no hearing loss [Recurrent Otitis Media] : no recurrent otitis media [Otitis Media with Effusion] : no otitis media with effusion [Presbycusis] : no presbycusis [Congenital Ear Malformation] : no congenital ear malformation [Meniere Disease] : no Meniere disease [Otosclerosis] : no otosclerosis [Perilymphatic Fistula] : no perilymphatic fistula [Hypertension] : no hypertension [Loud Noise Exposure] : no history of loud noise exposure [Smoking] : no smoking [Early Onset Hearing Loss] : no early onset hearing loss [Stroke] : no stroke [Facial Pain] : no facial pain [Facial Pressure] : no facial pressure [Nasal Congestion] : nasal congestion [Diplopia] : no diplopia [Ear Fullness] : no ear fullness [Allergic Rhinitis] : no allergic rhinitis [Maxillary Tooth Infection] : no maxillary tooth infection [Septal Deviation] : no septal deviation [Environmental Allergies] : no environmental allergies [Seasonal Allergies] : no seasonal allergies [Environmental Allergens] : no environmental allergens [Adenoidectomy] : no adenoidectomy [Nasal FB Removal] : no nasal foreign body removal [Allergies] : no allergies [Asthma] : no asthma [Neck Mass] : no neck mass [Neck Pain] : no neck pain [Chills] : no chills [Cold Intolerance] : no cold intolerance [Cough] : no cough [Fatigue] : no fatigue [Heat Intolerance] : no heat intolerance [Hyperthyroidism] : no hyperthyroidism [Sialadenitis] : no sialadenitis [Hodgkin Disease] : no hodgkin disease [Non-Hodgkin Lymphoma] : no non-hodgkin lymphoma [Tobacco Use] : no tobacco use [Alcohol Use] : no alcohol use [Graves Disease] : no graves disease [Thyroid Cancer] : no thyroid cancer

## 2022-10-13 PROBLEM — M99.00 CRANIAL SOMATIC DYSFUNCTION: Status: ACTIVE | Noted: 2021-08-11

## 2022-10-24 ENCOUNTER — APPOINTMENT (OUTPATIENT)
Dept: SURGICAL ONCOLOGY | Facility: CLINIC | Age: 77
End: 2022-10-24

## 2022-10-24 VITALS
BODY MASS INDEX: 23.03 KG/M2 | RESPIRATION RATE: 16 BRPM | HEIGHT: 61 IN | OXYGEN SATURATION: 95 % | HEART RATE: 82 BPM | DIASTOLIC BLOOD PRESSURE: 77 MMHG | WEIGHT: 122 LBS | SYSTOLIC BLOOD PRESSURE: 146 MMHG

## 2022-10-24 PROCEDURE — 99214 OFFICE O/P EST MOD 30 MIN: CPT

## 2022-10-24 NOTE — CONSULT LETTER
[Dear  ___] : Dear  [unfilled], [Courtesy Letter:] : I had the pleasure of seeing your patient, [unfilled], in my office today. [Please see my note below.] : Please see my note below. [Consult Closing:] : Thank you very much for allowing me to participate in the care of this patient.  If you have any questions, please do not hesitate to contact me. [FreeTextEntry1] : I will keep you informed of my follow-up. [Sincerely,] : Sincerely, [FreeTextEntry3] : Jose Sandhu MD FACS\par Chief of Surgical Oncology\par \par  [DrMurtaza  ___] : Dr. CARPENTER

## 2022-10-24 NOTE — PHYSICAL EXAM
[Normal] : supple, no neck mass and thyroid not enlarged [Normal Supraclavicular Lymph Nodes] : normal supraclavicular lymph nodes [Normal Axillary Lymph Nodes] : normal axillary lymph nodes [Normal] : oriented to person, place and time, with appropriate affect [de-identified] : healed right breast incision; no masses or nipple discharge \par  [FreeTextEntry1] : deferred

## 2022-10-24 NOTE — HISTORY OF PRESENT ILLNESS
[de-identified] : Tonya Siddiqui is a 77 year old female who presents to the office for a follow up visit. \par \par Past Hx: \par s/p RIGHT Breast Lumpectomy on 10/8/2003 for moderately differentiated infiltrating ductal carcinoma, followed by Radiation Therapy.  Completed course of Tamoxifen and Arimidex.\par BRCA NEGATIVE\par  \par Blood work done by Dr. Benavides (Med-Onc).  CA27.29 done in 11/2020 (13.8 U/mL)\par Colonoscopy 8/2019- Diverticulosis of the descending colon.  Internal hemorrhoids. \par \par MRI Liver 4/2021- Left hepatic 13 mm mildly T2 hyperintense lesion may represent a hemangioma but is indeterminate. Malignant lesion is not excluded. Continued f/u is recommended. \par \par Pt. is s/p right lobectomy under the care of Dr. Wilkins ([facility]) in 6/2021.  Final pathology reveled non small cell carcinoma, favor solid predominant adenocarcinoma. Negative nodes. Pt. did not need adjuvant chemo or RT. \par \par MRI Liver 10/6/2021- The indeterminate lesion seen within the left hepatic lobe on prior studies has slightly enlarged and must be reviewed with suspicion for neoplasia, particularly metastatic disease. \par \par PET CT 10/11/2021- IMP: 1) Since FDG PET 4/2021, interval right middle lobectomy, no new FDG avid suspicious lung nodule.   2) Stable background FDG uptake within the enlarging indeterminant segment 4a liver lesion, better characterized on MRI dated 10/6/2021, differential diagnosis includes low grade neoplastic versus early metastatic disease, less likely aggressive malignancy;  3) No evidence of FDG avid yessenia or distant metastases.  \par \par She is s/p ultrasound guided liver biopsy of segment 4A on 10/26/21. Final Path: Neuroendocrine tumor  \par \par mammo/sonogram done on 10/14/2021. BIRADS 1\par Breast MRI 4/18/22- No evidence of malignancy.  BIRADS 1 \par \par MR liver 6/2022- 2 hepatic metastases, a 2.0 cm mass in segment 4, increased from 1.7 cm, and a 2.0 cm mass in segment 2/3- only faintly evident on prior study, previously 0.8 cm \par \par MR liver 8/2022- interval decrease of 1.7 cm mass in segment 8 w/ persistent central enhancement c/w viable tumor, slight decrease in size of a 0.6 cm lesion in segment 3. New 0.5 cm lesion in segment 7 - stable since 6/2022 but new from 10/2021\par \par Pt. is on Lanreotide under the care of Dr. Benavides. \par Pt. continues to experience right breast pain. \par B/L mammo/sono 10/2022- BIRADS 1\par Denies palpable breast masses, nipple discharge, skin dimpling or inversion. \par \par Internist: Dr. Gonzalez

## 2022-10-24 NOTE — ASSESSMENT
[FreeTextEntry1] : IMP: \par JEANETTE - hx of infiltrating ductal carcinoma 2003 s/p right lumpectomy \par On no further adjuvant\par \par Pt. is s/p right lobectomy under the care of Dr. Wilkins ([facility]) in 6/2021.  Final pathology reveled non small cell carcinoma, favor solid predominant adenocarcinoma. Negative nodes.  Pt. did not need adjuvant chemo or RT. \par \par - s/p ultrasound guided liver biopsy 10/26/21. Final Path: Neuroendocrine tumor - on Lanreotide (metastatic from lung) on Lanreotide \par \par B/L mammo/sono 10/2021- BIRADS 1 \par Breast MRI 4/18/22- No evidence of malignancy.  BIRADS 1 \par  \par MR liver 6/2022- 2 hepatic metastases, a 2.0 cm mass in segment 4, increased from 1.7 cm, and a 2.0 cm mass in segment 2/3- only faintly evident on prior study, previously 0.8 cm \par \par MR liver 8/2022- interval decrease of 1.7 cm mass in segment 8 w/ persistent central enhancement c/w viable tumor, slight decrease in size of a 0.6 cm lesion in segment 3. New 0.5 cm lesion in segment 7 - stable since 6/2022 but new from 10/2021\par \par B/L mammo/sono 10/2022- BIRADS 1 \par \par Plan:\par RTO Q6 months \par Mammogram/ sonogram October 2023\par MRI of breast 4/2023 (ordered)\par Neuroendocrine tumor management by Dr. Benavides\par \par

## 2022-10-24 NOTE — REVIEW OF SYSTEMS
[Negative] : Heme/Lymph [FreeTextEntry6] : see HPI [de-identified] : see HPI [de-identified] : see HPI

## 2022-11-21 ENCOUNTER — NON-APPOINTMENT (OUTPATIENT)
Age: 77
End: 2022-11-21

## 2022-11-27 ENCOUNTER — OUTPATIENT (OUTPATIENT)
Dept: OUTPATIENT SERVICES | Facility: HOSPITAL | Age: 77
LOS: 1 days | End: 2022-11-27
Payer: MEDICARE

## 2022-11-27 ENCOUNTER — APPOINTMENT (OUTPATIENT)
Dept: MRI IMAGING | Facility: IMAGING CENTER | Age: 77
End: 2022-11-27

## 2022-11-27 DIAGNOSIS — C7A.8 OTHER MALIGNANT NEUROENDOCRINE TUMORS: ICD-10-CM

## 2022-11-27 DIAGNOSIS — Z98.89 OTHER SPECIFIED POSTPROCEDURAL STATES: Chronic | ICD-10-CM

## 2022-11-27 DIAGNOSIS — Z90.710 ACQUIRED ABSENCE OF BOTH CERVIX AND UTERUS: Chronic | ICD-10-CM

## 2022-11-27 DIAGNOSIS — Z98.890 OTHER SPECIFIED POSTPROCEDURAL STATES: Chronic | ICD-10-CM

## 2022-11-27 DIAGNOSIS — Z90.49 ACQUIRED ABSENCE OF OTHER SPECIFIED PARTS OF DIGESTIVE TRACT: Chronic | ICD-10-CM

## 2022-11-27 PROCEDURE — 72148 MRI LUMBAR SPINE W/O DYE: CPT | Mod: 26,MH

## 2022-11-27 PROCEDURE — 72146 MRI CHEST SPINE W/O DYE: CPT | Mod: 26,MH

## 2022-11-27 PROCEDURE — 72146 MRI CHEST SPINE W/O DYE: CPT | Mod: MH

## 2022-11-27 PROCEDURE — 72148 MRI LUMBAR SPINE W/O DYE: CPT | Mod: MH

## 2022-12-06 ENCOUNTER — APPOINTMENT (OUTPATIENT)
Dept: CT IMAGING | Facility: IMAGING CENTER | Age: 77
End: 2022-12-06

## 2022-12-06 ENCOUNTER — OUTPATIENT (OUTPATIENT)
Dept: OUTPATIENT SERVICES | Facility: HOSPITAL | Age: 77
LOS: 1 days | End: 2022-12-06
Payer: MEDICARE

## 2022-12-06 DIAGNOSIS — Z98.890 OTHER SPECIFIED POSTPROCEDURAL STATES: Chronic | ICD-10-CM

## 2022-12-06 DIAGNOSIS — Z98.89 OTHER SPECIFIED POSTPROCEDURAL STATES: Chronic | ICD-10-CM

## 2022-12-06 DIAGNOSIS — Z90.49 ACQUIRED ABSENCE OF OTHER SPECIFIED PARTS OF DIGESTIVE TRACT: Chronic | ICD-10-CM

## 2022-12-06 DIAGNOSIS — Z90.710 ACQUIRED ABSENCE OF BOTH CERVIX AND UTERUS: Chronic | ICD-10-CM

## 2022-12-06 DIAGNOSIS — R91.1 SOLITARY PULMONARY NODULE: ICD-10-CM

## 2022-12-06 DIAGNOSIS — C34.90 MALIGNANT NEOPLASM OF UNSPECIFIED PART OF UNSPECIFIED BRONCHUS OR LUNG: ICD-10-CM

## 2022-12-06 PROCEDURE — 71250 CT THORAX DX C-: CPT

## 2022-12-06 PROCEDURE — 71250 CT THORAX DX C-: CPT | Mod: 26,MH

## 2022-12-08 NOTE — ASSESSMENT
[FreeTextEntry1] : Patient with hx of vertigo present for routine visit. Uses Flonase and allegra for PND and nasal congestion. Was recently diagnosed with liver cancer. Complains she had a scratch in her throat while she was being put under anaesthesia. No examination no scratches were noted. \par \par Vertigo\par -now resolved \par \par DNS and Nasal congestion:\par -continue with Flonase, nasal saline and Allegra \par -Steam humidification advised\par \par No evidence of laryngopharyngeal-irritation  \par \par \par \par f/u 6 months or prn  Banner Transposition Flap Text: The defect edges were debeveled with a #15 scalpel blade.  Given the location of the defect and the proximity to free margins a Banner transposition flap was deemed most appropriate.  Using a sterile surgical marker, an appropriate flap drawn around the defect. The area thus outlined was incised deep to adipose tissue with a #15 scalpel blade.  The skin margins were undermined to an appropriate distance in all directions utilizing iris scissors.

## 2022-12-09 ENCOUNTER — APPOINTMENT (OUTPATIENT)
Dept: ORTHOPEDIC SURGERY | Facility: CLINIC | Age: 77
End: 2022-12-09

## 2022-12-09 PROCEDURE — 99214 OFFICE O/P EST MOD 30 MIN: CPT

## 2022-12-09 NOTE — PHYSICAL EXAM
[de-identified] : Lumbar Physical Exam\par \par Gait - Normal\par \par Station - Normal\par \par Sagittal balance - Normal\par \par Compensatory mechanism? - None\par \par Heel Walk - Normal \par \par Toe Walk - Normal\par \par Reflexes\par Patellar- Normal\par Gastroc - Normal\par Clonus - No\par \par Hip Exam - Normal\par \par Straight leg raise - none\par \par Pulses - 2+ dp/pt\par \par Range of motion - normal\par \par Sensation\par Sensation intact to light touch in L1, L2, L3, L4, L5 and S1 dermatomes bilaterally\par \par \par Motor\par \par                IP     Quad     HS       TA      Gastroc     EHL\par Right       5/5 5/5 5/5 5/5 5/5 5/5\par Left         5/5 5/5 5/5 5/5 5/5 5/5\par \par  [de-identified] : Lumbar MRI reviewed\par L1 compression fracture\par Less than 50% vertebral body height loss\par No areas of critical central stenosis\par Orders of critical foraminal stenosis

## 2022-12-09 NOTE — HISTORY OF PRESENT ILLNESS
[de-identified] : 12/9/22\par This is a 77 year old female who is following up on her back pain. Patient is feeling better.She states the pain radiates to the middle of her back when she's standing for long periods of time but when she sits down it goes away .Patient did do physical therapy but states it makes her symptoms worse. We reviewed her MRI results. She denies any issues with bowel or bladder dysfunction or saddle anesthesia.\par \par 8/3/22\par Today the patient states that overall her back pain is improved.  She denies any severe radicular type pain.  She denies any saddle anesthesia.  She denies any bowel bladder issues.  She is able to do more more of her activities of daily living.  She did recently get a liver procedure for her cancer diagnosis.  She recovered well from this.\par \par 05/09/22\par Today the patient states that she is still dealing with some back pain.  She denies any severe radicular type pain.  She denies any bowel bladder issues.  She denies any saddle anesthesia.  Overall she is pleased with her improvement in symptoms.  She is however still dealing with some residual discomfort.  She is wearing her LSO brace for comfort.\par \par 04/20/22\par This is a 77-year-old female that is here today for evaluation of his low back pain.  On April 8 the patient had a fall.  Since that time she has been dealing with back pain.  She had 2 trips to the emergency room.  Eventually she had an MRI which did show an L1 compression fracture.  She denies any radiating type pain down her legs.  She does state that her pain has improved as compared to her first day of injury.  She denies any bowel bladder issues.  She denies any saddle anesthesia.  Of note the patient does have a complex history in relation to cancer as dictated in other portions of her medical chart.

## 2022-12-14 ENCOUNTER — APPOINTMENT (OUTPATIENT)
Dept: THORACIC SURGERY | Facility: CLINIC | Age: 77
End: 2022-12-14

## 2022-12-14 VITALS
OXYGEN SATURATION: 97 % | BODY MASS INDEX: 23.22 KG/M2 | RESPIRATION RATE: 16 BRPM | SYSTOLIC BLOOD PRESSURE: 132 MMHG | DIASTOLIC BLOOD PRESSURE: 75 MMHG | WEIGHT: 123 LBS | HEART RATE: 79 BPM | HEIGHT: 61 IN

## 2022-12-14 PROCEDURE — 99214 OFFICE O/P EST MOD 30 MIN: CPT

## 2022-12-15 ENCOUNTER — NON-APPOINTMENT (OUTPATIENT)
Age: 77
End: 2022-12-15

## 2022-12-15 NOTE — ASSESSMENT
[FreeTextEntry1] : Ms. MARIFER BENTLEY, 77 year old female, former smoker (2 PPD x 3 years; Quit 1970), w/ hx of breast cancer s/p lumpectomy and radiation 2003, who was undergoing routine evaluation of her breast that revealed 0.9 cm RML LUNG LESION on MRI Breast 4/13/2021, with mildly avidity confirmed on PET/CT 4/16/21. The patient is otherwise in good health. In 2020, she was hospitalized at [facility] for chest pain and underwent a negative coronary angiogram. \par \par 6/3/2021-Now, s/p Bronchoscopy, right video thoracoscopy, right middle lobectomy, and mediastinal lymph node sampling on 6/3/21; path of RML revealed poorly differentiated non small cell carcinoma, favor solid predominant adenocarcinoma, 0.9 cm, 0/12 LN negative, pT1aN0.  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   \par Latest scans JEANETTE in chest stable liver exam. Feels well except minor right anterior chest discomfort . \par \par \par I have independently reviewed the medical records and imaging at the time of this office consultation.\par \par Recommendations reviewed with patient during this office visit, and all questions answered; Patient instructed on the importance of follow up and verbalizes understanding. \par I have reviewed the patient's medical records and diagnostic images during the time of this office visit, and I have made the following recommendation: Follow up in 6 months with noncontrast Chest CT.  She should continue regular follow up with her oncologist (Dr. Benavides).  For the mild  neuropathic right breast pain, I have recommended that she use Lidocaine pain patches to affected area.  \par \par I personally performed the services described in the documentation, reviewed the documentation recorded by the scribe in my presence and it accurately and completely records my words and actions.\par \par I, Malissa Leyva NP, am scribing for and the presence of Dr. Wilkins, the following sections HISTORY OF PRESENT ILLNESS, PAST MEDICAL/FAMILY/SOCIAL HISTORY; REVIEW OF SYSTEMS; VITAL SIGNS; PHYSICAL EXAM; DISPOSITION.\par  \par \par \par

## 2022-12-15 NOTE — CONSULT LETTER
[Dear  ___] : Dear  [unfilled], [Courtesy Letter:] : I had the pleasure of seeing your patient, [unfilled], in my office today. [Please see my note below.] : Please see my note below. [Consult Closing:] : Thank you very much for allowing me to participate in the care of this patient.  If you have any questions, please do not hesitate to contact me. [Sincerely,] : Sincerely, [FreeTextEntry2] : Dr. Jean Elizabeth (PULM/ref)\par Dr. Jordin Lebron MD (ONC)\par Dr. Tawny Cedillo (PCP) \par \par  [FreeTextEntry3] : Vernon Wilkins MD, FACS \par , Division of Thoracic Surgery \par [facility] \par Chief, Thoracic Surgery \par [facility] \par Department of Cardiovascular & Thoracic Surgery \par  \par [facility] [employer] [facility]\par

## 2022-12-15 NOTE — HISTORY OF PRESENT ILLNESS
[FreeTextEntry1] : Ms. MARIFER BENTLEY, 77 year old female, former smoker (2 PPD x 3 years; Quit 1970), w/ hx of breast cancer s/p lumpectomy and radiation 2003, who was undergoing routine evaluation of her breast that revealed 0.9 cm RML LUNG LESION on MRI Breast 4/13/2021, with mildly avidity confirmed on PET/CT 4/16/21. The patient is otherwise in good health. In 2020, she was hospitalized at [facility] for chest pain and underwent a negative coronary angiogram. \par \par 6/3/2021-Now, s/p Bronchoscopy, right video thoracoscopy, right middle lobectomy, and mediastinal lymph node sampling on 6/3/21; path of RML revealed poorly differentiated non small cell carcinoma, favor solid predominant adenocarcinoma, 0.9 cm, 0/12 LN negative, pT1aN0.  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   \par \par CT Chest on 7/29/21: \par - s/p RMLobectomy \par - Mild nodular opacity along the chain sutures; Cluster of nodular opacities within the anterobasilar RLL. (series 2, image 69).\par -  Slight increase in subpleural opacity within the superior segment of the RLL (series 2, image 24-29)\par - Unchanged 5 mm basilar RLL nodule (series 2, image 82)\par - Unchanged 6 mm ROBERT nodule along the fissure (series 2, image 18)\par \par Of note, she has several cysts of her liver which have been known for a number of years. October, 2021, follow up MRI Liver demonstrating indeterminate lesion seen within the left hepatic lobe on prior studies has slightly enlarged and must be reviewed with suspicion for neoplasia, particularly metastatic disease. \par \par s/p Ultrasound guided liver biopsy of segment 4A on 10/26/21. Final Path: Neuroendocrine tumor \par \par PET CT 10/11/2021:\par - No new FDG avid suspicious lung nodule \par - Stable background FDG uptake within the enlarging indeterminate segment 4a liver lesion. \par - No evidence of FDG avid yessenia or distant metastases\par \par DOTATATE Scan on 12/2/2021: \par - Markedly increased DOTATATE uptake at the gastric fundus, SUV 12, involving 3 cm segment.\par - Heterogeneous distribution of the DOTATATE uptake in the liver with multiple punctate foci, SUV ranges between 7-8. The largest hypdense lesion measuring 2 x 1.8 cm is located at segment 8 (Image 151)\par - Focally increased DOTATATE uptake at the T1 vertebral body without correlating lytic or sclerotic lesion. 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  Case reviewed with patient. 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 Plan for IV Somatostatin Analogue. Further liver and spine workup to be conducted by Dr. Benavides\par \par CT on 6/6/22: \par - Post op changes\par - Small diffusely calcified right lower lobe nodule is unchanged\par - Stable, noncalcified sub-5 mm RLL nodule image (83 series 2) and fissural nodule  (image 14)\par - Small hiatal hernia\par - 1.7 cm hypodense right hepatic lobe lesion stable since 7/29/2021. Subcentimeter hypodensities in the right and left hepatic lobes are stable in size.\par - Diffuse qualitative osteopenia. \par - New, mild L1 vertebral body compression fracture with mild retropulsion of fracture fragment posteriorly into the spinal canal. \par - Stable wedging deformity of the T5 vertebral body. There are surgical clips in the right axilla\par  \par MRI Liver w/wo contrast done on 6/7/22 (ordered by Dr. Benavides).  The report notes 2 hepatic metastases, a 2.0 cm mass hepatic segment 4 increased from 1.7 cm. and a 2.0 cm mass in hepatis segment 2/3 which is only faintly evident on prior study, increased from approximately 0.8 cm on the prior study.  \par \par She was treated with liver embolization in July 2022.  \par MRI Abdomen w/w/o contrast on 11/28/22 revealed:\par - stable 2 cm viable metastasis in segment 4A/8.\par - stable 0.5 cm metastasis in segment 3 which demonstrates no central enhancement\par - stable 0.5 indeterminate lesion in segment 7\par \par She is scheduled to follow with Dr. Benavides in 3 months.  \par \par Last follow up with Dr. Sandhu (shonda/onc) on 10/24/22 with plan to follow up in 6 months, not currently undergoing any treatment. \par \par CT Chest on 12/06/2022:\par - Status post right middle lobectomy as well as wedge resection in the right upper lobe.\par - Mucous/secretions are noted within few of the bronchi in the right lower lobe. \par - Stable 0.5 cm solid nodule in the right lower lobe.\par - Calcified nodule is noted in the right lower lobe. \par \par \par Patient presents today for 6 month follow up.  She complains of occasional cough, and neuropathic pain that radiates to her right breast.  She was previously seen by pain management and was taking Gabapentin, but she reports that the pain had improved and she was weaned off gabapentin.  She continues to complain of discomfort on a daily basis, but is not taking any pain medication for it.  She denies any fever, chills, shortness of breath, chest pain, hemoptysis, or recent illness. 
DISPLAY PLAN FREE TEXT

## 2022-12-27 ENCOUNTER — APPOINTMENT (OUTPATIENT)
Dept: OTOLARYNGOLOGY | Facility: CLINIC | Age: 77
End: 2022-12-27

## 2022-12-27 VITALS
WEIGHT: 124 LBS | BODY MASS INDEX: 23.41 KG/M2 | HEART RATE: 85 BPM | DIASTOLIC BLOOD PRESSURE: 79 MMHG | HEIGHT: 61 IN | SYSTOLIC BLOOD PRESSURE: 148 MMHG

## 2022-12-27 DIAGNOSIS — J34.2 DEVIATED NASAL SEPTUM: ICD-10-CM

## 2022-12-27 DIAGNOSIS — K21.9 GASTRO-ESOPHAGEAL REFLUX DISEASE W/OUT ESOPHAGITIS: ICD-10-CM

## 2022-12-27 DIAGNOSIS — J31.0 CHRONIC RHINITIS: ICD-10-CM

## 2022-12-27 DIAGNOSIS — H90.3 SENSORINEURAL HEARING LOSS, BILATERAL: ICD-10-CM

## 2022-12-27 DIAGNOSIS — R05.9 COUGH, UNSPECIFIED: ICD-10-CM

## 2022-12-27 PROCEDURE — 31231 NASAL ENDOSCOPY DX: CPT

## 2022-12-27 PROCEDURE — 99214 OFFICE O/P EST MOD 30 MIN: CPT | Mod: 25

## 2022-12-27 RX ORDER — METFORMIN ER 500 MG 500 MG/1
500 TABLET ORAL
Qty: 90 | Refills: 0 | Status: ACTIVE | COMMUNITY
Start: 2022-12-20

## 2022-12-27 RX ORDER — METFORMIN HYDROCHLORIDE 500 MG/1
500 TABLET, COATED ORAL
Qty: 60 | Refills: 0 | Status: ACTIVE | COMMUNITY
Start: 2022-10-19

## 2022-12-27 NOTE — ASSESSMENT
[FreeTextEntry1] : Ms. BENTLEY 77 year F complains she continues to have nasal congestion despite using Hypertonic saline. Currently undergoing treatment for Liver Cancer  \par \par DNS and Rhinitis\par -Hypertonic saline nasal irrigations \par -Steam Humidification advised\par -Advised for CT sinus to further evaluate possible need for balloon sinuplasty \par \par f/u prn

## 2022-12-27 NOTE — HISTORY OF PRESENT ILLNESS
[Yes] : patient has a history radiation therapy [Nasal Congestion] : nasal congestion [Postnasal Drainage] : postnasal drainage [Cough] : cough [None] : No associated symptoms are reported. [de-identified] : 76 yo female\par PAtient complains of nasal congestion she is using Hypertonic saline daily sees no improvement with it. Some days she feels like it better than others. She used Flonase in the past, stopped using it bec it caused nose bleeds.\par h/o of metastaic breast cancer  [Tinnitus] : no tinnitus [Hearing Loss] : hearing loss [Presbycusis] : presbycusis

## 2023-01-01 NOTE — ASU PATIENT PROFILE, ADULT - NS PRO PASSIVE SMOKE EXP
Asked by Dr. Haney to attend the  delivery of a 40 4/7 week infant for FTP. Meconium stained amniotic fluid was present and infant has had category II FHT. Infant delivered through meconium stained fluid, cried immediately, cord clamping delayed for 30s then infant brought to warmer where copious meconium stained fluids were suctioned from the oropharynx. She responded well to stimulation and continued suctioning. Breath sounds were initially coarse but cleared with continued crying and suctioning. Small amount of nasal flaring and mild tachypnea present, infant otherwise well perfused, vigorous, and appears comfortable, swaddled and brought to parents. Apgars 8/9  
Infant is doing well. No overnight problems. Breast feeding going well. Minimal jaundice. Home today with the mother and follow up in 2 days.  
Infant transferred to room L209 in stable appearing condition with mother. Name bands verified between Gladys RN, and Snow BURCH. Mother confirms having a car seat and crib for baby and follow up pediatrician to be Dr. Jimenes. All questions answered at this time, will continue to monitor.  
Patient transferred to room L209 in stable appearing condition with infant and all belongings. Report received from Mayelin L&D RN and name bands verified between Mayelin RN and Snow BURCH. Oriented to room and discussed plan of care. All questions answered at this time, will continue to monitor.  
Patient transferred via mothers arms in bed in stable condition to L209. Bedside report given to Annika BURCH and security tag and bands verified with Snow BURCH.  
No

## 2023-02-17 ENCOUNTER — APPOINTMENT (OUTPATIENT)
Dept: ORTHOPEDIC SURGERY | Facility: CLINIC | Age: 78
End: 2023-02-17
Payer: MEDICARE

## 2023-02-17 VITALS
HEIGHT: 61 IN | TEMPERATURE: 97.3 F | BODY MASS INDEX: 23.03 KG/M2 | HEART RATE: 77 BPM | OXYGEN SATURATION: 99 % | WEIGHT: 122 LBS | SYSTOLIC BLOOD PRESSURE: 120 MMHG | DIASTOLIC BLOOD PRESSURE: 75 MMHG

## 2023-02-17 PROCEDURE — 99214 OFFICE O/P EST MOD 30 MIN: CPT

## 2023-02-17 NOTE — ADDENDUM
[FreeTextEntry1] : I, Felix Phillips, acted solely as a scribe for Dr. Gavin Gaitan MD on this date 02/17/2023  .\par  \par All medical record entries made by the Scribe were at my, Dr. Gavin Gaitan MD., direction and personally dictated by me on 02/17/2023 . I have reviewed the chart and agree that the record accurately reflects my personal performance of the history, physical exam, assessment and plan. I have also personally directed, reviewed, and agreed with the chart.

## 2023-02-17 NOTE — HISTORY OF PRESENT ILLNESS
[de-identified] : 78 y.o female presents for her f/u appt for her lower back pain. Patient reports her pain has been improving since the last visit but reports that the pain flares up when she is sitting for a long period of time and radiates to the anterior aspect of her hips. Patient reports she has been going to physical therapy which has been helping her sxs.  She denies any issues with bowel or bladder dysfunction or saddle anesthesia.\par \par 12/9/22\par This is a 77 year old female who is following up on her back pain. Patient is feeling better.She states the pain radiates to the middle of her back when she's standing for long periods of time but when she sits down it goes away .Patient did do physical therapy but states it makes her symptoms worse. We reviewed her MRI results. She denies any issues with bowel or bladder dysfunction or saddle anesthesia.\par \par 8/3/22\par Today the patient states that overall her back pain is improved.  She denies any severe radicular type pain.  She denies any saddle anesthesia.  She denies any bowel bladder issues.  She is able to do more more of her activities of daily living.  She did recently get a liver procedure for her cancer diagnosis.  She recovered well from this.\par \par 05/09/22\par Today the patient states that she is still dealing with some back pain.  She denies any severe radicular type pain.  She denies any bowel bladder issues.  She denies any saddle anesthesia.  Overall she is pleased with her improvement in symptoms.  She is however still dealing with some residual discomfort.  She is wearing her LSO brace for comfort.\par \par 04/20/22\par This is a 77-year-old female that is here today for evaluation of his low back pain.  On April 8 the patient had a fall.  Since that time she has been dealing with back pain.  She had 2 trips to the emergency room.  Eventually she had an MRI which did show an L1 compression fracture.  She denies any radiating type pain down her legs.  She does state that her pain has improved as compared to her first day of injury.  She denies any bowel bladder issues.  She denies any saddle anesthesia.  Of note the patient does have a complex history in relation to cancer as dictated in other portions of her medical chart.

## 2023-02-17 NOTE — ASSESSMENT
[FreeTextEntry1] : I had a lengthy discussion with the patient in regards to treatment plan and diagnosis. There are no red flag findings on imaging nor are there any red flag findings on clinical exam. Therefore we will proceed with a course of conservative treatment. This would include physical therapy/ home exercise program, Tylenol, NSAIDS as medically indicated. I did talk with the patient about the possibility to see a pain management specialist to receive an epidural cortisone injection, however the patient doesn't believe her pain is at that level and denied her injections for now. The patient will follow-up with me in approximately 3 months. I encouraged the patient to follow-up sooner if there are any new or worsening symptoms.\par

## 2023-02-17 NOTE — PHYSICAL EXAM
[de-identified] : Lumbar Physical Exam\par \par Gait - Normal\par \par Station - Normal\par \par Sagittal balance - Normal\par \par Compensatory mechanism? - None\par \par Heel Walk - Normal \par \par Toe Walk - Normal\par \par Reflexes\par Patellar- Normal\par Gastroc - Normal\par Clonus - No\par \par Hip Exam - Normal\par \par Straight leg raise - none\par \par Pulses - 2+ dp/pt\par \par Range of motion - normal\par \par Sensation\par Sensation intact to light touch in L1, L2, L3, L4, L5 and S1 dermatomes bilaterally\par \par \par Motor\par \par                IP     Quad     HS       TA      Gastroc     EHL\par Right       5/5 5/5 5/5 5/5 5/5 5/5\par Left         5/5 5/5 5/5 5/5 5/5 5/5\par \par  [de-identified] : Lumbar MRI reviewed\par L1 compression fracture\par Less than 50% vertebral body height loss\par No areas of critical central stenosis\par Orders of critical foraminal stenosis

## 2023-02-21 ENCOUNTER — APPOINTMENT (OUTPATIENT)
Dept: ULTRASOUND IMAGING | Facility: CLINIC | Age: 78
End: 2023-02-21
Payer: MEDICARE

## 2023-02-21 PROCEDURE — 76700 US EXAM ABDOM COMPLETE: CPT

## 2023-02-23 ENCOUNTER — NON-APPOINTMENT (OUTPATIENT)
Age: 78
End: 2023-02-23

## 2023-03-06 ENCOUNTER — APPOINTMENT (OUTPATIENT)
Dept: SURGERY | Facility: CLINIC | Age: 78
End: 2023-03-06
Payer: MEDICARE

## 2023-03-06 VITALS
OXYGEN SATURATION: 100 % | WEIGHT: 119 LBS | HEART RATE: 82 BPM | BODY MASS INDEX: 23.36 KG/M2 | HEIGHT: 60 IN | SYSTOLIC BLOOD PRESSURE: 143 MMHG | RESPIRATION RATE: 17 BRPM | DIASTOLIC BLOOD PRESSURE: 76 MMHG

## 2023-03-06 DIAGNOSIS — K82.4 CHOLESTEROLOSIS OF GALLBLADDER: ICD-10-CM

## 2023-03-06 DIAGNOSIS — K80.20 CALCULUS OF GALLBLADDER W/OUT CHOLECYSTITIS W/OUT OBSTRUCTION: ICD-10-CM

## 2023-03-06 PROCEDURE — 99204 OFFICE O/P NEW MOD 45 MIN: CPT

## 2023-03-06 RX ORDER — LANREOTIDE ACETATE 120 MG/.5ML
INJECTION SUBCUTANEOUS
Refills: 0 | Status: ACTIVE | COMMUNITY

## 2023-03-06 RX ORDER — PROCHLORPERAZINE MALEATE 5 MG/1
5 TABLET ORAL
Qty: 30 | Refills: 0 | Status: DISCONTINUED | COMMUNITY
Start: 2022-07-14 | End: 2023-03-06

## 2023-03-06 RX ORDER — FEXOFENADINE HCL 60 MG
CAPSULE ORAL
Refills: 0 | Status: DISCONTINUED | COMMUNITY
End: 2023-03-06

## 2023-03-06 RX ORDER — UBIDECARENONE 100 MG
100 CAPSULE ORAL
Refills: 0 | Status: DISCONTINUED | COMMUNITY
End: 2023-03-06

## 2023-03-06 RX ORDER — TIZANIDINE 2 MG/1
2 TABLET ORAL EVERY 6 HOURS
Qty: 24 | Refills: 1 | Status: DISCONTINUED | COMMUNITY
Start: 2022-04-18 | End: 2023-03-06

## 2023-03-06 RX ORDER — PREDNISONE 50 MG/1
50 TABLET ORAL
Qty: 3 | Refills: 0 | Status: DISCONTINUED | COMMUNITY
Start: 2022-07-12 | End: 2023-03-06

## 2023-03-06 RX ORDER — CYCLOBENZAPRINE HYDROCHLORIDE 5 MG/1
5 TABLET, FILM COATED ORAL
Qty: 30 | Refills: 0 | Status: DISCONTINUED | COMMUNITY
Start: 2021-06-25 | End: 2023-03-06

## 2023-03-06 RX ORDER — FLUTICASONE PROPIONATE 50 UG/1
50 SPRAY, METERED NASAL
Qty: 1 | Refills: 5 | Status: DISCONTINUED | COMMUNITY
Start: 2021-03-01 | End: 2023-03-06

## 2023-03-06 RX ORDER — OXYCODONE 5 MG/1
5 TABLET ORAL
Qty: 10 | Refills: 0 | Status: DISCONTINUED | COMMUNITY
Start: 2022-07-14 | End: 2023-03-06

## 2023-03-06 RX ORDER — LEVOFLOXACIN 500 MG/1
500 TABLET, FILM COATED ORAL
Qty: 7 | Refills: 0 | Status: DISCONTINUED | COMMUNITY
Start: 2022-07-14 | End: 2023-03-06

## 2023-03-06 RX ORDER — ROSUVASTATIN CALCIUM 5 MG/1
5 TABLET, FILM COATED ORAL
Qty: 90 | Refills: 0 | Status: DISCONTINUED | COMMUNITY
Start: 2021-02-21 | End: 2023-03-06

## 2023-03-06 RX ORDER — ALPRAZOLAM 0.25 MG/1
0.25 TABLET ORAL
Qty: 20 | Refills: 0 | Status: DISCONTINUED | COMMUNITY
Start: 2021-12-15 | End: 2023-03-06

## 2023-03-06 RX ORDER — MOMETASONE FUROATE 1 MG/G
0.1 OINTMENT TOPICAL
Qty: 15 | Refills: 0 | Status: DISCONTINUED | COMMUNITY
Start: 2022-09-06 | End: 2023-03-06

## 2023-03-06 NOTE — CONSULT LETTER
[Dear  ___] : Dear  [unfilled], [Consult Letter:] : I had the pleasure of evaluating your patient, [unfilled]. [Please see my note below.] : Please see my note below. [Consult Closing:] : Thank you very much for allowing me to participate in the care of this patient.  If you have any questions, please do not hesitate to contact me. [Sincerely,] : Sincerely, [FreeTextEntry3] : Ian Monsalve M.D., F.A.C.S, F.A.S.C.R.S [DrMurtaza  ___] : Dr. CARPENTER [DrMurtaza ___] : Dr. CARPENTER

## 2023-03-06 NOTE — HISTORY OF PRESENT ILLNESS
[de-identified] : Tonya is a 79 y/o female here for consultation, gallbladder.\par \par Lab 02/02/23 - ALP 42 , Bilirubin 0.5 , ALT 16 , AST 31\par \par MR Abdomen 03/01/23 - Stable 2.0 cm segment 4A/8 viable metastasis.  Stable 0.9 cm segment 3 metastasis without central enhancement.  Stable 0.5 cm indeterminate segment 7 enhancing lesion.  No evidence of new metastases.  Cholelithiasis without acute cholecystitis.\par \par US Abdomen 02/21/23 - 2 left lobe liver cysts as seen on previous examination.  Right lobe liver hypoechoic solid mass is not seen previously.  Gallbladder contains several small calculi.  In addition a polypoid nodule is identified in the neck of the gallbladder.  This was not identified on previous examination.  Common bile duct and biliary radicles are normal.  Visualized portions of pancreas are within normal limits.  \par \par Colonoscopy 08/30/19 - Normal mucosa in the entire colon.  Diverticulosis of the descending colon.  Internal hemorrhoids.\par \par Today pt reports no pain. Has been having random upper quadrants pain that radiates to back for a few months, happens every day. Unsure is pain is from previous compression fracture of spine. Daily BMs, does sometimes have constipation. Occasional nausea but no vomiting. Denies fever and chills. Fair appetite, has been loosing weight recently. Not taking any anticoagulants. Found polyp on gallbladder years ago so has yearly scans. Hx of hysterectomy, removal of ovarian cysts, appendectomy, and abdominal sx for obstruction of small intestine.

## 2023-03-06 NOTE — PHYSICAL EXAM
[Normal Breath Sounds] : Normal breath sounds [Normal Heart Sounds] : normal heart sounds [No Rash or Lesion] : No rash or lesion [Alert] : alert [Oriented to Person] : oriented to person [Oriented to Place] : oriented to place [Oriented to Time] : oriented to time [Calm] : calm [Abdominal Masses] : No abdominal masses [No HSM] : no hepatosplenomegaly [de-identified] : WNL [de-identified] : WNL [de-identified] : Soft, nontender, no palpable masses or hernias.  Well-healed midline and Pfannenstiel incisions.  No jaundice or scleral icterus [de-identified] : WNL ROM [de-identified] : WNL

## 2023-03-06 NOTE — ASSESSMENT
[FreeTextEntry1] : In summary the patient has: Cholelithiasis and what appears to be a 1.6 cm gallbladder polyp on ultrasound.  She has occasional upper abdominal, discomfort radiating from her back.  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  She also has several metastatic lesions in her liver which have been stable status post embolization.  She is now on lanreotide.  Liver function tests are normal.  She has no symptoms of carcinoid syndrome.  I had a long conversation with her oncologist Dr. Benvaides he feels that her disease is stable and she has a reasonable life expectancy and because of this I have recommended a laparoscopic cholecystectomy.  I explained that the typical threshold for laparoscopic cholecystectomy for gallbladder polyps is polyp size greater than 1 cm.  I explained the risks benefits and alternatives of surgery to the patient and her  including the rare complications of bleeding infection possible need for an open procedure rare incidence of bile duct injury and postoperative loose bowel movements.  I also explained that there is a chance that her gallbladder polyp may represent an adherent stone.

## 2023-03-07 ENCOUNTER — APPOINTMENT (OUTPATIENT)
Dept: SURGERY | Facility: CLINIC | Age: 78
End: 2023-03-07
Payer: MEDICARE

## 2023-03-21 ENCOUNTER — NON-APPOINTMENT (OUTPATIENT)
Age: 78
End: 2023-03-21

## 2023-03-22 ENCOUNTER — OUTPATIENT (OUTPATIENT)
Dept: OUTPATIENT SERVICES | Facility: HOSPITAL | Age: 78
LOS: 1 days | End: 2023-03-22
Payer: MEDICARE

## 2023-03-22 VITALS
TEMPERATURE: 98 F | RESPIRATION RATE: 16 BRPM | OXYGEN SATURATION: 99 % | HEART RATE: 79 BPM | DIASTOLIC BLOOD PRESSURE: 81 MMHG | SYSTOLIC BLOOD PRESSURE: 125 MMHG | WEIGHT: 117.07 LBS | HEIGHT: 60 IN

## 2023-03-22 DIAGNOSIS — Z90.710 ACQUIRED ABSENCE OF BOTH CERVIX AND UTERUS: Chronic | ICD-10-CM

## 2023-03-22 DIAGNOSIS — Z01.818 ENCOUNTER FOR OTHER PREPROCEDURAL EXAMINATION: ICD-10-CM

## 2023-03-22 DIAGNOSIS — Z98.890 OTHER SPECIFIED POSTPROCEDURAL STATES: Chronic | ICD-10-CM

## 2023-03-22 DIAGNOSIS — D3A.8 OTHER BENIGN NEUROENDOCRINE TUMORS: ICD-10-CM

## 2023-03-22 DIAGNOSIS — Z90.49 ACQUIRED ABSENCE OF OTHER SPECIFIED PARTS OF DIGESTIVE TRACT: Chronic | ICD-10-CM

## 2023-03-22 DIAGNOSIS — Z98.89 OTHER SPECIFIED POSTPROCEDURAL STATES: Chronic | ICD-10-CM

## 2023-03-22 DIAGNOSIS — Z90.2 ACQUIRED ABSENCE OF LUNG [PART OF]: Chronic | ICD-10-CM

## 2023-03-22 DIAGNOSIS — E11.9 TYPE 2 DIABETES MELLITUS WITHOUT COMPLICATIONS: ICD-10-CM

## 2023-03-22 DIAGNOSIS — K82.4 CHOLESTEROLOSIS OF GALLBLADDER: ICD-10-CM

## 2023-03-22 DIAGNOSIS — Z90.89 ACQUIRED ABSENCE OF OTHER ORGANS: Chronic | ICD-10-CM

## 2023-03-22 LAB
A1C WITH ESTIMATED AVERAGE GLUCOSE RESULT: 6.4 % — HIGH (ref 4–5.6)
ALBUMIN SERPL ELPH-MCNC: 4.6 G/DL — SIGNIFICANT CHANGE UP (ref 3.3–5)
ALP SERPL-CCNC: 42 U/L — SIGNIFICANT CHANGE UP (ref 40–120)
ALT FLD-CCNC: 9 U/L — LOW (ref 10–45)
ANION GAP SERPL CALC-SCNC: 12 MMOL/L — SIGNIFICANT CHANGE UP (ref 5–17)
APTT BLD: 29.9 SEC — SIGNIFICANT CHANGE UP (ref 27.5–35.5)
AST SERPL-CCNC: 17 U/L — SIGNIFICANT CHANGE UP (ref 10–40)
BILIRUB SERPL-MCNC: 0.3 MG/DL — SIGNIFICANT CHANGE UP (ref 0.2–1.2)
BUN SERPL-MCNC: 17 MG/DL — SIGNIFICANT CHANGE UP (ref 7–23)
CALCIUM SERPL-MCNC: 9.9 MG/DL — SIGNIFICANT CHANGE UP (ref 8.4–10.5)
CHLORIDE SERPL-SCNC: 100 MMOL/L — SIGNIFICANT CHANGE UP (ref 96–108)
CO2 SERPL-SCNC: 25 MMOL/L — SIGNIFICANT CHANGE UP (ref 22–31)
CREAT SERPL-MCNC: 0.52 MG/DL — SIGNIFICANT CHANGE UP (ref 0.5–1.3)
EGFR: 96 ML/MIN/1.73M2 — SIGNIFICANT CHANGE UP
ESTIMATED AVERAGE GLUCOSE: 137 MG/DL — HIGH (ref 68–114)
GLUCOSE SERPL-MCNC: 85 MG/DL — SIGNIFICANT CHANGE UP (ref 70–99)
HCT VFR BLD CALC: 37.5 % — SIGNIFICANT CHANGE UP (ref 34.5–45)
HGB BLD-MCNC: 12.1 G/DL — SIGNIFICANT CHANGE UP (ref 11.5–15.5)
INR BLD: 0.99 RATIO — SIGNIFICANT CHANGE UP (ref 0.88–1.16)
MCHC RBC-ENTMCNC: 30.2 PG — SIGNIFICANT CHANGE UP (ref 27–34)
MCHC RBC-ENTMCNC: 32.3 GM/DL — SIGNIFICANT CHANGE UP (ref 32–36)
MCV RBC AUTO: 93.5 FL — SIGNIFICANT CHANGE UP (ref 80–100)
NRBC # BLD: 0 /100 WBCS — SIGNIFICANT CHANGE UP (ref 0–0)
PLATELET # BLD AUTO: 321 K/UL — SIGNIFICANT CHANGE UP (ref 150–400)
POTASSIUM SERPL-MCNC: 4.9 MMOL/L — SIGNIFICANT CHANGE UP (ref 3.5–5.3)
POTASSIUM SERPL-SCNC: 4.9 MMOL/L — SIGNIFICANT CHANGE UP (ref 3.5–5.3)
PROT SERPL-MCNC: 7.1 G/DL — SIGNIFICANT CHANGE UP (ref 6–8.3)
PROTHROM AB SERPL-ACNC: 11.4 SEC — SIGNIFICANT CHANGE UP (ref 10.5–13.4)
RBC # BLD: 4.01 M/UL — SIGNIFICANT CHANGE UP (ref 3.8–5.2)
RBC # FLD: 13.3 % — SIGNIFICANT CHANGE UP (ref 10.3–14.5)
SODIUM SERPL-SCNC: 137 MMOL/L — SIGNIFICANT CHANGE UP (ref 135–145)
WBC # BLD: 6.14 K/UL — SIGNIFICANT CHANGE UP (ref 3.8–10.5)
WBC # FLD AUTO: 6.14 K/UL — SIGNIFICANT CHANGE UP (ref 3.8–10.5)

## 2023-03-22 PROCEDURE — 83036 HEMOGLOBIN GLYCOSYLATED A1C: CPT

## 2023-03-22 PROCEDURE — 85730 THROMBOPLASTIN TIME PARTIAL: CPT

## 2023-03-22 PROCEDURE — 85027 COMPLETE CBC AUTOMATED: CPT

## 2023-03-22 PROCEDURE — 80053 COMPREHEN METABOLIC PANEL: CPT

## 2023-03-22 PROCEDURE — 85610 PROTHROMBIN TIME: CPT

## 2023-03-22 PROCEDURE — G0463: CPT

## 2023-03-22 RX ORDER — ROSUVASTATIN CALCIUM 5 MG/1
1 TABLET ORAL
Qty: 0 | Refills: 0 | DISCHARGE

## 2023-03-22 RX ORDER — CIPROFLOXACIN LACTATE 400MG/40ML
400 VIAL (ML) INTRAVENOUS ONCE
Refills: 0 | Status: DISCONTINUED | OUTPATIENT
Start: 2023-04-10 | End: 2023-04-24

## 2023-03-22 RX ORDER — AZELASTINE 137 UG/1
2 SPRAY, METERED NASAL
Qty: 0 | Refills: 0 | DISCHARGE

## 2023-03-22 RX ORDER — PSYLLIUM SEED (WITH DEXTROSE)
5 POWDER (GRAM) ORAL
Qty: 0 | Refills: 0 | DISCHARGE

## 2023-03-22 RX ORDER — FEXOFENADINE HCL 30 MG
1 TABLET ORAL
Qty: 0 | Refills: 0 | DISCHARGE

## 2023-03-22 RX ORDER — UBIDECARENONE 100 MG
1 CAPSULE ORAL
Qty: 0 | Refills: 0 | DISCHARGE

## 2023-03-22 RX ORDER — ALUMINUM ZIRCONIUM TRICHLOROHYDREX GLY 0.2 G/G
1 STICK TOPICAL
Qty: 0 | Refills: 0 | DISCHARGE

## 2023-03-22 RX ORDER — CHLORHEXIDINE GLUCONATE 213 G/1000ML
1 SOLUTION TOPICAL ONCE
Refills: 0 | Status: DISCONTINUED | OUTPATIENT
Start: 2023-04-10 | End: 2023-04-24

## 2023-03-22 RX ORDER — FLUTICASONE PROPIONATE 50 MCG
1 SPRAY, SUSPENSION NASAL
Qty: 0 | Refills: 0 | DISCHARGE

## 2023-03-22 NOTE — H&P PST ADULT - ASSESSMENT
DASI score: 6.45  DASI activity: Walking 30-45 minutes daily (on treadmill or outdoors), 1 flight of stairs, Grocery Shopping, ADLs  Loose teeth or denture: Denies

## 2023-03-22 NOTE — H&P PST ADULT - OTHER CARE PROVIDERS
Dr. Navi Green (Cardiologist) [phone] - Last visit 10/2023 for F/U; Dr. Jordin Benavides (Oncologist) [phone] - Last visit 3/3/23 for F/U

## 2023-03-22 NOTE — H&P PST ADULT - NSICDXPASTSURGICALHX_GEN_ALL_CORE_FT
PAST SURGICAL HISTORY:  H/O basal cell carcinoma excision 2015 groin    H/O breast biopsy     H/O lumpectomy (R) breast 2003    H/O ovarian cystectomy (R) 1981    History of ablation of neoplasm of liver     History of appendectomy     History of exploratory laparotomy     History of tonsillectomy     S/P appendectomy peritonitis 1980    S/P hysterectomy with oophorectomy     S/P lobectomy of lung     Status post Mohs surgery nose 2011    Status post phlebectomy RLE 2003

## 2023-03-22 NOTE — H&P PST ADULT - PROBLEM SELECTOR PLAN 2
HGA1C ordered and obtained at [facility]  FS on admit  Hold metformin x 24 hours with last dose on 4/8/23 PM

## 2023-03-22 NOTE — H&P PST ADULT - LAST ECHOCARDIOGRAM
3/4/2021 - routinely with cardiologist - Normal ventricular size and systolic function, left ventricular diastolic dysfunction (grade1) and mild aortic regurgitation - EF 55%

## 2023-03-22 NOTE — H&P PST ADULT - HISTORY OF PRESENT ILLNESS
78 year old female with PMH Former Smoker (2 PPD x 3 years; Quit ~ 1960), Glaucoma, R Breast CA s/p Lumpectomy and RT (2003), DMT2 (on metformin - dx 12/2022), RML Lung CA s/p R VATS and right middle lobectomy (2021), Several cysts of liver s/p biopsy revealed an endocrine tumor (10/2021 - on Lanreotide injection Q 4 weeks) s/p liver embolization (7/2022) and Suspicious ovarian cyst (benign) s/p total hysterectomy and oophorectomy reports that she has routine abdominal ultrasounds due to an incidental finding of a gallbladder polyp, which now revealed a gallbladder with several small calculi In addition, a polypoid nodule is identified in the neck of the gallbladder. Pt reports no abdominal pain today, but has noticed some intermittent RUQ abdominal discomfort that radiates to the back over the past 4 months that now occurs daily. She states she is nausea at times with no vomiting. She has had an unintentional weight loss of 8 lbs since starting metformin in December 2022 and a loss of appetite. Pt now presents to PST for scheduled Laparoscopic Cholecystectomy with Dr. Monsalve on 4/10/23.    Fully vaccinated with Covid Series  No recent hospitalizations over the last 3 months  Denies recent travel, exposure or Covid symptoms  No Covid + infections in the past    78 year old female with PMH Former Smoker (2 PPD x 3 years; Quit ~ 1960), Glaucoma, R Breast CA s/p Lumpectomy and RT (2003), DMT2 (on metformin - dx 12/2022), RML Lung CA s/p R VATS and right middle lobectomy (2021 - No CT/RT), Several cysts of liver s/p biopsy revealed an endocrine tumor (10/2021 - on Lanreotide injection Q 4 weeks) s/p liver embolization (7/2022) and Suspicious ovarian cyst (benign) s/p total hysterectomy and oophorectomy reports that she has routine abdominal ultrasounds due to an incidental finding of a gallbladder polyp, which now revealed a gallbladder with several small calculi and a new polypoid nodule was identified in the neck of the gallbladder. Pt reports no abdominal pain today, but has noticed some intermittent RUQ abdominal discomfort that radiates to the back over the past 4 months that now occurs daily. She states she is nauseous at times with no vomiting. She has had an unintentional weight loss of 8 lbs since starting metformin in December 2022 and a loss of appetite over the past few months. Pt now presents to PST for scheduled Laparoscopic Cholecystectomy with Dr. Monsalve on 4/10/23.    Fully vaccinated with Covid Series  No recent hospitalizations over the last 3 months  Denies recent travel, exposure or Covid symptoms  No Covid + infections in the past

## 2023-03-22 NOTE — H&P PST ADULT - NSICDXPASTMEDICALHX_GEN_ALL_CORE_FT
PAST MEDICAL HISTORY:  Adhesion of intestine     Basal cell carcinoma of nose     Breast cancer (R) 2003 lumpectomy    Cholesterolosis of gallbladder     Constipation     GERD (gastroesophageal reflux disease)     Glaucoma     History of peritonitis     History of proctitis     Lung cancer     Osteoporosis     Ruptured appendicitis     S/P Mohs surgery for basal cell carcinoma     S/p small bowel obstruction     Urinary incontinence in female      PAST MEDICAL HISTORY:  Adhesion of intestine     Basal cell carcinoma of nose     Breast cancer (R) 2003 lumpectomy    Cholesterolosis of gallbladder     Constipation     GERD (gastroesophageal reflux disease)     Glaucoma     History of peritonitis     History of proctitis     Lung cancer     Mild aortic regurgitation     Osteoporosis     Ruptured appendicitis     S/P Mohs surgery for basal cell carcinoma     S/p small bowel obstruction     Urinary incontinence in female

## 2023-03-22 NOTE — H&P PST ADULT - ADMIT DATE
Quality 226: Preventive Care And Screening: Tobacco Use: Screening And Cessation Intervention: Patient screened for tobacco use and is an ex/non-smoker Quality 110: Preventive Care And Screening: Influenza Immunization: Influenza immunization was not ordered or administered, reason not given Quality 431: Preventive Care And Screening: Unhealthy Alcohol Use - Screening: Patient screened for unhealthy alcohol use using a single question and scores less than 2 times per year Detail Level: Detailed Quality 130: Documentation Of Current Medications In The Medical Record: Current Medications Documented 22-Mar-2023

## 2023-04-01 PROBLEM — C44.311 BASAL CELL CARCINOMA OF SKIN OF NOSE: Chronic | Status: ACTIVE | Noted: 2023-03-22

## 2023-04-01 PROBLEM — Z87.19 PERSONAL HISTORY OF OTHER DISEASES OF THE DIGESTIVE SYSTEM: Chronic | Status: ACTIVE | Noted: 2023-03-22

## 2023-04-01 PROBLEM — C34.90 MALIGNANT NEOPLASM OF UNSPECIFIED PART OF UNSPECIFIED BRONCHUS OR LUNG: Chronic | Status: ACTIVE | Noted: 2023-03-22

## 2023-04-01 PROBLEM — Z98.890 OTHER SPECIFIED POSTPROCEDURAL STATES: Chronic | Status: ACTIVE | Noted: 2023-03-22

## 2023-04-01 PROBLEM — I35.1 NONRHEUMATIC AORTIC (VALVE) INSUFFICIENCY: Chronic | Status: ACTIVE | Noted: 2023-03-22

## 2023-04-01 PROBLEM — K82.4 CHOLESTEROLOSIS OF GALLBLADDER: Chronic | Status: ACTIVE | Noted: 2023-03-22

## 2023-04-01 PROBLEM — K35.32 ACUTE APPENDICITIS WITH PERFORATION, LOCALIZED PERITONITIS, AND GANGRENE, WITHOUT ABSCESS: Chronic | Status: ACTIVE | Noted: 2023-03-22

## 2023-04-09 ENCOUNTER — TRANSCRIPTION ENCOUNTER (OUTPATIENT)
Age: 78
End: 2023-04-09

## 2023-04-10 ENCOUNTER — APPOINTMENT (OUTPATIENT)
Dept: SURGERY | Facility: HOSPITAL | Age: 78
End: 2023-04-10
Payer: MEDICARE

## 2023-04-10 ENCOUNTER — TRANSCRIPTION ENCOUNTER (OUTPATIENT)
Age: 78
End: 2023-04-10

## 2023-04-10 ENCOUNTER — RESULT REVIEW (OUTPATIENT)
Age: 78
End: 2023-04-10

## 2023-04-10 ENCOUNTER — OUTPATIENT (OUTPATIENT)
Dept: OUTPATIENT SERVICES | Facility: HOSPITAL | Age: 78
LOS: 1 days | End: 2023-04-10
Payer: MEDICARE

## 2023-04-10 VITALS
TEMPERATURE: 98 F | DIASTOLIC BLOOD PRESSURE: 64 MMHG | HEART RATE: 83 BPM | SYSTOLIC BLOOD PRESSURE: 130 MMHG | RESPIRATION RATE: 18 BRPM | OXYGEN SATURATION: 99 %

## 2023-04-10 VITALS
WEIGHT: 117.07 LBS | HEIGHT: 60 IN | SYSTOLIC BLOOD PRESSURE: 121 MMHG | HEART RATE: 79 BPM | RESPIRATION RATE: 18 BRPM | DIASTOLIC BLOOD PRESSURE: 79 MMHG | OXYGEN SATURATION: 96 % | TEMPERATURE: 97 F

## 2023-04-10 DIAGNOSIS — Z98.890 OTHER SPECIFIED POSTPROCEDURAL STATES: Chronic | ICD-10-CM

## 2023-04-10 DIAGNOSIS — Z90.2 ACQUIRED ABSENCE OF LUNG [PART OF]: Chronic | ICD-10-CM

## 2023-04-10 DIAGNOSIS — Z98.89 OTHER SPECIFIED POSTPROCEDURAL STATES: Chronic | ICD-10-CM

## 2023-04-10 DIAGNOSIS — Z90.89 ACQUIRED ABSENCE OF OTHER ORGANS: Chronic | ICD-10-CM

## 2023-04-10 DIAGNOSIS — Z90.49 ACQUIRED ABSENCE OF OTHER SPECIFIED PARTS OF DIGESTIVE TRACT: Chronic | ICD-10-CM

## 2023-04-10 DIAGNOSIS — K82.4 CHOLESTEROLOSIS OF GALLBLADDER: ICD-10-CM

## 2023-04-10 DIAGNOSIS — Z90.710 ACQUIRED ABSENCE OF BOTH CERVIX AND UTERUS: Chronic | ICD-10-CM

## 2023-04-10 LAB
GLUCOSE BLDC GLUCOMTR-MCNC: 105 MG/DL — HIGH (ref 70–99)
GLUCOSE BLDC GLUCOMTR-MCNC: 120 MG/DL — HIGH (ref 70–99)

## 2023-04-10 PROCEDURE — 88304 TISSUE EXAM BY PATHOLOGIST: CPT | Mod: 26

## 2023-04-10 PROCEDURE — 88304 TISSUE EXAM BY PATHOLOGIST: CPT

## 2023-04-10 PROCEDURE — 82962 GLUCOSE BLOOD TEST: CPT

## 2023-04-10 PROCEDURE — 47562 LAPAROSCOPIC CHOLECYSTECTOMY: CPT

## 2023-04-10 PROCEDURE — C9399: CPT

## 2023-04-10 PROCEDURE — C1889: CPT

## 2023-04-10 DEVICE — LIGATING CLIPS WECK HEMOLOK POLYMER MEDIUM-LARGE (GREEN) 6: Type: IMPLANTABLE DEVICE | Status: FUNCTIONAL

## 2023-04-10 RX ORDER — FENTANYL CITRATE 50 UG/ML
50 INJECTION INTRAVENOUS
Refills: 0 | Status: DISCONTINUED | OUTPATIENT
Start: 2023-04-10 | End: 2023-04-10

## 2023-04-10 RX ORDER — ACETAMINOPHEN 500 MG
3 TABLET ORAL
Qty: 0 | Refills: 0 | DISCHARGE

## 2023-04-10 RX ORDER — ONDANSETRON 8 MG/1
4 TABLET, FILM COATED ORAL EVERY 4 HOURS
Refills: 0 | Status: DISCONTINUED | OUTPATIENT
Start: 2023-04-10 | End: 2023-04-10

## 2023-04-10 RX ORDER — ACETAMINOPHEN 500 MG
650 TABLET ORAL ONCE
Refills: 0 | Status: DISCONTINUED | OUTPATIENT
Start: 2023-04-10 | End: 2023-04-24

## 2023-04-10 RX ORDER — IBUPROFEN 200 MG
1 TABLET ORAL
Qty: 0 | Refills: 0 | DISCHARGE

## 2023-04-10 RX ORDER — TIMOLOL 0.5 %
1 DROPS OPHTHALMIC (EYE)
Qty: 0 | Refills: 0 | DISCHARGE

## 2023-04-10 RX ORDER — FENTANYL CITRATE 50 UG/ML
25 INJECTION INTRAVENOUS
Refills: 0 | Status: DISCONTINUED | OUTPATIENT
Start: 2023-04-10 | End: 2023-04-10

## 2023-04-10 RX ORDER — DENOSUMAB 60 MG/ML
0 INJECTION SUBCUTANEOUS
Qty: 0 | Refills: 0 | DISCHARGE

## 2023-04-10 RX ORDER — MILK THISTLE 150 MG
1 CAPSULE ORAL
Qty: 0 | Refills: 0 | DISCHARGE

## 2023-04-10 RX ORDER — CHOLECALCIFEROL (VITAMIN D3) 125 MCG
1 CAPSULE ORAL
Qty: 0 | Refills: 0 | DISCHARGE

## 2023-04-10 RX ORDER — MONTELUKAST 4 MG/1
1 TABLET, CHEWABLE ORAL
Qty: 0 | Refills: 0 | DISCHARGE

## 2023-04-10 RX ORDER — LANREOTIDE ACETATE 60 MG/.2ML
1 INJECTION SUBCUTANEOUS
Qty: 0 | Refills: 0 | DISCHARGE

## 2023-04-10 RX ORDER — DOCUSATE SODIUM 100 MG
4 CAPSULE ORAL
Qty: 0 | Refills: 0 | DISCHARGE

## 2023-04-10 RX ORDER — METFORMIN HYDROCHLORIDE 850 MG/1
1 TABLET ORAL
Qty: 0 | Refills: 0 | DISCHARGE

## 2023-04-10 RX ORDER — MESALAMINE 400 MG
4 TABLET, DELAYED RELEASE (ENTERIC COATED) ORAL
Qty: 0 | Refills: 0 | DISCHARGE

## 2023-04-10 RX ORDER — FAMOTIDINE 10 MG/ML
1 INJECTION INTRAVENOUS
Qty: 0 | Refills: 0 | DISCHARGE

## 2023-04-10 RX ORDER — LIDOCAINE HCL 20 MG/ML
0.2 VIAL (ML) INJECTION ONCE
Refills: 0 | Status: DISCONTINUED | OUTPATIENT
Start: 2023-04-10 | End: 2023-04-10

## 2023-04-10 RX ORDER — SODIUM CHLORIDE 9 MG/ML
3 INJECTION INTRAMUSCULAR; INTRAVENOUS; SUBCUTANEOUS EVERY 8 HOURS
Refills: 0 | Status: DISCONTINUED | OUTPATIENT
Start: 2023-04-10 | End: 2023-04-10

## 2023-04-10 RX ORDER — MESALAMINE 400 MG
0 TABLET, DELAYED RELEASE (ENTERIC COATED) ORAL
Qty: 0 | Refills: 0 | DISCHARGE

## 2023-04-10 RX ORDER — PSYLLIUM SEED (WITH DEXTROSE)
6 POWDER (GRAM) ORAL
Qty: 0 | Refills: 0 | DISCHARGE

## 2023-04-10 RX ORDER — OXYCODONE HYDROCHLORIDE 5 MG/1
1 TABLET ORAL
Qty: 8 | Refills: 0
Start: 2023-04-10

## 2023-04-10 RX ADMIN — ONDANSETRON 4 MILLIGRAM(S): 8 TABLET, FILM COATED ORAL at 11:34

## 2023-04-10 NOTE — BRIEF OPERATIVE NOTE - NSICDXBRIEFPROCEDURE_GEN_ALL_CORE_FT
PROCEDURES:  Laparoscopic cholecystectomy 10-Apr-2023 09:15:35  Drew Teresa  Lysis of adhesions of peritoneum 10-Apr-2023 09:16:15  Drew Teresa

## 2023-04-10 NOTE — ASU DISCHARGE PLAN (ADULT/PEDIATRIC) - NS MD DC FALL RISK RISK
For information on Fall & Injury Prevention, visit: [email or web address] OR  [email or web address] OR  https://www.cdc.gov/steadi/patient.html

## 2023-04-10 NOTE — PRE-ANESTHESIA EVALUATION ADULT - NSANTHADDINFOFT_GEN_ALL_CORE
Chart reviewed, including medical and cardiac workup. No evidence of ACS, ADHF, new or dangerous arrhyhtmia, new or critical valvular stenosis. Informed consent obtained, including all R/B/A. No

## 2023-04-10 NOTE — ASU PATIENT PROFILE, ADULT - NSICDXPASTMEDICALHX_GEN_ALL_CORE_FT
PAST MEDICAL HISTORY:  Adhesion of intestine     Basal cell carcinoma of nose     Breast cancer (R) 2003 lumpectomy    Cholesterolosis of gallbladder     Constipation     GERD (gastroesophageal reflux disease)     Glaucoma     History of peritonitis     History of proctitis     Lung cancer     Mild aortic regurgitation     Osteoporosis     Ruptured appendicitis     S/P Mohs surgery for basal cell carcinoma     S/p small bowel obstruction     Urinary incontinence in female

## 2023-04-10 NOTE — ASU DISCHARGE PLAN (ADULT/PEDIATRIC) - ACTIVITY LEVEL
for 2 weeks until cleared by Dr. Monsalve/No excercise/No heavy lifting/No sports/gym/No weight bearing/No tub baths

## 2023-04-10 NOTE — BRIEF OPERATIVE NOTE - OPERATION/FINDINGS
RUQ Jose entry, extensive lysis of adhesions from prior laparotomy.   Rest of laparoscopic cholecystectomy performed in the standard fashion.

## 2023-04-10 NOTE — ASU PREOP CHECKLIST - WAS PATIENT ON BETA BLOCKER?
Pt Name: BRANDI DIAZ    MRN: [id number]      No

## 2023-04-10 NOTE — ASU DISCHARGE PLAN (ADULT/PEDIATRIC) - CARE PROVIDER_API CALL
Ian Monsalve)  ColonRectal Surgery; Surgery  [address], [address]  [place] [address]  Phone: [phone]  Fax: [phone]  Follow Up Time: 2 weeks

## 2023-04-19 LAB — SURGICAL PATHOLOGY STUDY: SIGNIFICANT CHANGE UP

## 2023-04-24 ENCOUNTER — APPOINTMENT (OUTPATIENT)
Dept: SURGICAL ONCOLOGY | Facility: CLINIC | Age: 78
End: 2023-04-24
Payer: MEDICARE

## 2023-04-24 VITALS
RESPIRATION RATE: 16 BRPM | HEART RATE: 77 BPM | BODY MASS INDEX: 21.99 KG/M2 | DIASTOLIC BLOOD PRESSURE: 79 MMHG | SYSTOLIC BLOOD PRESSURE: 148 MMHG | OXYGEN SATURATION: 98 % | WEIGHT: 112 LBS | HEIGHT: 60 IN

## 2023-04-24 PROCEDURE — 99214 OFFICE O/P EST MOD 30 MIN: CPT

## 2023-04-24 NOTE — HISTORY OF PRESENT ILLNESS
[de-identified] : Tonya Siddiqui is a 78 year old female who presents to the office for a follow up visit. \par \par Past Hx: \par s/p RIGHT Breast Lumpectomy on 10/8/2003 for moderately differentiated infiltrating ductal carcinoma, followed by Radiation Therapy.  Completed course of Tamoxifen and Arimidex.\par BRCA NEGATIVE\par  \par Blood work done by Dr. Benavides (Med-Onc).  CA27.29 done in 11/2020 (13.8 U/mL)\par Colonoscopy 8/2019- Diverticulosis of the descending colon.  Internal hemorrhoids. \par \par Pt. is s/p right lobectomy under the care of Dr. Wilkins ([facility]) in 6/2021.  Final pathology reveled non small cell carcinoma, favor solid predominant adenocarcinoma. Negative nodes. Pt. did not need adjuvant chemo or RT. \par \par MRI Liver 10/6/2021- The indeterminate lesion seen within the left hepatic lobe on prior studies has slightly enlarged and must be reviewed with suspicion for neoplasia, particularly metastatic disease. \par \par PET CT 10/11/2021- IMP: 1) Since FDG PET 4/2021, interval right middle lobectomy, no new FDG avid suspicious lung nodule.   2) Stable background FDG uptake within the enlarging indeterminant segment 4a liver lesion, better characterized on MRI dated 10/6/2021, differential diagnosis includes low grade neoplastic versus early metastatic disease, less likely aggressive malignancy;  3) No evidence of FDG avid yessenia or distant metastases.  \par \par She is s/p ultrasound guided liver biopsy of segment 4A on 10/26/21. Final Path: Neuroendocrine tumor  \par \par MR liver 6/2022- 2 hepatic metastases, a 2.0 cm mass in segment 4, increased from 1.7 cm, and a 2.0 cm mass in segment 2/3- only faintly evident on prior study, previously 0.8 cm \par \par MR liver 8/2022- interval decrease of 1.7 cm mass in segment 8 w/ persistent central enhancement c/w viable tumor, slight decrease in size of a 0.6 cm lesion in segment 3. New 0.5 cm lesion in segment 7 - stable since 6/2022 but new from 10/2021\par \par Pt. is on Lanreotide under the care of Dr. Benavides. \par Pt. continues to experience right breast pain. \par \par B/L mammo/sono 10/2022- BIRADS 1\par \par Breast MRI 4/2023- BIRADS 1 \par \par MR abd 3/2023 - stable 2.0 cm segment 4A/8 mets, stable segment segment 3 9 mm mets, stable 5 mm indeterminate segment 7 enhancing lesion, no new mets \par \par Internist: Dr. Gonzalez

## 2023-04-24 NOTE — PHYSICAL EXAM
[de-identified] : healed right breast incision; no masses or nipple discharge \par  [FreeTextEntry1] : deferred

## 2023-04-24 NOTE — ASSESSMENT
[FreeTextEntry1] : IMP: \par JEANETTE - hx of infiltrating ductal carcinoma 2003 s/p right lumpectomy \par On no further adjuvant\par \par Pt. is s/p right lobectomy under the care of Dr. Wilkins ([facility]X) in 6/2021.  Final pathology reveled non small cell carcinoma, favor solid predominant adenocarcinoma. Negative nodes.  Pt. did not need adjuvant chemo or RT. \par \par - s/p ultrasound guided liver biopsy 10/26/21. Final Path: Neuroendocrine tumor - on Lanreotide (metastatic from lung) on Lanreotide \par \par B/L mammo/sono 10/2022- BIRADS 1 \par Breast MRI 4/2023- BIRADS 1 \par \par MR abd 3/2023 - stable 2.0 cm segment 4A/8 mets, stable segment segment 3 9 mm mets, stable 5 mm indeterminate segment 7 enhancing lesion, no new mets \par \par s/p laparoscopic cholecystectomy w/ Dr. Monsalve 4/10/2023 - benign path \par \par Plan:\par RTO Q6 months \par Mammo/sono October 2023\par Neuroendocrine tumor management by Dr. Benavides\par Breast MRI 4/2024 \par \par

## 2023-04-24 NOTE — CONSULT LETTER
[FreeTextEntry1] : I will keep you informed of my follow-up. [FreeTextEntry3] : Jose Sandhu MD FACS\par Chief of Surgical Oncology\par \par

## 2023-04-25 ENCOUNTER — APPOINTMENT (OUTPATIENT)
Dept: SURGERY | Facility: CLINIC | Age: 78
End: 2023-04-25
Payer: MEDICARE

## 2023-04-25 VITALS
TEMPERATURE: 97.1 F | RESPIRATION RATE: 17 BRPM | SYSTOLIC BLOOD PRESSURE: 138 MMHG | DIASTOLIC BLOOD PRESSURE: 70 MMHG | OXYGEN SATURATION: 95 % | HEART RATE: 93 BPM

## 2023-04-25 DIAGNOSIS — Z09 ENCOUNTER FOR FOLLOW-UP EXAMINATION AFTER COMPLETED TREATMENT FOR CONDITIONS OTHER THAN MALIGNANT NEOPLASM: ICD-10-CM

## 2023-04-25 PROCEDURE — 99024 POSTOP FOLLOW-UP VISIT: CPT

## 2023-04-25 RX ORDER — AZITHROMYCIN 250 MG/1
250 TABLET, FILM COATED ORAL
Qty: 6 | Refills: 0 | Status: DISCONTINUED | COMMUNITY
Start: 2023-01-11

## 2023-04-25 RX ORDER — ONDANSETRON 4 MG/1
4 TABLET, ORALLY DISINTEGRATING ORAL
Qty: 20 | Refills: 0 | Status: DISCONTINUED | COMMUNITY
Start: 2023-02-06

## 2023-04-25 RX ORDER — COVID-19 ANTIGEN TEST
KIT MISCELLANEOUS
Qty: 8 | Refills: 0 | Status: ACTIVE | COMMUNITY
Start: 2023-03-29

## 2023-04-25 NOTE — HISTORY OF PRESENT ILLNESS
[de-identified] : Tonya is a 78 year old female here for a post operative visit. S/P 04/10/23 Laparoscopic cholecystectomy  Lysis of adhesions of peritoneum.\par Pathology- Gallbladder, cholecystectomy: - Chronic cholecystitis with cholelithiasis.\par \par Today pt reports no pain. Pain managed with Tylenol and Motrin. Did have some shoulder pain a few days ago but has resolved. Denies drainage, bleeding, swelling, and redness of surgical incision. Did have nausea this morning but no vomiting. Denies fever and chills. Did have constipation for first few days, took MiraLAX, had some diarrhea last week but is now normal BM. Fluctuating appetite, normal diet. Has a metallic taste in her mouth. Still feeling tired. Not taking anticoagulants. \par \par

## 2023-04-25 NOTE — HISTORY OF PRESENT ILLNESS
[de-identified] : Tonya is a 78 year old female here for a post operative visit. S/P 04/10/23 Laparoscopic cholecystectomy  Lysis of adhesions of peritoneum.\par Pathology- Gallbladder, cholecystectomy: - Chronic cholecystitis with cholelithiasis.\par \par Today pt reports no pain. Pain managed with Tylenol and Motrin. Did have some shoulder pain a few days ago but has resolved. Denies drainage, bleeding, swelling, and redness of surgical incision. Did have nausea this morning but no vomiting. Denies fever and chills. Did have constipation for first few days, took MiraLAX, had some diarrhea last week but is now normal BM. Fluctuating appetite, normal diet. Has a metallic taste in her mouth. Still feeling tired. Not taking anticoagulants. \par \par

## 2023-04-25 NOTE — ASSESSMENT
[FreeTextEntry1] : In summary the patient doing well status post laparoscopic cholecystectomy.  I instructed her that she may resume her normal activities and diet as tolerated.  Pathology revealed chronic cholecystitis with cholelithiasis and no evidence of biliary polyp/neoplasia.

## 2023-04-25 NOTE — CONSULT LETTER
[Dear  ___] : Dear  [unfilled], [Consult Letter:] : I had the pleasure of evaluating your patient, [unfilled]. [Please see my note below.] : Please see my note below. [Consult Closing:] : Thank you very much for allowing me to participate in the care of this patient.  If you have any questions, please do not hesitate to contact me. [Sincerely,] : Sincerely, [FreeTextEntry3] : Ian oMnsalve M.D., F.A.C.S, F.A.S.C.R.S [DrMurtaza  ___] : Dr. CARPENTER [DrMurtaza ___] : Dr. CARPENTER

## 2023-05-10 ENCOUNTER — APPOINTMENT (OUTPATIENT)
Dept: ORTHOPEDIC SURGERY | Facility: CLINIC | Age: 78
End: 2023-05-10
Payer: MEDICARE

## 2023-05-10 VITALS
BODY MASS INDEX: 21.99 KG/M2 | HEIGHT: 60 IN | SYSTOLIC BLOOD PRESSURE: 128 MMHG | DIASTOLIC BLOOD PRESSURE: 78 MMHG | WEIGHT: 112 LBS

## 2023-05-10 PROCEDURE — 99214 OFFICE O/P EST MOD 30 MIN: CPT

## 2023-05-10 NOTE — ASSESSMENT
[FreeTextEntry1] : I had a lengthy discussion with the patient in regards to treatment plan and diagnosis. There are no red flag findings on imaging nor are there any red flag findings on clinical exam. Therefore we will proceed with a course of conservative treatment. This would include physical therapy/ home exercise program, Tylenol, NSAIDS as medically indicated. The patient will continue with physical therapy. The patient will follow-up with me in approximately 6 months. I encouraged the patient to follow-up sooner if there are any new or worsening symptoms.\par

## 2023-05-10 NOTE — HISTORY OF PRESENT ILLNESS
[de-identified] : 78 year old female who presents for follow-up evaluation of her lower back pain s/p [id number] fracture. Today, the patient reports she feels better relative to her previous appt. She reports having a gallbladder surgery that helped alleviate the radiating pain. However, she reports she is still experiencing pain that is exacerbated when she is sitting for long periods of time, but reports relief when applying heat. \par Denies any bowel/bladder incontinence. Denies any saddle anesthesia. \par \par 02/17/2023\par 78 y.o female presents for her f/u appt for her lower back pain. Patient reports her pain has been improving since the last visit but reports that the pain flares up when she is sitting for a long period of time and radiates to the anterior aspect of her hips. Patient reports she has been going to physical therapy which has been helping her sxs.  She denies any issues with bowel or bladder dysfunction or saddle anesthesia.\par \par 12/9/22\par This is a 77 year old female who is following up on her back pain. Patient is feeling better.She states the pain radiates to the middle of her back when she's standing for long periods of time but when she sits down it goes away .Patient did do physical therapy but states it makes her symptoms worse. We reviewed her MRI results. She denies any issues with bowel or bladder dysfunction or saddle anesthesia.\par \par 8/3/22\par Today the patient states that overall her back pain is improved.  She denies any severe radicular type pain.  She denies any saddle anesthesia.  She denies any bowel bladder issues.  She is able to do more more of her activities of daily living.  She did recently get a liver procedure for her cancer diagnosis.  She recovered well from this.\par \par 05/09/22\par Today the patient states that she is still dealing with some back pain.  She denies any severe radicular type pain.  She denies any bowel bladder issues.  She denies any saddle anesthesia.  Overall she is pleased with her improvement in symptoms.  She is however still dealing with some residual discomfort.  She is wearing her LSO brace for comfort.\par \par 04/20/22\par This is a 77-year-old female that is here today for evaluation of his low back pain.  On April 8 the patient had a fall.  Since that time she has been dealing with back pain.  She had 2 trips to the emergency room.  Eventually she had an MRI which did show an L1 compression fracture.  She denies any radiating type pain down her legs.  She does state that her pain has improved as compared to her first day of injury.  She denies any bowel bladder issues.  She denies any saddle anesthesia.  Of note the patient does have a complex history in relation to cancer as dictated in other portions of her medical chart.

## 2023-05-10 NOTE — PHYSICAL EXAM
[de-identified] : Lumbar Physical Exam\par \par Gait - Normal\par \par Station - Normal\par \par Sagittal balance - Normal\par \par Compensatory mechanism? - None\par \par Reflexes\par Patellar - normal\par Gastroc - normal\par Clonus - No\par \par Hip Exam - Normal\par \par Straight leg raise - none\par \par Pulses - 2+ dp/pt\par \par Range of motion - normal\par \par Sensation\par Sensation intact to light touch in L1, L2, L3, L4, L5 and S1 dermatomes bilaterally\par \par Motor \par 	IP	Quad	HS	TA	Gastroc	EHL\par Right	5/5	5/5	5/5	5/5	5/5	5/5\par Left	5/5	5/5	5/5	5/5	5/5	5/5  [de-identified] : Lumbar MRI reviewed\par L1 compression fracture\par Less than 50% vertebral body height loss\par No areas of critical central stenosis\par Orders of critical foraminal stenosis

## 2023-06-01 ENCOUNTER — OUTPATIENT (OUTPATIENT)
Dept: OUTPATIENT SERVICES | Facility: HOSPITAL | Age: 78
LOS: 1 days | End: 2023-06-01
Payer: MEDICARE

## 2023-06-01 ENCOUNTER — APPOINTMENT (OUTPATIENT)
Dept: CT IMAGING | Facility: IMAGING CENTER | Age: 78
End: 2023-06-01
Payer: MEDICARE

## 2023-06-01 DIAGNOSIS — Z90.89 ACQUIRED ABSENCE OF OTHER ORGANS: Chronic | ICD-10-CM

## 2023-06-01 DIAGNOSIS — Z98.890 OTHER SPECIFIED POSTPROCEDURAL STATES: Chronic | ICD-10-CM

## 2023-06-01 DIAGNOSIS — Z90.49 ACQUIRED ABSENCE OF OTHER SPECIFIED PARTS OF DIGESTIVE TRACT: Chronic | ICD-10-CM

## 2023-06-01 DIAGNOSIS — Z98.89 OTHER SPECIFIED POSTPROCEDURAL STATES: Chronic | ICD-10-CM

## 2023-06-01 DIAGNOSIS — Z90.2 ACQUIRED ABSENCE OF LUNG [PART OF]: Chronic | ICD-10-CM

## 2023-06-01 DIAGNOSIS — Z90.710 ACQUIRED ABSENCE OF BOTH CERVIX AND UTERUS: Chronic | ICD-10-CM

## 2023-06-01 DIAGNOSIS — C34.90 MALIGNANT NEOPLASM OF UNSPECIFIED PART OF UNSPECIFIED BRONCHUS OR LUNG: ICD-10-CM

## 2023-06-01 PROCEDURE — 71250 CT THORAX DX C-: CPT | Mod: 26,MH

## 2023-06-01 PROCEDURE — 71250 CT THORAX DX C-: CPT

## 2023-06-03 ENCOUNTER — NON-APPOINTMENT (OUTPATIENT)
Age: 78
End: 2023-06-03

## 2023-06-07 ENCOUNTER — APPOINTMENT (OUTPATIENT)
Dept: THORACIC SURGERY | Facility: CLINIC | Age: 78
End: 2023-06-07
Payer: MEDICARE

## 2023-06-07 VITALS
DIASTOLIC BLOOD PRESSURE: 70 MMHG | HEART RATE: 77 BPM | BODY MASS INDEX: 21.99 KG/M2 | OXYGEN SATURATION: 97 % | SYSTOLIC BLOOD PRESSURE: 119 MMHG | RESPIRATION RATE: 17 BRPM | WEIGHT: 112 LBS | HEIGHT: 60 IN

## 2023-06-07 PROCEDURE — 99214 OFFICE O/P EST MOD 30 MIN: CPT

## 2023-06-08 NOTE — HISTORY OF PRESENT ILLNESS
[FreeTextEntry1] : Ms. MARIFER BENTLEY, 78 year old female, former smoker (2 PPD x 3 years; Quit 1970), w/ hx of DM2, breast cancer s/p lumpectomy and radiation 2003, who was undergoing routine evaluation of her breast that revealed 0.9 cm RML LUNG LESION on MRI Breast 4/13/2021, with mildly avidity confirmed on PET/CT 4/16/21. The patient is otherwise in good health. In 2020, she was hospitalized at [facility] for chest pain and underwent a negative coronary angiogram. \par \par 6/3/2021-Now, s/p Bronchoscopy, right video thoracoscopy, right middle lobectomy, and mediastinal lymph node sampling on 6/3/21; path of RML revealed poorly differentiated non small cell carcinoma, favor solid predominant adenocarcinoma, 0.9 cm, 0/12 LN negative, pT1aN0.  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   \par \par CT Chest on 7/29/21: \par - s/p RMLobectomy \par - Mild nodular opacity along the chain sutures; Cluster of nodular opacities within the anterobasilar RLL. (series 2, image 69).\par -  Slight increase in subpleural opacity within the superior segment of the RLL (series 2, image 24-29)\par - Unchanged 5 mm basilar RLL nodule (series 2, image 82)\par - Unchanged 6 mm ROBERT nodule along the fissure (series 2, image 18)\par \par Of note, she has several cysts of her liver which have been known for a number of years. October, 2021, follow up MRI Liver demonstrating indeterminate lesion seen within the left hepatic lobe on prior studies has slightly enlarged and must be reviewed with suspicion for neoplasia, particularly metastatic disease. \par \par s/p Ultrasound guided liver biopsy of segment 4A on 10/26/21. Final Path: Neuroendocrine tumor \par \par PET CT 10/11/2021:\par - No new FDG avid suspicious lung nodule \par - Stable background FDG uptake within the enlarging indeterminate segment 4a liver lesion. \par - No evidence of FDG avid yessenia or distant metastases\par \par DOTATATE Scan on 12/2/2021: \par - Markedly increased DOTATATE uptake at the gastric fundus, SUV 12, involving 3 cm segment.\par - Heterogeneous distribution of the DOTATATE uptake in the liver with multiple punctate foci, SUV ranges between 7-8. The largest hypdense lesion measuring 2 x 1.8 cm is located at segment 8 (Image 151)\par - Focally increased DOTATATE uptake at the T1 vertebral body without correlating lytic or sclerotic lesion. 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  Case reviewed with patient. 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 Plan for IV Somatostatin Analogue. Further liver and spine workup to be conducted by Dr. Benavides\par \par CT on 6/6/22: \par - Post op changes\par - Small diffusely calcified right lower lobe nodule is unchanged\par - Stable, noncalcified sub-5 mm RLL nodule image (83 series 2) and fissural nodule  (image 14)\par - Small hiatal hernia\par - 1.7 cm hypodense right hepatic lobe lesion stable since 7/29/2021. Subcentimeter hypodensities in the right and left hepatic lobes are stable in size.\par - Diffuse qualitative osteopenia. \par - New, mild L1 vertebral body compression fracture with mild retropulsion of fracture fragment posteriorly into the spinal canal. \par - Stable wedging deformity of the T5 vertebral body. There are surgical clips in the right axilla\par  \par MRI Liver w/wo contrast done on 6/7/22 (ordered by Dr. Benavides).  The report notes 2 hepatic metastases, a 2.0 cm mass hepatic segment 4 increased from 1.7 cm. and a 2.0 cm mass in hepatis segment 2/3 which is only faintly evident on prior study, increased from approximately 0.8 cm on the prior study. S/p liver ablation in 07/2022 at [facility].\par \par She was treated with liver embolization in July 2022.  \par MRI Abdomen w/w/o contrast on 11/28/22 revealed:\par - stable 2 cm viable metastasis in segment 4A/8.\par - stable 0.5 cm metastasis in segment 3 which demonstrates no central enhancement\par - stable 0.5 indeterminate lesion in segment 7\par \par She is scheduled to follow with Dr. Benavides in 3 months.  \par \par Last follow up with Dr. Sandhu (shonda/onc) on 10/24/22 with plan to follow up in 6 months, not currently undergoing any treatment. \par \par CT Chest on 12/06/2022:\par - Status post right middle lobectomy as well as wedge resection in the right upper lobe.\par - Mucous/secretions are noted within few of the bronchi in the right lower lobe. \par - Stable 0.5 cm solid nodule in the right lower lobe.\par - Calcified nodule is noted in the right lower lobe. \par \par CT Chest on 06/01/2023:\par - Middle lobectomy and right upper lobe wedge resection. \par - Unchanged right lower lobe 0.5 cm solid nodule (2-83).\par - Right lower lobe calcified granuloma.\par - Unchanged small cyst in the left hepatic lobe and other incompletely characterized hypoattenuating liver lesions.\par - Lateral right breast surgical clips. Unchanged lateral right breast asymmetric nodular density not well characterized with CT (2-43). Unchanged L1 compression fracture and mild loss of height of T5.\par \par \par Patient presents today for 6 month follow up.

## 2023-06-08 NOTE — CONSULT LETTER
[Dear  ___] : Dear  [unfilled], [Courtesy Letter:] : I had the pleasure of seeing your patient, [unfilled], in my office today. [Please see my note below.] : Please see my note below. [Consult Closing:] : Thank you very much for allowing me to participate in the care of this patient.  If you have any questions, please do not hesitate to contact me. [Sincerely,] : Sincerely, [FreeTextEntry2] : Dr. Jean Elizabeth (PULM/ref)\par Dr. Jordin Lebron MD (ONC)\par Dr. Tawny Cedillo (PCP) \par \par  [FreeTextEntry3] : Vernon Wilkins MD, FACS \par , Division of Thoracic Surgery \par [facility] \par Chief, Thoracic Surgery \par [facility] \par Department of Cardiovascular & Thoracic Surgery \par  \par [facility] [employer] [facility]\par

## 2023-06-08 NOTE — ASSESSMENT
[FreeTextEntry1] : Ms. MARIFER BENTLEY, 78 year old female, former smoker (2 PPD x 3 years; Quit 1970), w/ hx of DM2, breast cancer s/p lumpectomy and radiation 2003, who was undergoing routine evaluation of her breast that revealed 0.9 cm RML LUNG LESION on MRI Breast 4/13/2021, with mildly avidity confirmed on PET/CT 4/16/21. The patient is otherwise in good health. In 2020, she was hospitalized at [facility] for chest pain and underwent a negative coronary angiogram. \par \par 6/3/2021-Now, s/p Bronchoscopy, right video thoracoscopy, right middle lobectomy, and mediastinal lymph node sampling on 6/3/21; path of RML revealed poorly differentiated non small cell carcinoma, favor solid predominant adenocarcinoma, 0.9 cm, 0/12 LN negative, pT1aN0.  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   \par \par She presents today for follow up.\par \par I have independently reviewed the medical records and imaging at the time of this office consultation. CT Chest reviewed with patient, stable findings MRI liver stable 3 mets. I recommend she returns to clinic in 6 months with CT Chest without contrast. Continue follow up and treatments with heme/onc. She is agreeable.\par \par Recommendations reviewed with patient during this office visit, and all questions answered; Patient instructed on the importance of follow up and verbalizes understanding. \par \par \par I, HILTON Dowd, personally performed the evaluation and management (E/M) services for this established patient. That E/M includes conducting the examination, assessing all new/exacerbated conditions, and establishing a new plan of care. Today, my ACP, Jorge Carrera NP, was here to observe my evaluation and management services for this new problem/exacerbated condition to be followed going forward.\par \par  \par  \par \par \par

## 2023-07-17 ENCOUNTER — NON-APPOINTMENT (OUTPATIENT)
Age: 78
End: 2023-07-17

## 2023-08-12 ENCOUNTER — APPOINTMENT (OUTPATIENT)
Dept: MRI IMAGING | Facility: IMAGING CENTER | Age: 78
End: 2023-08-12
Payer: MEDICARE

## 2023-08-12 ENCOUNTER — OUTPATIENT (OUTPATIENT)
Dept: OUTPATIENT SERVICES | Facility: HOSPITAL | Age: 78
LOS: 1 days | End: 2023-08-12
Payer: MEDICARE

## 2023-08-12 DIAGNOSIS — Z98.89 OTHER SPECIFIED POSTPROCEDURAL STATES: Chronic | ICD-10-CM

## 2023-08-12 DIAGNOSIS — Z90.89 ACQUIRED ABSENCE OF OTHER ORGANS: Chronic | ICD-10-CM

## 2023-08-12 DIAGNOSIS — Z98.890 OTHER SPECIFIED POSTPROCEDURAL STATES: Chronic | ICD-10-CM

## 2023-08-12 DIAGNOSIS — Z90.49 ACQUIRED ABSENCE OF OTHER SPECIFIED PARTS OF DIGESTIVE TRACT: Chronic | ICD-10-CM

## 2023-08-12 DIAGNOSIS — Z00.8 ENCOUNTER FOR OTHER GENERAL EXAMINATION: ICD-10-CM

## 2023-08-12 DIAGNOSIS — Z90.710 ACQUIRED ABSENCE OF BOTH CERVIX AND UTERUS: Chronic | ICD-10-CM

## 2023-08-12 DIAGNOSIS — Z90.2 ACQUIRED ABSENCE OF LUNG [PART OF]: Chronic | ICD-10-CM

## 2023-08-12 PROCEDURE — 72146 MRI CHEST SPINE W/O DYE: CPT | Mod: 26,MH

## 2023-08-12 PROCEDURE — 72148 MRI LUMBAR SPINE W/O DYE: CPT | Mod: 26,MH

## 2023-08-12 PROCEDURE — 72148 MRI LUMBAR SPINE W/O DYE: CPT | Mod: MH

## 2023-08-12 PROCEDURE — 72146 MRI CHEST SPINE W/O DYE: CPT | Mod: MH

## 2023-08-24 ENCOUNTER — APPOINTMENT (OUTPATIENT)
Dept: OBGYN | Facility: CLINIC | Age: 78
End: 2023-08-24
Payer: MEDICARE

## 2023-08-24 VITALS
HEIGHT: 60 IN | DIASTOLIC BLOOD PRESSURE: 77 MMHG | SYSTOLIC BLOOD PRESSURE: 145 MMHG | BODY MASS INDEX: 22.38 KG/M2 | WEIGHT: 114 LBS

## 2023-08-24 DIAGNOSIS — Z01.411 ENCOUNTER FOR GYNECOLOGICAL EXAMINATION (GENERAL) (ROUTINE) WITH ABNORMAL FINDINGS: ICD-10-CM

## 2023-08-24 DIAGNOSIS — N90.5 ATROPHY OF VULVA: ICD-10-CM

## 2023-08-24 DIAGNOSIS — Z90.79 ACQUIRED ABSENCE OF BOTH CERVIX AND UTERUS: ICD-10-CM

## 2023-08-24 DIAGNOSIS — Z90.722 ACQUIRED ABSENCE OF BOTH CERVIX AND UTERUS: ICD-10-CM

## 2023-08-24 DIAGNOSIS — M81.0 AGE-RELATED OSTEOPOROSIS W/OUT CURRENT PATHOLOGICAL FRACTURE: ICD-10-CM

## 2023-08-24 DIAGNOSIS — Z90.710 ACQUIRED ABSENCE OF BOTH CERVIX AND UTERUS: ICD-10-CM

## 2023-08-24 DIAGNOSIS — Z85.3 PERSONAL HISTORY OF MALIGNANT NEOPLASM OF BREAST: ICD-10-CM

## 2023-08-24 PROCEDURE — G0101: CPT | Mod: GA

## 2023-08-24 PROCEDURE — G0328 FECAL BLOOD SCRN IMMUNOASSAY: CPT | Mod: GA,QW

## 2023-10-10 ENCOUNTER — NON-APPOINTMENT (OUTPATIENT)
Age: 78
End: 2023-10-10

## 2023-10-10 PROBLEM — D3A.8 NEUROENDOCRINE TUMOR: Status: ACTIVE | Noted: 2022-10-20

## 2023-10-15 ENCOUNTER — NON-APPOINTMENT (OUTPATIENT)
Age: 78
End: 2023-10-15

## 2023-10-16 ENCOUNTER — APPOINTMENT (OUTPATIENT)
Dept: SURGICAL ONCOLOGY | Facility: CLINIC | Age: 78
End: 2023-10-16
Payer: MEDICARE

## 2023-10-16 VITALS
HEIGHT: 60 IN | WEIGHT: 109 LBS | DIASTOLIC BLOOD PRESSURE: 75 MMHG | SYSTOLIC BLOOD PRESSURE: 145 MMHG | BODY MASS INDEX: 21.4 KG/M2 | RESPIRATION RATE: 17 BRPM | HEART RATE: 78 BPM | OXYGEN SATURATION: 97 %

## 2023-10-16 DIAGNOSIS — Z85.3 ENCOUNTER FOR FOLLOW-UP EXAMINATION AFTER COMPLETED TREATMENT FOR MALIGNANT NEOPLASM: ICD-10-CM

## 2023-10-16 DIAGNOSIS — Z08 ENCOUNTER FOR FOLLOW-UP EXAMINATION AFTER COMPLETED TREATMENT FOR MALIGNANT NEOPLASM: ICD-10-CM

## 2023-10-16 DIAGNOSIS — D3A.8 OTHER BENIGN NEUROENDOCRINE TUMORS: ICD-10-CM

## 2023-10-16 PROCEDURE — 99214 OFFICE O/P EST MOD 30 MIN: CPT

## 2023-10-26 ENCOUNTER — NON-APPOINTMENT (OUTPATIENT)
Age: 78
End: 2023-10-26

## 2023-10-27 ENCOUNTER — OUTPATIENT (OUTPATIENT)
Dept: OUTPATIENT SERVICES | Facility: HOSPITAL | Age: 78
LOS: 1 days | End: 2023-10-27
Payer: MEDICARE

## 2023-10-27 ENCOUNTER — APPOINTMENT (OUTPATIENT)
Dept: PULMONOLOGY | Facility: CLINIC | Age: 78
End: 2023-10-27
Payer: MEDICARE

## 2023-10-27 ENCOUNTER — APPOINTMENT (OUTPATIENT)
Dept: RADIOLOGY | Facility: IMAGING CENTER | Age: 78
End: 2023-10-27

## 2023-10-27 VITALS
RESPIRATION RATE: 14 BRPM | TEMPERATURE: 97.5 F | WEIGHT: 111.25 LBS | SYSTOLIC BLOOD PRESSURE: 146 MMHG | HEIGHT: 60 IN | DIASTOLIC BLOOD PRESSURE: 69 MMHG | OXYGEN SATURATION: 98 % | BODY MASS INDEX: 21.84 KG/M2 | HEART RATE: 81 BPM

## 2023-10-27 DIAGNOSIS — Z98.890 OTHER SPECIFIED POSTPROCEDURAL STATES: Chronic | ICD-10-CM

## 2023-10-27 DIAGNOSIS — Z90.89 ACQUIRED ABSENCE OF OTHER ORGANS: Chronic | ICD-10-CM

## 2023-10-27 DIAGNOSIS — Z90.2 ACQUIRED ABSENCE OF LUNG [PART OF]: Chronic | ICD-10-CM

## 2023-10-27 DIAGNOSIS — Z98.89 OTHER SPECIFIED POSTPROCEDURAL STATES: Chronic | ICD-10-CM

## 2023-10-27 DIAGNOSIS — J98.11 ATELECTASIS: ICD-10-CM

## 2023-10-27 DIAGNOSIS — Z90.49 ACQUIRED ABSENCE OF OTHER SPECIFIED PARTS OF DIGESTIVE TRACT: Chronic | ICD-10-CM

## 2023-10-27 DIAGNOSIS — Z90.710 ACQUIRED ABSENCE OF BOTH CERVIX AND UTERUS: Chronic | ICD-10-CM

## 2023-10-27 PROCEDURE — 99214 OFFICE O/P EST MOD 30 MIN: CPT

## 2023-10-27 PROCEDURE — 71046 X-RAY EXAM CHEST 2 VIEWS: CPT

## 2023-10-27 PROCEDURE — 71046 X-RAY EXAM CHEST 2 VIEWS: CPT | Mod: 26

## 2023-10-27 RX ORDER — AZELASTINE HYDROCHLORIDE AND FLUTICASONE PROPIONATE 137; 50 UG/1; UG/1
137-50 SPRAY, METERED NASAL
Qty: 1 | Refills: 3 | Status: ACTIVE | COMMUNITY
Start: 2023-10-27

## 2023-10-30 ENCOUNTER — APPOINTMENT (OUTPATIENT)
Dept: ALLERGY | Facility: CLINIC | Age: 78
End: 2023-10-30
Payer: MEDICARE

## 2023-10-30 VITALS
BODY MASS INDEX: 21.79 KG/M2 | OXYGEN SATURATION: 98 % | SYSTOLIC BLOOD PRESSURE: 138 MMHG | HEIGHT: 60 IN | DIASTOLIC BLOOD PRESSURE: 58 MMHG | HEART RATE: 104 BPM | WEIGHT: 111 LBS

## 2023-10-30 VITALS
HEIGHT: 60 IN | SYSTOLIC BLOOD PRESSURE: 138 MMHG | TEMPERATURE: 97.9 F | BODY MASS INDEX: 21.79 KG/M2 | OXYGEN SATURATION: 98 % | WEIGHT: 111 LBS | DIASTOLIC BLOOD PRESSURE: 58 MMHG | HEART RATE: 104 BPM

## 2023-10-30 DIAGNOSIS — J45.909 UNSPECIFIED ASTHMA, UNCOMPLICATED: ICD-10-CM

## 2023-10-30 PROCEDURE — 95004 PERQ TESTS W/ALRGNC XTRCS: CPT

## 2023-10-30 PROCEDURE — 99204 OFFICE O/P NEW MOD 45 MIN: CPT | Mod: 25

## 2023-10-30 RX ORDER — FAMOTIDINE 40 MG/1
40 TABLET, FILM COATED ORAL
Qty: 60 | Refills: 2 | Status: ACTIVE | COMMUNITY
Start: 2023-10-30 | End: 1900-01-01

## 2023-11-08 ENCOUNTER — APPOINTMENT (OUTPATIENT)
Dept: ALLERGY | Facility: CLINIC | Age: 78
End: 2023-11-08
Payer: MEDICARE

## 2023-11-08 PROCEDURE — 99213 OFFICE O/P EST LOW 20 MIN: CPT | Mod: 25

## 2023-11-08 PROCEDURE — 95018 ALL TSTG PERQ&IQ DRUGS/BIOL: CPT

## 2023-11-27 ENCOUNTER — APPOINTMENT (OUTPATIENT)
Dept: CT IMAGING | Facility: IMAGING CENTER | Age: 78
End: 2023-11-27
Payer: MEDICARE

## 2023-11-27 ENCOUNTER — OUTPATIENT (OUTPATIENT)
Dept: OUTPATIENT SERVICES | Facility: HOSPITAL | Age: 78
LOS: 1 days | End: 2023-11-27
Payer: MEDICARE

## 2023-11-27 DIAGNOSIS — Z98.89 OTHER SPECIFIED POSTPROCEDURAL STATES: Chronic | ICD-10-CM

## 2023-11-27 DIAGNOSIS — Z98.890 OTHER SPECIFIED POSTPROCEDURAL STATES: Chronic | ICD-10-CM

## 2023-11-27 DIAGNOSIS — C80.1 MALIGNANT (PRIMARY) NEOPLASM, UNSPECIFIED: ICD-10-CM

## 2023-11-27 DIAGNOSIS — Z90.49 ACQUIRED ABSENCE OF OTHER SPECIFIED PARTS OF DIGESTIVE TRACT: Chronic | ICD-10-CM

## 2023-11-27 DIAGNOSIS — Z90.2 ACQUIRED ABSENCE OF LUNG [PART OF]: Chronic | ICD-10-CM

## 2023-11-27 DIAGNOSIS — Z90.710 ACQUIRED ABSENCE OF BOTH CERVIX AND UTERUS: Chronic | ICD-10-CM

## 2023-11-27 DIAGNOSIS — Z90.89 ACQUIRED ABSENCE OF OTHER ORGANS: Chronic | ICD-10-CM

## 2023-11-27 DIAGNOSIS — C7A.090 MALIGNANT CARCINOID TUMOR OF THE BRONCHUS AND LUNG: ICD-10-CM

## 2023-11-27 PROCEDURE — 71250 CT THORAX DX C-: CPT

## 2023-11-27 PROCEDURE — 71250 CT THORAX DX C-: CPT | Mod: 26,MH

## 2023-12-06 ENCOUNTER — APPOINTMENT (OUTPATIENT)
Dept: THORACIC SURGERY | Facility: CLINIC | Age: 78
End: 2023-12-06
Payer: MEDICARE

## 2023-12-06 ENCOUNTER — NON-APPOINTMENT (OUTPATIENT)
Age: 78
End: 2023-12-06

## 2023-12-06 VITALS
WEIGHT: 109 LBS | HEART RATE: 82 BPM | DIASTOLIC BLOOD PRESSURE: 65 MMHG | OXYGEN SATURATION: 100 % | BODY MASS INDEX: 21.4 KG/M2 | RESPIRATION RATE: 16 BRPM | SYSTOLIC BLOOD PRESSURE: 142 MMHG | HEIGHT: 60 IN

## 2023-12-06 DIAGNOSIS — C7A.090 MALIGNANT CARCINOID TUMOR OF THE BRONCHUS AND LUNG: ICD-10-CM

## 2023-12-06 PROCEDURE — 99213 OFFICE O/P EST LOW 20 MIN: CPT

## 2023-12-20 ENCOUNTER — APPOINTMENT (OUTPATIENT)
Dept: PHYSICAL MEDICINE AND REHAB | Facility: CLINIC | Age: 78
End: 2023-12-20
Payer: MEDICARE

## 2023-12-20 DIAGNOSIS — R63.0 ANOREXIA: ICD-10-CM

## 2023-12-20 PROCEDURE — 99213 OFFICE O/P EST LOW 20 MIN: CPT

## 2023-12-20 NOTE — PHYSICAL EXAM
[FreeTextEntry1] : Gen: Patient is A&O x 3, NAD HEENT: EOMI, hearing grossly normal Resp: regular, non - labored CV: pulses regular Skin: no rashes, erythema Lymph: no clubbing, cyanosis, edema Inspection: no instability  Strength: anti-gravity  Gait: normal, non-antalgic

## 2023-12-20 NOTE — HISTORY OF PRESENT ILLNESS
[FreeTextEntry1] : Mr. Siddiqui is a 78 year old female with past medical history of breast cancer diagnosed in 2003 s/p lumpectomy and radiation, found to have lung lesion and is s/p Bronchoscopy, right video thoracoscopy, right middle lobectomy, and mediastinal lymph node sampling on 6/3/21.  S/p ultrasound guided liver biopsy 10/26/21.  S/p ablation. 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    She reports that she has had episodes of nausea related to her cancer treatment.  She states it has been severe and has had difficulty with eating.  Reports she still has chronic pain in back region for which she is being followed with MRIs.

## 2023-12-20 NOTE — ASSESSMENT
[FreeTextEntry1] : 78 year old female presenting for evaluation.  #Neuroendocrine tumor/Nausea/Decreased appetite: -Surgical oncology notes reviewed, on lanreotide -Discussed risks and benefits of medical cannabis, discussed risks for transaminitis, she reports she has regular lab work for evaluation -Medical cannabis certification completed today. Provided cannabis education, overview of state program, and discussed adverse effects in detail. Counseled that vaporized cannabis is not the preferred route of administration due to the fact that both short-term and long-term risks associated with vaporizing oils are not yet fully understood. Recommend starting with 1:1 THC:CBD formulation at low dose of THC (~2mg/dose). -I Stop # [id number]  Follow up in 6 weeks.

## 2024-01-31 ENCOUNTER — NON-APPOINTMENT (OUTPATIENT)
Age: 79
End: 2024-01-31

## 2024-04-11 ENCOUNTER — APPOINTMENT (OUTPATIENT)
Dept: SURGICAL ONCOLOGY | Facility: CLINIC | Age: 79
End: 2024-04-11
Payer: MEDICARE

## 2024-04-11 VITALS
BODY MASS INDEX: 21.4 KG/M2 | SYSTOLIC BLOOD PRESSURE: 147 MMHG | HEART RATE: 103 BPM | WEIGHT: 109 LBS | DIASTOLIC BLOOD PRESSURE: 80 MMHG | HEIGHT: 60 IN | OXYGEN SATURATION: 99 %

## 2024-04-11 DIAGNOSIS — N60.91 UNSPECIFIED BENIGN MAMMARY DYSPLASIA OF RIGHT BREAST: ICD-10-CM

## 2024-04-11 PROCEDURE — 99214 OFFICE O/P EST MOD 30 MIN: CPT

## 2024-04-11 NOTE — ASSESSMENT
[FreeTextEntry1] : IMP:  JEANETTE - hx of infiltrating ductal carcinoma 2003 s/p right lumpectomy, RT & endocrine therapy (on no further adjuvant)  Pt. is s/p right lobectomy under the care of Dr. Wilkins ([facility]) in 6/2021.  Final pathology reveled non small cell carcinoma, favor solid predominant adenocarcinoma. Negative nodes.  Pt. did not need adjuvant chemo or RT.  - s/p ultrasound guided liver biopsy 10/26/21. 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 Benavides

## 2024-04-11 NOTE — HISTORY OF PRESENT ILLNESS
[de-identified] : Tonya Siddiqui is a 79 year old female who presents to the office for a follow up visit.   Past Hx:  s/p RIGHT Breast Lumpectomy on 10/8/2003 for moderately differentiated infiltrating ductal carcinoma, followed by Radiation Therapy.  Completed course of Tamoxifen and Arimidex. BRCA NEGATIVE   Colonoscopy 8/2019- Diverticulosis of the descending colon.  Internal hemorrhoids.   Pt. is s/p right lobectomy under the care of Dr. Wilkins ([facility]) in 6/2021.  Final pathology reveled non-small cell carcinoma, favor solid predominant adenocarcinoma. Negative nodes. Pt. did not need adjuvant chemo or RT.   PET CT 10/11/2021- IMP: 1) Since FDG PET 4/2021, interval right middle lobectomy, no new FDG avid suspicious lung nodule.   2) Stable background FDG uptake within the enlarging indeterminant segment 4a liver lesion, better characterized on MRI dated 10/6/2021, differential diagnosis includes low grade neoplastic versus early metastatic disease, less likely aggressive malignancy;  3) No evidence of FDG avid yessenia or distant metastases.    She is s/p ultrasound guided liver biopsy of segment 4A on 10/26/21. Final Path: Neuroendocrine tumor    MR liver 8/2022- interval decrease of 1.7 cm mass in segment 8 w/ persistent central enhancement c/w viable tumor, slight decrease in size of a 0.6 cm lesion in segment 3. New 0.5 cm lesion in segment 7 - stable since 6/2022 but new from 10/2021  Pt. is on Lanreotide under the care of Dr. Benavides.  Pt. continues to experience right breast pain.   s/p laparoscopic cholecystectomy w/ Dr. Monsalve 4/10/2023 - benign path   Breast MRI 4/2023- BIRADS 1   MR abdomen 7/2023 -  - similar to marginally increased size of the viable segment 4A/8 metastasis and stable Non enhancing segment III mets - stable indeterminant segment 7 enhancing lesion - no evidence of new abd mets  B/L mammo/sono 10/2023 - BIRADS 1   B/L Breast MRI 4/2024: BIRADS 1   Internist: Dr. Gonzalez

## 2024-04-11 NOTE — REVIEW OF SYSTEMS
[Negative] : Heme/Lymph [FreeTextEntry6] : see HPI [de-identified] : see HPI [de-identified] : see HPI

## 2024-04-11 NOTE — CONSULT LETTER
[Dear  ___] : Dear  [unfilled], [Courtesy Letter:] : I had the pleasure of seeing your patient, [unfilled], in my office today. [Please see my note below.] : Please see my note below. [Consult Closing:] : Thank you very much for allowing me to participate in the care of this patient.  If you have any questions, please do not hesitate to contact me. [Sincerely,] : Sincerely, [DrMurtaza  ___] : Dr. CARPENTER [FreeTextEntry1] : I will keep you informed of my follow-up. [FreeTextEntry3] : Jose Sandhu MD FACS\par  Chief of Surgical Oncology\par  \par

## 2024-04-11 NOTE — PHYSICAL EXAM
[Normal] : supple, no neck mass and thyroid not enlarged [Normal Supraclavicular Lymph Nodes] : normal supraclavicular lymph nodes [Normal Axillary Lymph Nodes] : normal axillary lymph nodes [Normal] : oriented to person, place and time, with appropriate affect [de-identified] : no masses or discharge

## 2024-05-10 PROBLEM — M54.50 LOW BACK PAIN: Status: ACTIVE | Noted: 2021-09-01

## 2024-05-13 ENCOUNTER — APPOINTMENT (OUTPATIENT)
Dept: ORTHOPEDIC SURGERY | Facility: CLINIC | Age: 79
End: 2024-05-13
Payer: MEDICARE

## 2024-05-13 DIAGNOSIS — M54.50 LOW BACK PAIN, UNSPECIFIED: ICD-10-CM

## 2024-05-13 PROCEDURE — 99214 OFFICE O/P EST MOD 30 MIN: CPT

## 2024-05-13 NOTE — ASSESSMENT
[FreeTextEntry1] : I had a lengthy discussion with the patient in regards to treatment plan and diagnosis. There are no red flag findings on imaging nor are there any red flag findings on clinical exam.  Therefore we will proceed with a course of conservative treatment.  This would include physical therapy/home exercise program, Tylenol as medically indicated.  The patient will follow up with me in approximately 4 weeks.  I encouraged the patient to follow-up sooner if there are any new or worsening symptoms.

## 2024-05-13 NOTE — PHYSICAL EXAM
[de-identified] : Lumbar Physical Exam  Gait - Normal  Station - Normal  Sagittal balance - Normal  Compensatory mechanism? - None  Reflexes Patellar - normal Gastroc - normal Clonus - No  Hip Exam - Normal  Straight leg raise - none  Pulses - 2+ dp/pt  Range of motion - normal  Sensation Sensation intact to light touch in L1, L2, L3, L4, L5 and S1 dermatomes bilaterally  Motor  	IP	Quad	HS	TA	Gastroc	EHL Right	5/5	5/5	5/5	5/5	5/5	5/5 Left	5/5	5/5	5/5	5/5	5/5	5/5  [de-identified] : New lumbar MRI and thoracic MRI were reviewed T1 vertebral body lesion stable as compared to previous MRIs Compression deformities noted in the thoracic MRI.  These are stable No areas of critical central or foraminal stenosis

## 2024-05-13 NOTE — HISTORY OF PRESENT ILLNESS
[de-identified] : 80 yo female seen a year ago for an L1 compression fracture.  Today the patient states that she does have diffuse left sided back pain.  She denies any radicular pain.  She denies any new issues with balance or head dexterity.  She is here to go over MRI results.  She is able to do the majority of her actives of daily living although she does have back pain from time to time which can be significant.  5/10/2023 78 year old female who presents for follow-up evaluation of her lower back pain s/p [id number] fracture. Today, the patient reports she feels better relative to her previous appt. She reports having a gallbladder surgery that helped alleviate the radiating pain. However, she reports she is still experiencing pain that is exacerbated when she is sitting for long periods of time, but reports relief when applying heat.  Denies any bowel/bladder incontinence. Denies any saddle anesthesia.   02/17/2023 78 y.o female presents for her f/u appt for her lower back pain. Patient reports her pain has been improving since the last visit but reports that the pain flares up when she is sitting for a long period of time and radiates to the anterior aspect of her hips. Patient reports she has been going to physical therapy which has been helping her sxs.  She denies any issues with bowel or bladder dysfunction or saddle anesthesia.  12/9/22 This is a 77 year old female who is following up on her back pain. Patient is feeling better.She states the pain radiates to the middle of her back when she's standing for long periods of time but when she sits down it goes away .Patient did do physical therapy but states it makes her symptoms worse. We reviewed her MRI results. She denies any issues with bowel or bladder dysfunction or saddle anesthesia.  8/3/22 Today the patient states that overall her back pain is improved.  She denies any severe radicular type pain.  She denies any saddle anesthesia.  She denies any bowel bladder issues.  She is able to do more more of her activities of daily living.  She did recently get a liver procedure for her cancer diagnosis.  She recovered well from this.  05/09/22 Today the patient states that she is still dealing with some back pain.  She denies any severe radicular type pain.  She denies any bowel bladder issues.  She denies any saddle anesthesia.  Overall she is pleased with her improvement in symptoms.  She is however still dealing with some residual discomfort.  She is wearing her LSO brace for comfort.  04/20/22 This is a 77-year-old female that is here today for evaluation of his low back pain.  On April 8 the patient had a fall.  Since that time she has been dealing with back pain.  She had 2 trips to the emergency room.  Eventually she had an MRI which did show an L1 compression fracture.  She denies any radiating type pain down her legs.  She does state that her pain has improved as compared to her first day of injury.  She denies any bowel bladder issues.  She denies any saddle anesthesia.  Of note the patient does have a complex history in relation to cancer as dictated in other portions of her medical chart.

## 2024-05-28 ENCOUNTER — APPOINTMENT (OUTPATIENT)
Dept: CT IMAGING | Facility: IMAGING CENTER | Age: 79
End: 2024-05-28
Payer: MEDICARE

## 2024-05-28 ENCOUNTER — OUTPATIENT (OUTPATIENT)
Dept: OUTPATIENT SERVICES | Facility: HOSPITAL | Age: 79
LOS: 1 days | End: 2024-05-28
Payer: MEDICARE

## 2024-05-28 DIAGNOSIS — Z90.49 ACQUIRED ABSENCE OF OTHER SPECIFIED PARTS OF DIGESTIVE TRACT: Chronic | ICD-10-CM

## 2024-05-28 DIAGNOSIS — Z98.89 OTHER SPECIFIED POSTPROCEDURAL STATES: Chronic | ICD-10-CM

## 2024-05-28 DIAGNOSIS — Z98.890 OTHER SPECIFIED POSTPROCEDURAL STATES: Chronic | ICD-10-CM

## 2024-05-28 DIAGNOSIS — C80.1 MALIGNANT (PRIMARY) NEOPLASM, UNSPECIFIED: ICD-10-CM

## 2024-05-28 DIAGNOSIS — Z90.710 ACQUIRED ABSENCE OF BOTH CERVIX AND UTERUS: Chronic | ICD-10-CM

## 2024-05-28 DIAGNOSIS — Z90.89 ACQUIRED ABSENCE OF OTHER ORGANS: Chronic | ICD-10-CM

## 2024-05-28 DIAGNOSIS — Z90.2 ACQUIRED ABSENCE OF LUNG [PART OF]: Chronic | ICD-10-CM

## 2024-05-28 PROCEDURE — 71250 CT THORAX DX C-: CPT

## 2024-05-28 PROCEDURE — 71250 CT THORAX DX C-: CPT | Mod: 26,MH

## 2024-06-03 ENCOUNTER — NON-APPOINTMENT (OUTPATIENT)
Age: 79
End: 2024-06-03

## 2024-06-05 ENCOUNTER — NON-APPOINTMENT (OUTPATIENT)
Age: 79
End: 2024-06-05

## 2024-06-05 ENCOUNTER — APPOINTMENT (OUTPATIENT)
Dept: THORACIC SURGERY | Facility: CLINIC | Age: 79
End: 2024-06-05
Payer: MEDICARE

## 2024-06-05 VITALS
DIASTOLIC BLOOD PRESSURE: 70 MMHG | HEIGHT: 60 IN | RESPIRATION RATE: 16 BRPM | OXYGEN SATURATION: 99 % | HEART RATE: 86 BPM | SYSTOLIC BLOOD PRESSURE: 117 MMHG | BODY MASS INDEX: 21.2 KG/M2 | WEIGHT: 108 LBS

## 2024-06-05 DIAGNOSIS — C80.1 MALIGNANT (PRIMARY) NEOPLASM, UNSPECIFIED: ICD-10-CM

## 2024-06-05 DIAGNOSIS — R91.1 SOLITARY PULMONARY NODULE: ICD-10-CM

## 2024-06-05 DIAGNOSIS — C34.90 MALIGNANT NEOPLASM OF UNSPECIFIED PART OF UNSPECIFIED BRONCHUS OR LUNG: ICD-10-CM

## 2024-06-05 PROCEDURE — 99214 OFFICE O/P EST MOD 30 MIN: CPT

## 2024-06-05 NOTE — ASSESSMENT
[FreeTextEntry1] : Ms. MARIFER BENTLEY, 79 year old female, former smoker (2 PPD x 3 years; Quit 1970), w/ hx of DM2, breast cancer s/p lumpectomy and radiation 2003, who was undergoing routine evaluation of her breast that revealed 0.9 cm RML LUNG LESION on MRI Breast 4/13/2021, with mildly avidity confirmed on PET/CT 4/16/21. The patient is otherwise in good health. In 2020, she was hospitalized at [facility] for chest pain and underwent a negative coronary angiogram.  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  She presents today for follow up with imaging.   I have independently reviewed the medical records and imaging at the time of this office consultation. Ct imaging reviewed and compared to prior. Stable findings. Recommend repeating a non contrast CT chest as well as CT abdo/Pelvis w/wo IV Contrast in 6 months to re-evaluate Stability. She is agreeable.   Recommendations reviewed with patient during this office visit, and all questions answered; Patient instructed on the importance of follow up and verbalizes understanding.   I, HILTON Dowd, personally performed the evaluation and management (E/M) services for this established patient. That E/M includes conducting the examination, assessing all new/exacerbated conditions, and establishing a new plan of care. Today, My ACP, Alicia De La Rosa, was here to observe my evaluation and management services for this patient to be followed going forward.

## 2024-06-05 NOTE — CONSULT LETTER
[FreeTextEntry2] : Dr. Jean Elizabeth (PULM/ref)\par  Dr. Jordin Lebron MD (ONC)\par  Dr. Tawny Cedillo (PCP) \par  \par   [FreeTextEntry3] : Vernon Wilkins MD, FACS \par  , Division of Thoracic Surgery \par  [facility] \par  Chief, Thoracic Surgery \par  [facility] \par  Department of Cardiovascular & Thoracic Surgery \par   \par  [employer] [employer] [facility]\par    Admission Reconciliation is Completed  Discharge Reconciliation is Not Complete Admission Reconciliation is Completed  Discharge Reconciliation is Completed

## 2024-06-05 NOTE — PHYSICAL EXAM
[] : no respiratory distress [Respiration, Rhythm And Depth] : normal respiratory rhythm and effort [Exaggerated Use Of Accessory Muscles For Inspiration] : no accessory muscle use [Auscultation Breath Sounds / Voice Sounds] : lungs were clear to auscultation bilaterally [Heart Rate And Rhythm] : heart rate was normal and rhythm regular [Examination Of The Chest] : the chest was normal in appearance [Chest Visual Inspection Thoracic Asymmetry] : no chest asymmetry [Diminished Respiratory Excursion] : normal chest expansion [Involuntary Movements] : no involuntary movements were seen [Skin Color & Pigmentation] : normal skin color and pigmentation [Oriented To Time, Place, And Person] : oriented to person, place, and time

## 2024-06-05 NOTE — HISTORY OF PRESENT ILLNESS
[FreeTextEntry1] : Ms. MARIFER BENTLEY, 79 year old female, former smoker (2 PPD x 3 years; Quit 1970), w/ hx of DM2, breast cancer s/p lumpectomy and radiation 2003, who was undergoing routine evaluation of her breast that revealed 0.9 cm RML LUNG LESION on MRI Breast 4/13/2021, with mildly avidity confirmed on PET/CT 4/16/21. The patient is otherwise in good health. In 2020, she was hospitalized at [facility] for chest pain and underwent a negative coronary angiogram.   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     CT Chest on 7/29/21:  - s/p RMLobectomy  - Mild nodular opacity along the chain sutures; Cluster of nodular opacities within the anterobasilar RLL. (series 2, image 69). -  Slight increase in subpleural opacity within the superior segment of the RLL (series 2, image 24-29) - Unchanged 5 mm basilar RLL nodule (series 2, image 82) - Unchanged 6 mm ROBERT nodule along the fissure (series 2, image 18)  Of note, she has several cysts of her liver which have been known for a number of years. October, 2021, follow up MRI Liver demonstrating indeterminate lesion seen within the left hepatic lobe on prior studies has slightly enlarged and must be reviewed with suspicion for neoplasia, particularly metastatic disease.   s/p Ultrasound guided liver biopsy of segment 4A on 10/26/21. Final Path: Neuroendocrine tumor   PET CT 10/11/2021: - No new FDG avid suspicious lung nodule  - Stable background FDG uptake within the enlarging indeterminate segment 4a liver lesion.  - No evidence of FDG avid yessenia or distant metastases  DOTATATE Scan on 12/2/2021:  - Markedly increased DOTATATE uptake at the gastric fundus, SUV 12, involving 3 cm segment. - Heterogeneous distribution of the DOTATATE uptake in the liver with multiple punctate foci, SUV ranges between 7-8. The largest hypdense lesion measuring 2 x 1.8 cm is located at segment 8 (Image 151) - Focally increased DOTATATE uptake at the T1 vertebral body without correlating lytic or sclerotic lesion. (Image 88; SUV 2.2)  - Last visit on 12/21: Discussed the review of the first pathology with the patient and her oncologist and explained that the tumor in the lung was neuroendocrine-atypical carcinoid on review.  Case reviewed with patient. 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 Plan for IV Somatostatin Analogue. Further liver and spine workup to be conducted by Dr. Benavides  CT on 6/6/22:  - Post op changes - Small diffusely calcified right lower lobe nodule is unchanged - Stable, noncalcified sub-5 mm RLL nodule image (83 series 2) and fissural nodule  (image 14) - Small hiatal hernia - 1.7 cm hypodense right hepatic lobe lesion stable since 7/29/2021. Subcentimeter hypodensities in the right and left hepatic lobes are stable in size. - Diffuse qualitative osteopenia.  - New, mild L1 vertebral body compression fracture with mild retropulsion of fracture fragment posteriorly into the spinal canal.  - Stable wedging deformity of the T5 vertebral body. There are surgical clips in the right axilla   MRI Liver w/wo contrast done on 6/7/22 (ordered by Dr. Benavides).  The report notes 2 hepatic metastases, a 2.0 cm mass hepatic segment 4 increased from 1.7 cm. and a 2.0 cm mass in hepatis segment 2/3 which is only faintly evident on prior study, increased from approximately 0.8 cm on the prior study. S/p liver ablation in 07/2022 at [facility].  She was treated with liver embolization in July 2022.   MRI Abdomen w/w/o contrast on 11/28/22 revealed: - stable 2 cm viable metastasis in segment 4A/8. - stable 0.5 cm metastasis in segment 3 which demonstrates no central enhancement - stable 0.5 indeterminate lesion in segment 7  CT Chest on 12/06/2022: - Status post right middle lobectomy as well as wedge resection in the right upper lobe. - Mucous/secretions are noted within few of the bronchi in the right lower lobe.  - Stable 0.5 cm solid nodule in the right lower lobe. - Calcified nodule is noted in the right lower lobe.   CT Chest on 06/01/2023: - Middle lobectomy and right upper lobe wedge resection.  - Unchanged right lower lobe 0.5 cm solid nodule (2-83). - Right lower lobe calcified granuloma. - Unchanged small cyst in the left hepatic lobe and other incompletely characterized hypoattenuating liver lesions. - Lateral right breast surgical clips. Unchanged lateral right breast asymmetric nodular density not well characterized with CT (2-43). Unchanged L1 compression fracture and mild loss of height of T5.  MRI Abd w/wo IV Contrast on 11/01/2023 ([facility]): - Stable 2.4 cm enhancing liver metastasis in segment 8/4A. - Interval growth of the 1.5 x 1 cm liver metastasis in the posterior aspect of segment 3, which was previously embolized. Lesion now demonstrates central enhancement consistent with viable tumor. - No new sites of abdominal metastatic disease.  11/14/2023: S/p chemo liver ablation.   CT Chest on 11/27/2023: - Lungs are remarkable for mild bilateral apical scarring.  - There is a calcified granuloma again seen anteriorly in the right lower lobe. - Again seen is a 5 mm nodule in the right lower lobe laterally adjacent to the diaphragm on image 93 of series 2. - Postsurgical changes are noted status post middle lobectomy and right upper lobe wedge resection. - Asymmetric density or tissue in the right posterior lateral breast is again seen best appreciated image 54 of series 2 similar to the prior exam. Evaluation is deferred to dedicated breast imaging.  - Visualized liver again demonstrate hypodense lesions with new associated somewhat linear areas of high density in the left lobe and a rounded area of high density at the level of the right lobe lesion. Please correlate for any intervention since the prior study  CT Chest on 05/28/2024: - Few mucoid impacted airways.  - Stable postoperative changes related to right middle lobectomy. - Previously described right basilar 0.4 cm nodule unchanged (2:95).  - Hepatic lesions without significant change compared to 11/27/2023; reference 2.3 cm hepatic lesion (image 99, series 2). The hyperdense foci within the liver have resolved since that time.  Patient presents today for 6 month follow up. Overall, feels well. Today, patient denies worsening SOB, chest pain, cough, hemoptysis, fever, chills, night sweats, lightheadedness or dizziness.

## 2024-06-24 ENCOUNTER — APPOINTMENT (OUTPATIENT)
Dept: ORTHOPEDIC SURGERY | Facility: CLINIC | Age: 79
End: 2024-06-24
Payer: MEDICARE

## 2024-06-24 DIAGNOSIS — S32.010A WEDGE COMPRESSION FRACTURE OF FIRST LUMBAR VERTEBRA, INITIAL ENCOUNTER FOR CLOSED FRACTURE: ICD-10-CM

## 2024-06-24 PROCEDURE — 99214 OFFICE O/P EST MOD 30 MIN: CPT

## 2024-08-12 ENCOUNTER — APPOINTMENT (OUTPATIENT)
Dept: ORTHOPEDIC SURGERY | Facility: CLINIC | Age: 79
End: 2024-08-12
Payer: MEDICARE

## 2024-08-12 VITALS
SYSTOLIC BLOOD PRESSURE: 147 MMHG | DIASTOLIC BLOOD PRESSURE: 75 MMHG | WEIGHT: 108 LBS | BODY MASS INDEX: 21.2 KG/M2 | HEIGHT: 60 IN

## 2024-08-12 DIAGNOSIS — S32.010A WEDGE COMPRESSION FRACTURE OF FIRST LUMBAR VERTEBRA, INITIAL ENCOUNTER FOR CLOSED FRACTURE: ICD-10-CM

## 2024-08-12 PROCEDURE — 99214 OFFICE O/P EST MOD 30 MIN: CPT

## 2024-08-12 NOTE — ADDENDUM
[FreeTextEntry1] :  I, Nakia Cruz, acted solely as a scribe for Dr. Gavin Gaitan MD on this date 08/12/2024   All medical record entries made by the Scribe were at my, Dr. Gavin Gaitan MD., direction and personally dictated by me on 08/12/2024. I have reviewed the chart and agree that the record accurately reflects my personal performance of the history, physical exam, assessment and plan. I have also personally directed, reviewed, and agreed with the chart.

## 2024-08-12 NOTE — PHYSICAL EXAM
[de-identified] : Lumbar Physical Exam  Gait - Normal  Station - Normal  Sagittal balance - Normal  Compensatory mechanism? - None  Reflexes Patellar - normal Gastroc - normal Clonus - No  Hip Exam - Pain n RT hip with applied pressure   Straight leg raise - none  Pulses - 2+ dp/pt  Range of motion - normal  Sensation Sensation intact to light touch in L1, L2, L3, L4, L5 and S1 dermatomes bilaterally  Motor  	IP	Quad	HS	TA	Gastroc	EHL Right	5/5	5/5	5/5	5/5	5/5	5/5 Left	5/5	5/5	5/5	5/5	5/5	5/5  [de-identified] : New lumbar MRI and thoracic MRI were reviewed T1 vertebral body lesion stable as compared to previous MRIs Compression deformities noted in the thoracic MRI.  These are stable No areas of critical central or foraminal stenosis

## 2024-08-12 NOTE — ASSESSMENT
[FreeTextEntry1] : I had a lengthy discussion with the patient in regard to treatment plan and diagnosis. There are no red flag findings on imaging nor are there any red flag findings on clinical exams. Therefore, we will proceed with a course of conservative treatment. This would include a home exercise program, Tylenol, NSAIDs as medically indicated. The patient will follow up with me in approximately 6 weeks. I encouraged the patient to follow-up sooner if there are any new or worsening symptoms.

## 2024-08-12 NOTE — HISTORY OF PRESENT ILLNESS
[de-identified] : Patient is a 79 year old female who presents for f/u eval of L1 compression Fx. Today, patient reports mild pain in RT lower back and hip/buttock. States sxs began a few days ago and she had difficulty going up stairs. Her symptoms have improved since.  Patient has Osteoporosis.   06.24.24 Patient is a 79 year old female who presents for f/u evaluation of L1 compression fracture. Patient reports persistent LT sided back pain. Able to walk 5 blocks.    05.13.24 78 yo female seen a year ago for an L1 compression fracture.  Today the patient states that she does have diffuse left sided back pain.  She denies any radicular pain.  She denies any new issues with balance or head dexterity.  She is here to go over MRI results.  She is able to do the majority of her actives of daily living although she does have back pain from time to time which can be significant.  5/10/2023 78 year old female who presents for follow-up evaluation of her lower back pain s/p [id number] fracture. Today, the patient reports she feels better relative to her previous appt. She reports having a gallbladder surgery that helped alleviate the radiating pain. However, she reports she is still experiencing pain that is exacerbated when she is sitting for long periods of time, but reports relief when applying heat.  Denies any bowel/bladder incontinence. Denies any saddle anesthesia.   02/17/2023 78 y.o female presents for her f/u appt for her lower back pain. Patient reports her pain has been improving since the last visit but reports that the pain flares up when she is sitting for a long period of time and radiates to the anterior aspect of her hips. Patient reports she has been going to physical therapy which has been helping her sxs.  She denies any issues with bowel or bladder dysfunction or saddle anesthesia.  12/9/22 This is a 77 year old female who is following up on her back pain. Patient is feeling better.She states the pain radiates to the middle of her back when she's standing for long periods of time but when she sits down it goes away .Patient did do physical therapy but states it makes her symptoms worse. We reviewed her MRI results. She denies any issues with bowel or bladder dysfunction or saddle anesthesia.  8/3/22 Today the patient states that overall her back pain is improved.  She denies any severe radicular type pain.  She denies any saddle anesthesia.  She denies any bowel bladder issues.  She is able to do more more of her activities of daily living.  She did recently get a liver procedure for her cancer diagnosis.  She recovered well from this.  05/09/22 Today the patient states that she is still dealing with some back pain.  She denies any severe radicular type pain.  She denies any bowel bladder issues.  She denies any saddle anesthesia.  Overall she is pleased with her improvement in symptoms.  She is however still dealing with some residual discomfort.  She is wearing her LSO brace for comfort.  04/20/22 This is a 77-year-old female that is here today for evaluation of his low back pain.  On April 8 the patient had a fall.  Since that time she has been dealing with back pain.  She had 2 trips to the emergency room.  Eventually she had an MRI which did show an L1 compression fracture.  She denies any radiating type pain down her legs.  She does state that her pain has improved as compared to her first day of injury.  She denies any bowel bladder issues.  She denies any saddle anesthesia.  Of note the patient does have a complex history in relation to cancer as dictated in other portions of her medical chart.

## 2024-08-29 ENCOUNTER — APPOINTMENT (OUTPATIENT)
Dept: OBGYN | Facility: CLINIC | Age: 79
End: 2024-08-29
Payer: MEDICARE

## 2024-08-29 VITALS
HEIGHT: 60 IN | BODY MASS INDEX: 21.6 KG/M2 | SYSTOLIC BLOOD PRESSURE: 129 MMHG | DIASTOLIC BLOOD PRESSURE: 74 MMHG | WEIGHT: 110 LBS

## 2024-08-29 DIAGNOSIS — Z90.710 ACQUIRED ABSENCE OF BOTH CERVIX AND UTERUS: ICD-10-CM

## 2024-08-29 DIAGNOSIS — M81.0 AGE-RELATED OSTEOPOROSIS W/OUT CURRENT PATHOLOGICAL FRACTURE: ICD-10-CM

## 2024-08-29 DIAGNOSIS — N90.5 ATROPHY OF VULVA: ICD-10-CM

## 2024-08-29 DIAGNOSIS — C7A.090 MALIGNANT CARCINOID TUMOR OF THE BRONCHUS AND LUNG: ICD-10-CM

## 2024-08-29 DIAGNOSIS — Z85.3 PERSONAL HISTORY OF MALIGNANT NEOPLASM OF BREAST: ICD-10-CM

## 2024-08-29 DIAGNOSIS — Z01.411 ENCOUNTER FOR GYNECOLOGICAL EXAMINATION (GENERAL) (ROUTINE) WITH ABNORMAL FINDINGS: ICD-10-CM

## 2024-08-29 DIAGNOSIS — N36.2 URETHRAL CARUNCLE: ICD-10-CM

## 2024-08-29 DIAGNOSIS — C34.90 MALIGNANT NEOPLASM OF UNSPECIFIED PART OF UNSPECIFIED BRONCHUS OR LUNG: ICD-10-CM

## 2024-08-29 DIAGNOSIS — N60.91 UNSPECIFIED BENIGN MAMMARY DYSPLASIA OF RIGHT BREAST: ICD-10-CM

## 2024-08-29 DIAGNOSIS — Z90.722 ACQUIRED ABSENCE OF BOTH CERVIX AND UTERUS: ICD-10-CM

## 2024-08-29 DIAGNOSIS — Z90.79 ACQUIRED ABSENCE OF BOTH CERVIX AND UTERUS: ICD-10-CM

## 2024-08-29 PROCEDURE — G0101: CPT

## 2024-08-29 PROCEDURE — G0328 FECAL BLOOD SCRN IMMUNOASSAY: CPT | Mod: QW

## 2024-09-12 ENCOUNTER — NON-APPOINTMENT (OUTPATIENT)
Age: 79
End: 2024-09-12

## 2024-09-23 ENCOUNTER — APPOINTMENT (OUTPATIENT)
Dept: ORTHOPEDIC SURGERY | Facility: CLINIC | Age: 79
End: 2024-09-23
Payer: MEDICARE

## 2024-09-23 DIAGNOSIS — S32.010A WEDGE COMPRESSION FRACTURE OF FIRST LUMBAR VERTEBRA, INITIAL ENCOUNTER FOR CLOSED FRACTURE: ICD-10-CM

## 2024-09-23 PROCEDURE — 99214 OFFICE O/P EST MOD 30 MIN: CPT

## 2024-09-23 NOTE — HISTORY OF PRESENT ILLNESS
[de-identified] : Patient is a 79 year old female who presents for f/u evaluation of L1 compression Fx. She puts pain at a 2 out of 10. She says she has pain in her left hip/buttock with pain that goes down the thigh. It hurts her the most when she sits on her couch. She states she's working with a physical therapist who is doing a lot of stretches with her.   8.26.24 Patient is a 79 year old female who presents for f/u eval of L1 compression Fx. Today, patient reports mild pain in RT lower back and hip/buttock. States sxs began a few days ago and she had difficulty going up stairs. Her symptoms have improved since.  Patient has Osteoporosis.   06.24.24 Patient is a 79 year old female who presents for f/u evaluation of L1 compression fracture. Patient reports persistent LT sided back pain. Able to walk 5 blocks.    05.13.24 80 yo female seen a year ago for an L1 compression fracture.  Today the patient states that she does have diffuse left sided back pain.  She denies any radicular pain.  She denies any new issues with balance or head dexterity.  She is here to go over MRI results.  She is able to do the majority of her actives of daily living although she does have back pain from time to time which can be significant.  5/10/2023 78 year old female who presents for follow-up evaluation of her lower back pain s/p [id number] fracture. Today, the patient reports she feels better relative to her previous appt. She reports having a gallbladder surgery that helped alleviate the radiating pain. However, she reports she is still experiencing pain that is exacerbated when she is sitting for long periods of time, but reports relief when applying heat.  Denies any bowel/bladder incontinence. Denies any saddle anesthesia.   02/17/2023 78 y.o female presents for her f/u appt for her lower back pain. Patient reports her pain has been improving since the last visit but reports that the pain flares up when she is sitting for a long period of time and radiates to the anterior aspect of her hips. Patient reports she has been going to physical therapy which has been helping her sxs.  She denies any issues with bowel or bladder dysfunction or saddle anesthesia.  12/9/22 This is a 77 year old female who is following up on her back pain. Patient is feeling better.She states the pain radiates to the middle of her back when she's standing for long periods of time but when she sits down it goes away .Patient did do physical therapy but states it makes her symptoms worse. We reviewed her MRI results. She denies any issues with bowel or bladder dysfunction or saddle anesthesia.  8/3/22 Today the patient states that overall her back pain is improved.  She denies any severe radicular type pain.  She denies any saddle anesthesia.  She denies any bowel bladder issues.  She is able to do more more of her activities of daily living.  She did recently get a liver procedure for her cancer diagnosis.  She recovered well from this.  05/09/22 Today the patient states that she is still dealing with some back pain.  She denies any severe radicular type pain.  She denies any bowel bladder issues.  She denies any saddle anesthesia.  Overall she is pleased with her improvement in symptoms.  She is however still dealing with some residual discomfort.  She is wearing her LSO brace for comfort.  04/20/22 This is a 77-year-old female that is here today for evaluation of his low back pain.  On April 8 the patient had a fall.  Since that time she has been dealing with back pain.  She had 2 trips to the emergency room.  Eventually she had an MRI which did show an L1 compression fracture.  She denies any radiating type pain down her legs.  She does state that her pain has improved as compared to her first day of injury.  She denies any bowel bladder issues.  She denies any saddle anesthesia.  Of note the patient does have a complex history in relation to cancer as dictated in other portions of her medical chart.

## 2024-09-23 NOTE — ADDENDUM
[FreeTextEntry1] :  I, Norbert Reyes, acted solely as a scribe for Dr. Gavin Gaitan MD on this date 09/23/2024  All medical record entries made by the Scribe were at my, Dr. Gavin Gaitan MD., direction and personally dictated by me on 09/23/2024. I have reviewed the chart and agree that the record accurately reflects my personal performance of the history, physical exam, assessment and plan. I have also personally directed, reviewed, and agreed with the chart.

## 2024-09-23 NOTE — PHYSICAL EXAM
[de-identified] : Lumbar Physical Exam  Gait - Normal  Station - Normal  Sagittal balance - Normal  Compensatory mechanism? - None  Reflexes Patellar - normal Gastroc - normal Clonus - No  Hip Exam - Pain n RT hip with applied pressure   Straight leg raise - none  Pulses - 2+ dp/pt  Range of motion - normal  Sensation Sensation intact to light touch in L1, L2, L3, L4, L5 and S1 dermatomes bilaterally  Motor  	IP	Quad	HS	TA	Gastroc	EHL Right	5/5	5/5	5/5	5/5	5/5	5/5 Left	5/5	5/5	5/5	5/5	5/5	5/5  [de-identified] : New lumbar MRI and thoracic MRI were reviewed T1 vertebral body lesion stable as compared to previous MRIs Compression deformities noted in the thoracic MRI.  These are stable No areas of critical central or foraminal stenosis

## 2024-09-25 ENCOUNTER — APPOINTMENT (OUTPATIENT)
Dept: ORTHOPEDIC SURGERY | Facility: CLINIC | Age: 79
End: 2024-09-25

## 2024-10-07 ENCOUNTER — NON-APPOINTMENT (OUTPATIENT)
Age: 79
End: 2024-10-07

## 2024-10-14 ENCOUNTER — NON-APPOINTMENT (OUTPATIENT)
Age: 79
End: 2024-10-14

## 2024-10-14 ENCOUNTER — APPOINTMENT (OUTPATIENT)
Dept: SURGICAL ONCOLOGY | Facility: CLINIC | Age: 79
End: 2024-10-14
Payer: MEDICARE

## 2024-10-14 VITALS
OXYGEN SATURATION: 98 % | HEART RATE: 91 BPM | RESPIRATION RATE: 17 BRPM | DIASTOLIC BLOOD PRESSURE: 76 MMHG | WEIGHT: 112.31 LBS | SYSTOLIC BLOOD PRESSURE: 154 MMHG | BODY MASS INDEX: 22.05 KG/M2 | HEIGHT: 60 IN

## 2024-10-14 DIAGNOSIS — D3A.8 OTHER BENIGN NEUROENDOCRINE TUMORS: ICD-10-CM

## 2024-10-14 DIAGNOSIS — Z85.3 ENCOUNTER FOR FOLLOW-UP EXAMINATION AFTER COMPLETED TREATMENT FOR MALIGNANT NEOPLASM: ICD-10-CM

## 2024-10-14 DIAGNOSIS — Z08 ENCOUNTER FOR FOLLOW-UP EXAMINATION AFTER COMPLETED TREATMENT FOR MALIGNANT NEOPLASM: ICD-10-CM

## 2024-10-14 PROCEDURE — 99214 OFFICE O/P EST MOD 30 MIN: CPT

## 2024-12-03 ENCOUNTER — OUTPATIENT (OUTPATIENT)
Dept: OUTPATIENT SERVICES | Facility: HOSPITAL | Age: 79
LOS: 1 days | End: 2024-12-03
Payer: MEDICARE

## 2024-12-03 ENCOUNTER — APPOINTMENT (OUTPATIENT)
Dept: CT IMAGING | Facility: IMAGING CENTER | Age: 79
End: 2024-12-03
Payer: MEDICARE

## 2024-12-03 DIAGNOSIS — Z98.89 OTHER SPECIFIED POSTPROCEDURAL STATES: Chronic | ICD-10-CM

## 2024-12-03 DIAGNOSIS — Z98.890 OTHER SPECIFIED POSTPROCEDURAL STATES: Chronic | ICD-10-CM

## 2024-12-03 DIAGNOSIS — Z90.49 ACQUIRED ABSENCE OF OTHER SPECIFIED PARTS OF DIGESTIVE TRACT: Chronic | ICD-10-CM

## 2024-12-03 DIAGNOSIS — Z90.89 ACQUIRED ABSENCE OF OTHER ORGANS: Chronic | ICD-10-CM

## 2024-12-03 DIAGNOSIS — Z90.710 ACQUIRED ABSENCE OF BOTH CERVIX AND UTERUS: Chronic | ICD-10-CM

## 2024-12-03 DIAGNOSIS — Z90.2 ACQUIRED ABSENCE OF LUNG [PART OF]: Chronic | ICD-10-CM

## 2024-12-03 PROCEDURE — 71250 CT THORAX DX C-: CPT | Mod: 26,MH

## 2024-12-03 PROCEDURE — 71250 CT THORAX DX C-: CPT

## 2024-12-11 ENCOUNTER — NON-APPOINTMENT (OUTPATIENT)
Age: 79
End: 2024-12-11

## 2024-12-11 ENCOUNTER — APPOINTMENT (OUTPATIENT)
Dept: THORACIC SURGERY | Facility: CLINIC | Age: 79
End: 2024-12-11
Payer: MEDICARE

## 2024-12-11 VITALS
OXYGEN SATURATION: 100 % | WEIGHT: 115 LBS | RESPIRATION RATE: 16 BRPM | HEIGHT: 60 IN | DIASTOLIC BLOOD PRESSURE: 53 MMHG | BODY MASS INDEX: 22.58 KG/M2 | SYSTOLIC BLOOD PRESSURE: 126 MMHG | HEART RATE: 78 BPM

## 2024-12-11 DIAGNOSIS — C80.1 MALIGNANT (PRIMARY) NEOPLASM, UNSPECIFIED: ICD-10-CM

## 2024-12-11 DIAGNOSIS — C34.90 MALIGNANT NEOPLASM OF UNSPECIFIED PART OF UNSPECIFIED BRONCHUS OR LUNG: ICD-10-CM

## 2024-12-11 DIAGNOSIS — R91.1 SOLITARY PULMONARY NODULE: ICD-10-CM

## 2024-12-11 PROCEDURE — 99443: CPT | Mod: 93

## 2024-12-12 ENCOUNTER — NON-APPOINTMENT (OUTPATIENT)
Age: 79
End: 2024-12-12

## 2024-12-26 ENCOUNTER — APPOINTMENT (OUTPATIENT)
Dept: PULMONOLOGY | Facility: CLINIC | Age: 79
End: 2024-12-26
Payer: MEDICARE

## 2024-12-26 VITALS
BODY MASS INDEX: 22.58 KG/M2 | HEART RATE: 82 BPM | SYSTOLIC BLOOD PRESSURE: 125 MMHG | DIASTOLIC BLOOD PRESSURE: 73 MMHG | OXYGEN SATURATION: 98 % | WEIGHT: 115 LBS | HEIGHT: 60 IN

## 2024-12-26 DIAGNOSIS — C7A.090 MALIGNANT CARCINOID TUMOR OF THE BRONCHUS AND LUNG: ICD-10-CM

## 2024-12-26 PROCEDURE — 99214 OFFICE O/P EST MOD 30 MIN: CPT

## 2025-04-23 ENCOUNTER — NON-APPOINTMENT (OUTPATIENT)
Age: 80
End: 2025-04-23

## 2025-04-26 ENCOUNTER — NON-APPOINTMENT (OUTPATIENT)
Age: 80
End: 2025-04-26

## 2025-04-28 ENCOUNTER — APPOINTMENT (OUTPATIENT)
Dept: SURGICAL ONCOLOGY | Facility: CLINIC | Age: 80
End: 2025-04-28
Payer: MEDICARE

## 2025-04-28 VITALS
RESPIRATION RATE: 17 BRPM | SYSTOLIC BLOOD PRESSURE: 121 MMHG | BODY MASS INDEX: 22.58 KG/M2 | WEIGHT: 115 LBS | OXYGEN SATURATION: 97 % | HEIGHT: 60 IN | DIASTOLIC BLOOD PRESSURE: 70 MMHG | HEART RATE: 86 BPM

## 2025-04-28 DIAGNOSIS — N60.91 UNSPECIFIED BENIGN MAMMARY DYSPLASIA OF RIGHT BREAST: ICD-10-CM

## 2025-04-28 DIAGNOSIS — Z85.3 PERSONAL HISTORY OF MALIGNANT NEOPLASM OF BREAST: ICD-10-CM

## 2025-04-28 PROCEDURE — 99214 OFFICE O/P EST MOD 30 MIN: CPT

## 2025-06-02 ENCOUNTER — APPOINTMENT (OUTPATIENT)
Dept: CT IMAGING | Facility: IMAGING CENTER | Age: 80
End: 2025-06-02
Payer: MEDICARE

## 2025-06-02 ENCOUNTER — OUTPATIENT (OUTPATIENT)
Dept: OUTPATIENT SERVICES | Facility: HOSPITAL | Age: 80
LOS: 1 days | End: 2025-06-02
Payer: MEDICARE

## 2025-06-02 DIAGNOSIS — Z90.49 ACQUIRED ABSENCE OF OTHER SPECIFIED PARTS OF DIGESTIVE TRACT: Chronic | ICD-10-CM

## 2025-06-02 DIAGNOSIS — C7A.090 MALIGNANT CARCINOID TUMOR OF THE BRONCHUS AND LUNG: ICD-10-CM

## 2025-06-02 DIAGNOSIS — Z98.890 OTHER SPECIFIED POSTPROCEDURAL STATES: Chronic | ICD-10-CM

## 2025-06-02 DIAGNOSIS — Z98.89 OTHER SPECIFIED POSTPROCEDURAL STATES: Chronic | ICD-10-CM

## 2025-06-02 DIAGNOSIS — Z90.89 ACQUIRED ABSENCE OF OTHER ORGANS: Chronic | ICD-10-CM

## 2025-06-02 DIAGNOSIS — Z90.710 ACQUIRED ABSENCE OF BOTH CERVIX AND UTERUS: Chronic | ICD-10-CM

## 2025-06-02 DIAGNOSIS — Z90.2 ACQUIRED ABSENCE OF LUNG [PART OF]: Chronic | ICD-10-CM

## 2025-06-02 PROCEDURE — 71250 CT THORAX DX C-: CPT

## 2025-06-02 PROCEDURE — 71250 CT THORAX DX C-: CPT | Mod: 26

## 2025-06-11 ENCOUNTER — APPOINTMENT (OUTPATIENT)
Dept: THORACIC SURGERY | Facility: CLINIC | Age: 80
End: 2025-06-11

## 2025-06-11 ENCOUNTER — NON-APPOINTMENT (OUTPATIENT)
Age: 80
End: 2025-06-11

## 2025-06-11 VITALS
HEART RATE: 73 BPM | DIASTOLIC BLOOD PRESSURE: 73 MMHG | RESPIRATION RATE: 17 BRPM | SYSTOLIC BLOOD PRESSURE: 150 MMHG | BODY MASS INDEX: 22.58 KG/M2 | HEIGHT: 60 IN | OXYGEN SATURATION: 97 % | WEIGHT: 115 LBS

## 2025-06-11 PROCEDURE — 99214 OFFICE O/P EST MOD 30 MIN: CPT

## 2025-09-02 ENCOUNTER — APPOINTMENT (OUTPATIENT)
Dept: OBGYN | Facility: CLINIC | Age: 80
End: 2025-09-02

## 2025-09-02 VITALS
SYSTOLIC BLOOD PRESSURE: 126 MMHG | BODY MASS INDEX: 23.16 KG/M2 | HEIGHT: 60 IN | DIASTOLIC BLOOD PRESSURE: 75 MMHG | WEIGHT: 118 LBS

## 2025-09-02 DIAGNOSIS — N90.5 ATROPHY OF VULVA: ICD-10-CM

## 2025-09-02 DIAGNOSIS — M81.0 AGE-RELATED OSTEOPOROSIS W/OUT CURRENT PATHOLOGICAL FRACTURE: ICD-10-CM

## 2025-09-02 DIAGNOSIS — N60.91 UNSPECIFIED BENIGN MAMMARY DYSPLASIA OF RIGHT BREAST: ICD-10-CM

## 2025-09-02 DIAGNOSIS — Z90.710 ACQUIRED ABSENCE OF BOTH CERVIX AND UTERUS: ICD-10-CM

## 2025-09-02 DIAGNOSIS — L72.3 SEBACEOUS CYST: ICD-10-CM

## 2025-09-02 DIAGNOSIS — Z90.722 ACQUIRED ABSENCE OF BOTH CERVIX AND UTERUS: ICD-10-CM

## 2025-09-02 DIAGNOSIS — N36.2 URETHRAL CARUNCLE: ICD-10-CM

## 2025-09-02 DIAGNOSIS — Z01.411 ENCOUNTER FOR GYNECOLOGICAL EXAMINATION (GENERAL) (ROUTINE) WITH ABNORMAL FINDINGS: ICD-10-CM

## 2025-09-02 DIAGNOSIS — C7A.090 MALIGNANT CARCINOID TUMOR OF THE BRONCHUS AND LUNG: ICD-10-CM

## 2025-09-02 DIAGNOSIS — Z90.79 ACQUIRED ABSENCE OF BOTH CERVIX AND UTERUS: ICD-10-CM

## 2025-09-02 DIAGNOSIS — C34.90 MALIGNANT NEOPLASM OF UNSPECIFIED PART OF UNSPECIFIED BRONCHUS OR LUNG: ICD-10-CM

## 2025-09-02 PROCEDURE — G0101: CPT | Mod: GA

## 2025-09-02 PROCEDURE — G0328 FECAL BLOOD SCRN IMMUNOASSAY: CPT | Mod: GA,QW

## (undated) DEVICE — PACK ADVANCED LAPAROSCOPIC NS

## (undated) DEVICE — DRAPE C ARM UNIVERSAL

## (undated) DEVICE — D HELP - CLEARVIEW CLEARIFY SYSTEM

## (undated) DEVICE — LIGASURE BLUNT TIP 37CM

## (undated) DEVICE — TROCAR COVIDIEN VERSAPORT BLADELESS OPTICAL 5MM STANDARD

## (undated) DEVICE — SUT MONOCRYL 4-0 18" PS-2

## (undated) DEVICE — PREP CHLORAPREP HI-LITE ORANGE 26ML

## (undated) DEVICE — GLV 8.5 PROTEXIS (WHITE)

## (undated) DEVICE — APPLICATOR VISTASEAL LAP DUAL 35CM RIGID

## (undated) DEVICE — GLV 7 PROTEXIS (WHITE)

## (undated) DEVICE — TROCAR APPLIED MEDICAL KII BALLOON BLUNT TIP 12MM X 130MM

## (undated) DEVICE — GLV 8 PROTEXIS (WHITE)

## (undated) DEVICE — POSITIONER FOAM EGG CRATE ULNAR 2PCS (PINK)

## (undated) DEVICE — DRSG OPSITE 2.5 X 2"

## (undated) DEVICE — ELCTR CORD FOOTSWITCH 1PLR LAPSCP 10FT

## (undated) DEVICE — TUBING INSUFFLATION LAP FILTER 10FT

## (undated) DEVICE — TUBING TRUWAVE PRESSURE MALE/FEMALE 72"

## (undated) DEVICE — SOL IRR POUR H2O 250ML

## (undated) DEVICE — DRAPE IOBAN 23" X 23"

## (undated) DEVICE — SUT POLYSORB 0 36" GU-46

## (undated) DEVICE — TUBING TUR 2 PRONG

## (undated) DEVICE — DRSG STERISTRIPS 0.5 X 4"

## (undated) DEVICE — SOL IRR POUR NS 0.9% 500ML

## (undated) DEVICE — SHEARS COVIDIEN ENDO SHEAR 5MM X 31CM W UNIPOLAR CAUTERY

## (undated) DEVICE — SPECIMEN CONTAINER 100ML

## (undated) DEVICE — CATH IV SAFE INSYTE 14G X 1.75" (ORANGE)

## (undated) DEVICE — TUBING IRRIGATION DAVOL SYSTEM X STREAM

## (undated) DEVICE — VENODYNE/SCD SLEEVE CALF LARGE

## (undated) DEVICE — LIGASURE MARYLAND 37CM

## (undated) DEVICE — DISSECTOR ENDO PEANUT 5MM

## (undated) DEVICE — MEDICATION LABELS W MARKER

## (undated) DEVICE — INSUFFLATION NDL COVIDIEN STEP 14G FOR STEP/VERSASTEP

## (undated) DEVICE — ENDOCATCH 10MM SPECIMEN POUCH

## (undated) DEVICE — TROCAR COVIDIEN VERSASTEP PLUS 11MM STANDARD

## (undated) DEVICE — DRAPE TOWEL BLUE 17" X 24"

## (undated) DEVICE — TROCAR APPLIED MEDICAL KII BALLOON BLUNT TIP 12MM X 100MM

## (undated) DEVICE — GOWN TRIMAX LG

## (undated) DEVICE — DRAPE INSTRUMENT POUCH 6.75" X 11"

## (undated) DEVICE — BLADE SCALPEL SAFETYLOCK #10

## (undated) DEVICE — GLV 7.5 PROTEXIS (WHITE)

## (undated) DEVICE — GLV 6.5 PROTEXIS (WHITE)

## (undated) DEVICE — WARMING BLANKET UPPER ADULT